# Patient Record
Sex: FEMALE | Race: BLACK OR AFRICAN AMERICAN | Employment: FULL TIME | URBAN - METROPOLITAN AREA
[De-identification: names, ages, dates, MRNs, and addresses within clinical notes are randomized per-mention and may not be internally consistent; named-entity substitution may affect disease eponyms.]

---

## 2017-04-21 ENCOUNTER — OFFICE VISIT (OUTPATIENT)
Dept: NEUROLOGY | Age: 32
End: 2017-04-21

## 2017-04-21 VITALS
HEIGHT: 62 IN | DIASTOLIC BLOOD PRESSURE: 98 MMHG | SYSTOLIC BLOOD PRESSURE: 140 MMHG | RESPIRATION RATE: 16 BRPM | HEART RATE: 87 BPM | BODY MASS INDEX: 34.78 KG/M2 | TEMPERATURE: 98.8 F | WEIGHT: 189 LBS | OXYGEN SATURATION: 99 %

## 2017-04-21 DIAGNOSIS — R07.89 OTHER CHEST PAIN: ICD-10-CM

## 2017-04-21 DIAGNOSIS — G54.6 PHANTOM LIMB PAIN (HCC): ICD-10-CM

## 2017-04-21 DIAGNOSIS — G43.919 INTRACTABLE MIGRAINE, UNSPECIFIED MIGRAINE TYPE: Primary | ICD-10-CM

## 2017-04-21 DIAGNOSIS — G89.4 CHRONIC PAIN SYNDROME: ICD-10-CM

## 2017-04-21 DIAGNOSIS — F11.90 CHRONIC, CONTINUOUS USE OF OPIOIDS: ICD-10-CM

## 2017-04-21 DIAGNOSIS — F41.1 GAD (GENERALIZED ANXIETY DISORDER): ICD-10-CM

## 2017-04-21 DIAGNOSIS — G62.9 POLYNEUROPATHY: ICD-10-CM

## 2017-04-21 RX ORDER — CYCLOBENZAPRINE HCL 5 MG
5 TABLET ORAL
Qty: 90 TAB | Refills: 1 | Status: SHIPPED | OUTPATIENT
Start: 2017-04-21 | End: 2019-11-07 | Stop reason: ALTCHOICE

## 2017-04-21 RX ORDER — OXYCODONE AND ACETAMINOPHEN 10; 325 MG/1; MG/1
1 TABLET ORAL
Qty: 60 TAB | Refills: 0 | Status: SHIPPED | OUTPATIENT
Start: 2017-04-21 | End: 2018-06-04

## 2017-04-21 RX ORDER — OXYCODONE HYDROCHLORIDE 5 MG/1
10 CAPSULE ORAL
Qty: 60 CAP | Refills: 0 | Status: SHIPPED | OUTPATIENT
Start: 2017-04-21 | End: 2018-06-04

## 2017-04-21 RX ORDER — OXYCODONE HYDROCHLORIDE 10 MG/1
10 TABLET ORAL
Qty: 60 TAB | Refills: 0 | Status: SHIPPED | OUTPATIENT
Start: 2017-04-21 | End: 2018-06-04

## 2017-04-21 NOTE — MR AVS SNAPSHOT
Visit Information Date & Time Provider Department Dept. Phone Encounter #  
 4/21/2017  1:00 PM [facility] [phone] [id number] Follow-up Instructions Return in about 2 months (around 6/21/2017). Your Appointments 4/27/2017  8:40 AM  
[facility] Appt Note: follow up, patient told to bring in ins card/photo id [id number]/10/17 [id number] Upcoming Health Maintenance Date Due Pneumococcal 19-64 Medium Risk (1 of 1 - PPSV23) 3/18/2005 DTaP/Tdap/Td series (1 - Tdap) 7/9/2010 INFLUENZA AGE 9 TO ADULT 8/1/2016 PAP AKA CERVICAL CYTOLOGY 9/10/2017 Allergies as of 4/21/2017  Review Complete On: 4/21/2017 By: Arnold Vásquez MD  
  
 Severity Noted Reaction Type Reactions Codeine  12/09/2015    Other (comments) Hallucinations, raise blood pressure Lortab [Hydrocodone-acetaminophen]  10/27/2011    Hives, Itching Tramadol  09/10/2014   Side Effect Nausea Only Headache Current Immunizations  Reviewed on 1/8/2015 Name Date  
 TB Skin Test (PPD) Intradermal 3/24/2014 Td 7/8/2010 Not reviewed this visit You Were Diagnosed With   
  
 Codes Comments Intractable migraine, unspecified migraine type    -  Primary ICD-10-CM: M21.693 ICD-9-CM: 346.91 Polyneuropathy     ICD-10-CM: G62.9 ICD-9-CM: 356.9 Chronic pain syndrome     ICD-10-CM: G89.4 ICD-9-CM: 338.4 STEVEN (generalized anxiety disorder)     ICD-10-CM: F41.1 ICD-9-CM: 300.02 Other chest pain     ICD-10-CM: R07.89 ICD-9-CM: 786.59 Phantom limb pain ([address]     ICD-10-CM: G54.6 ICD-9-CM: 353.6 Vitals BP Pulse Temp Resp Height(growth percentile) Weight(growth percentile) (!) 140/98 (BP 1 Location: Left arm, BP Patient Position: Sitting) 87 98.8 °F (37.1 °C) (Oral) 16 5' 2\" (1.575 m) 189 lb (85.7 kg) LMP SpO2 BMI OB Status Smoking Status 03/17/2017 (Exact Date) 99% 34.57 kg/m2 Having regular periods Current Every Day Smoker BMI and BSA Data Body Mass Index Body Surface Area 34.57 kg/m 2 1.94 m 2 Preferred Pharmacy Pharmacy Name Phone YOGI [email or web address] - [place], [address] [phone] Your Updated Medication List  
  
   
This list is accurate as of: 4/21/17  2:19 PM.  Always use your most recent med list.  
  
  
  
  
 albuterol 90 mcg/actuation inhaler Commonly known as:  PROVENTIL HFA, VENTOLIN HFA, PROAIR HFA Take 1 puff by inhalation every four (4) hours as needed for Wheezing. CLARITIN 10 mg tablet Generic drug:  loratadine Take 10 mg by mouth. * FLEXERIL PO Take  by mouth as needed. * cyclobenzaprine 5 mg tablet Commonly known as:  FLEXERIL Take 1 Tab by mouth three (3) times daily (with meals). hydroCHLOROthiazide 12.5 mg tablet Commonly known as:  HYDRODIURIL Take 12.5 mg by mouth daily. IMITREX 100 mg tablet Generic drug:  SUMAtriptan Take  by mouth once as needed for Migraine. lisinopril 20 mg tablet Commonly known as:  Rexanne  Take 1 Tab by mouth daily. METOPROLOL SUCCINATE PO Take  by mouth daily. montelukast 10 mg tablet Commonly known as:  SINGULAIR  
  
 oxyCODONE 5 mg capsule Commonly known as:  OXYIR Take 2 Caps by mouth every six (6) hours as needed for up to 6 doses. traZODone 100 mg tablet Commonly known as:  DESYREL  
100 mg nightly. TRIAMTERENE-HYDROCHLOROTHIAZID PO Take  by mouth daily. * Notice: This list has 2 medication(s) that are the same as other medications prescribed for you.  Read the directions carefully, and ask your doctor or other care provider to review them with you. Prescriptions Printed Refills  
 oxyCODONE (OXYIR) 5 mg capsule 0 Sig: Take 2 Caps by mouth every six (6) hours as needed for up to 6 doses. Class: Print Route: Oral  
  
Prescriptions Sent to Pharmacy Refills  
 cyclobenzaprine (FLEXERIL) 5 mg tablet 1 Sig: Take 1 Tab by mouth three (3) times daily (with meals). Class: Normal  
 Pharmacy: [email or web address], [address] #: [phone] Route: Oral  
  
Follow-up Instructions Return in about 2 months (around 6/21/2017). Introducing [facility]! [facility] introduces [employer] patient portal. Now you can access parts of your medical record, email your doctor's office, and request medication refills online. 1. In your internet browser, go to [email or web address] 2. Click on the First Time User? Click Here link in the Sign In box. You will see the New Member Sign Up page. 3. Enter your InDMusic Access Code exactly as it appears below. You will not need to use this code after youve completed the sign-up process. If you do not sign up before the expiration date, you must request a new code. · InDM[employer] Access Code: [email or web address] Expires: 7/20/2017  1:37 PM 
 
4. Enter the last four digits of your Social Security Number (xxxx) and Date of Birth (mm/dd/yyyy) as indicated and click Submit. You will be taken to the next sign-up page. 5. Create a AppMesht ID. This will be your In[employer]ic login ID and cannot be changed, so think of one that is secure and easy to remember. 6. Create a In[employer]ic password. You can change your password at any time. 7. Enter your Password Reset Question and Answer. This can be used at a later time if you forget your password. 8. Enter your e-mail address. You will receive e-mail notification when new information is available in [address] 9. Click Sign Up. You can now view and download portions of your medical record. 10. Click the Download Summary menu link to download a portable copy of your medical information. If you have questions, please visit the Frequently Asked Questions section of the [employer]IDEEN website. Remember, [employer]IDEEN is NOT to be used for urgent needs. For medical emergencies, dial 911. Now available from your iPhone and Android! Please provide this summary of care documentation to your next provider. Your primary care clinician is listed as NONE. If you have any questions after today's visit, please call [phone].

## 2017-04-21 NOTE — PROGRESS NOTES
Chief Complaint   Patient presents with    Migraine    Medication Refill     Pain contract signed and urine sample obtained

## 2017-04-21 NOTE — PROGRESS NOTES
Neurology Progress Note    NAME:  Shahzad Mercedes   :   1985   MRN:   [id number]     Date/Time:  2017  Subjective:    zaki Mercedes is a 33 y.o. female here today for follow up. Says she is having chest pain on the left side. Headache has improved some. Occasional dizziness. Pain of the hands sharp in nature,nausea, no vomiting. She noted that chest pain has been intermittent. Review of Systems:  Per HPI - Otherwise 12 point ROS was negative     []Unable to obtain  ROS due to  []mental status change  []sedated   []intubated    Medications reviewed:  Current Outpatient Prescriptions   Medication Sig Dispense Refill    METOPROLOL SUCCINATE PO Take  by mouth daily.  TRIAMTERENE-HYDROCHLOROTHIAZID PO Take  by mouth daily.  CYCLOBENZAPRINE HCL (FLEXERIL PO) Take  by mouth as needed.  oxyCODONE (OXYIR) 5 mg capsule Take 2 Caps by mouth every six (6) hours as needed for up to 6 doses. 60 Cap 0    cyclobenzaprine (FLEXERIL) 5 mg tablet Take 1 Tab by mouth three (3) times daily (with meals). 90 Tab 1    oxyCODONE IR (ROXICODONE) 10 mg tab immediate release tablet Take 1 Tab by mouth every eight (8) hours as needed. Max Daily Amount: 30 mg. 60 Tab 0    oxyCODONE-acetaminophen (PERCOCET 10)  mg per tablet Take 1 Tab by mouth every eight (8) hours as needed for Pain. Max Daily Amount: 3 Tabs. 60 Tab 0    SUMAtriptan (IMITREX) 100 mg tablet Take  by mouth once as needed for Migraine.  loratadine (CLARITIN) 10 mg tablet Take 10 mg by mouth.  traZODone (DESYREL) 100 mg tablet 100 mg nightly.  albuterol (PROVENTIL HFA, VENTOLIN HFA) 90 mcg/actuation inhaler Take 1 puff by inhalation every four (4) hours as needed for Wheezing. 1 Inhaler 3    Hydrochlorothiazide (HYDRODIURIL) 12.5 mg tablet Take 12.5 mg by mouth daily.  montelukast (SINGULAIR) 10 mg tablet       lisinopril (PRINIVIL, ZESTRIL) 20 mg tablet Take 1 Tab by mouth daily.  (Patient taking differently: Take 30 mg by mouth daily.) 90 Tab 3        Objective:   Vitals:  Vitals:    04/21/17 1347   BP: (!) 140/98   Pulse: 87   Resp: 16   Temp: 98.8 °F (37.1 °C)   TempSrc: Oral   SpO2: 99%   Weight: 189 lb (85.7 kg)   Height: 5' 2\" (1.575 m)   PainSc:   8   PainLoc: Neck   LMP: 03/17/2017       PHYSICAL EXAM:  General:    Alert, cooperative, moderate painful distress, appears stated age. Head:   Normocephalic, without obvious abnormality, atraumatic. Eyes:   Conjunctivae/corneas clear. PERRLA  Nose:  Nares normal. No drainage or sinus tenderness. Throat:    Lips, mucosa, and tongue normal.  No Thrush  Neck:  Supple, symmetrical,  no adenopathy, thyroid: non tender    no carotid bruit and no JVD. Back:    Symmetric,  No CVA tenderness. Lungs:   Clear to auscultation bilaterally. No Wheezing or Rhonchi. No rales. Chest wall:  No tenderness or deformity. No Accessory muscle use. Heart:   Regular rate and rhythm,  no murmur, rub or gallop. Abdomen:   Soft, non-tender. Not distended. Bowel sounds normal. No masses  Extremities: Extremities normal, atraumatic, No cyanosis. No edema. No clubbing  Skin:     Texture, turgor normal. No rashes or lesions. Not Jaundiced  Lymph nodes: Cervical, supraclavicular normal.  Psych:  Good insight. Not depressed. Not anxious or agitated. NEUROLOGICAL EXAM:  Appearance: The patient is well developed, well nourished, provides a coherent history and is moderate painful distress. Mental Status: Oriented to time, place and person. Mood and affect appropriate. Cranial Nerves:   Intact visual fields. Fundi are benign. RAYNE, EOM's full, no nystagmus, no ptosis. Facial sensation is normal. Corneal reflexes are intact. Facial movement is symmetric. Hearing is normal bilaterally. Palate is midline with normal sternocleidomastoid and trapezius muscles are normal. Tongue is midline. Motor:  5/5 strength in upper and lower proximal and distal muscles. Normal bulk and tone.  No fasciculations. Reflexes:   Deep tendon reflexes 2+/4 and symmetrical.   Sensory:   Normal to touch, pinprick and vibration. Gait:  Normal gait. Tremor:   Mild tremor noted. Cerebellar:  No cerebellar signs present. Neurovascular:  Normal heart sounds and regular rhythm, peripheral pulses intact, and no carotid bruits. Lab Data Reviewed:    No visits with results within 3 Month(s) from this visit. Latest known visit with results is:    Admission on 12/09/2015, Discharged on 12/09/2015   Component Date Value Ref Range Status    WBC 12/09/2015 6.3  3.6 - 11.0 K/uL Final    RBC 12/09/2015 3.93  3.80 - 5.20 M/uL Final    HGB 12/09/2015 11.5  11.5 - 16.0 g/dL Final    HCT 12/09/2015 34.9* 35.0 - 47.0 % Final    MCV 12/09/2015 88.8  80.0 - 99.0 FL Final    MCH 12/09/2015 29.3  26.0 - 34.0 PG Final    MCHC 12/09/2015 33.0  30.0 - 36.5 g/dL Final    RDW 12/09/2015 15.1* 11.5 - 14.5 % Final    PLATELET 40/60/9159 418  150 - 400 K/uL Final    NEUTROPHILS 12/09/2015 51  32 - 75 % Final    LYMPHOCYTES 12/09/2015 43  12 - 49 % Final    MONOCYTES 12/09/2015 5  5 - 13 % Final    EOSINOPHILS 12/09/2015 1  0 - 7 % Final    BASOPHILS 12/09/2015 0  0 - 1 % Final    ABS. NEUTROPHILS 12/09/2015 3.2  1.8 - 8.0 K/UL Final    ABS. LYMPHOCYTES 12/09/2015 2.7  0.8 - 3.5 K/UL Final    ABS. MONOCYTES 12/09/2015 0.3  0.0 - 1.0 K/UL Final    ABS. EOSINOPHILS 12/09/2015 0.1  0.0 - 0.4 K/UL Final    ABS.  BASOPHILS 12/09/2015 0.0  0.0 - 0.1 K/UL Final    Sodium 12/09/2015 139  136 - 145 mmol/L Final    Potassium 12/09/2015 3.9  3.5 - 5.1 mmol/L Final    Chloride 12/09/2015 105  97 - 108 mmol/L Final    CO2 12/09/2015 26  21 - 32 mmol/L Final    Anion gap 12/09/2015 8  5 - 15 mmol/L Final    Glucose 12/09/2015 82  65 - 100 mg/dL Final    BUN 12/09/2015 9  6 - 20 MG/DL Final    Creatinine 12/09/2015 0.95  0.55 - 1.02 MG/DL Final    BUN/Creatinine ratio 12/09/2015 9* 12 - 20   Final    GFR est AA 12/09/2015 >60  >60 ml/min/1.73m2 Final    GFR est non-AA 12/09/2015 >60  >60 ml/min/1.73m2 Final    Comment: Estimated GFR is calculated using the IDMS-traceable Modification of Diet in Renal Disease (MDRD) Study equation, reported for both  Americans (GFRAA) and non- Americans (GFRNA), and normalized to 1.73m2 body surface area. The physician must decide which value applies to the patient. The MDRD study equation should only be used in individuals age 25 or older. It has not been validated for the following: pregnant women, patients with serious comorbid conditions, or on certain medications, or persons with extremes of body size, muscle mass, or nutritional status.  Calcium 12/09/2015 8.7  8.5 - 10.1 MG/DL Final    Bilirubin, total 12/09/2015 0.2  0.2 - 1.0 MG/DL Final    ALT (SGPT) 12/09/2015 18  12 - 78 U/L Final    AST (SGOT) 12/09/2015 9* 15 - 37 U/L Final    Alk.  phosphatase 12/09/2015 65  45 - 117 U/L Final    Protein, total 12/09/2015 7.4  6.4 - 8.2 g/dL Final    Albumin 12/09/2015 3.9  3.5 - 5.0 g/dL Final    Globulin 12/09/2015 3.5  2.0 - 4.0 g/dL Final    A-G Ratio 12/09/2015 1.1  1.1 - 2.2   Final    Color 12/09/2015 YELLOW/STRAW    Final    Color Reference Range: Straw, Yellow or Dark Yellow    Appearance 12/09/2015 CLOUDY* CLEAR   Final    Specific gravity 12/09/2015 1.025  1.003 - 1.030   Final    pH (UA) 12/09/2015 6.5  5.0 - 8.0   Final    Protein 12/09/2015 NEGATIVE   NEG mg/dL Final    Glucose 12/09/2015 NEGATIVE   NEG mg/dL Final    Ketone 12/09/2015 NEGATIVE   NEG mg/dL Final    Bilirubin 12/09/2015 NEGATIVE   NEG   Final    Blood 12/09/2015 TRACE* NEG   Final    Urobilinogen 12/09/2015 1.0  0.2 - 1.0 EU/dL Final    Nitrites 12/09/2015 NEGATIVE   NEG   Final    Leukocyte Esterase 12/09/2015 NEGATIVE   NEG   Final    WBC 12/09/2015 0-4  0 - 4 /hpf Final    RBC 12/09/2015 0-5  0 - 5 /hpf Final    Epithelial cells 12/09/2015 FEW  FEW /lpf Final Epithelial cell category consists of squamous cells and /or transitional urothelial cells. Renal tubular cells, if present, are separately identified as such.  Bacteria 12/09/2015 NEGATIVE   NEG /hpf Final    UA:UC IF INDICATED 12/09/2015 CULTURE NOT INDICATED BY UA RESULT  CNI   Final    D-dimer 12/09/2015 0.25  0.00 - 0.65 mg/L FEU Final    Comment: (NOTE)  The combination of a low pre-test probability based on Wells criteria  and a D-Dimer result below the cutoff value of 0.5 mg/L increases the   negative predictive value for DVT to %.  Pregnancy test,urine (POC) 12/09/2015 NEGATIVE   NEG   Final       CT Results (recent): MRI Results (recent):    Results from [place] encounter on 10/26/16   MRI BRAIN W WO CONT   Narrative CLINICAL HISTORY: Possible demyelinating disease,    INDICATION: Demyelinating disease ([address] COMPARISON: None    TECHNIQUE: MR examination of the brain includes axial and sagittal T1  pre-and-post contrast, axial T2, axial FLAIR, axial gradient echo, axial DWI,  coronal T1 postcontrast.    Contrast: The patient was administered gadolinium-based contrast material axial  and coronal T1-weighted postcontrast enhanced imaging was obtained. FINDINGS:     There is no intracranial mass, hemorrhage or evidence of acute infarction. There is no Chiari or syrinx. The pituitary and infundibulum are grossly  unremarkable. There is no skull base mass. Cerebellopontine angles are grossly  unremarkable. The major intracranial vascular flow-voids are unremarkable. No  evidence of demyelinating process. There is no abnormal parenchymal enhancement. There is no abnormal meningeal  enhancement. The cavernous sinuses are symmetric. Optic chiasm and infundibulum  grossly unremarkable. Orbits are grossly symmetric. Dural venous sinuses are  grossly patent. The brain architecture is normal. There is no evidence of midline shift or  mass-effect.  The ventricles are normal in size, position and configuration. No  focal areas of abnormal signal intensity are seen. There are no extra-axial  fluid collections. The mastoid air cells and paranasal sinuses are well pneumatized and clear. Impression IMPRESSION:     Normal MRI of the brain. There is no intracranial mass, hemorrhage, acute infarction or evidence of  demyelinating process. IR Results (recent):  No results found for this or any previous visit. VAS/US Results (recent):  No results found for this or any previous visit. Assesment  Patient Active Problem List   Diagnosis Code    HTN (hypertension) I10    Anxiety and depression F41.9, F32.9    Dyslipidemia E78.5    Asthma J45.909      ___________________________________________________  PLAN:Patient asked to see PCP or go ER for chest pain      ICD-10-CM ICD-9-CM    1. Intractable migraine, unspecified migraine type G43.919 346.91 oxyCODONE (OXYIR) 5 mg capsule      cyclobenzaprine (FLEXERIL) 5 mg tablet   2. Polyneuropathy G62.9 356.9    3. Chronic pain syndrome G89.4 338.4    4. STEVEN (generalized anxiety disorder) F41.1 300.02    5. Other chest pain R07.89 786.59    6. Phantom limb pain (HCC) G54.6 353.6    7. Chronic, continuous use of opioids F11.90 305.51 COMPLIANCE DRUG SCREEN/PRESCRIPTION MONITORING     Follow-up Disposition:  Return in about 2 months (around 6/21/2017).            ___________________________________________________    Total time spent with patient:  []15   []25   []35   [] __ minutes    Care Plan discussed with:    []Patient   []Family    []Care Manager   []Consultant/Specialist :    ___________________________________________________    Attending Physician: Lilliana Lewis MD

## 2017-04-28 LAB — DRUGS UR: NORMAL

## 2017-06-21 ENCOUNTER — TELEPHONE (OUTPATIENT)
Dept: NEUROLOGY | Age: 32
End: 2017-06-21

## 2017-06-21 NOTE — TELEPHONE ENCOUNTER
Patient called because she got a reminder call about an appointment for tomorrow. I told that was a mistake and it should have been canceled. Patient states she never received a letter in the mail about being discharged and she still does not know \"how\" it happened as far as the reason why.

## 2018-06-04 ENCOUNTER — HOSPITAL ENCOUNTER (EMERGENCY)
Age: 33
Discharge: HOME | End: 2018-06-05
Attending: EMERGENCY MEDICINE | Admitting: EMERGENCY MEDICINE
Payer: MEDICAID

## 2018-06-04 VITALS
DIASTOLIC BLOOD PRESSURE: 97 MMHG | TEMPERATURE: 97.8 F | BODY MASS INDEX: 32.94 KG/M2 | WEIGHT: 179 LBS | SYSTOLIC BLOOD PRESSURE: 146 MMHG | HEART RATE: 84 BPM | OXYGEN SATURATION: 100 % | RESPIRATION RATE: 18 BRPM | HEIGHT: 62 IN

## 2018-06-04 DIAGNOSIS — N39.0 URINARY TRACT INFECTION WITHOUT HEMATURIA, SITE UNSPECIFIED: Primary | ICD-10-CM

## 2018-06-04 LAB
APPEARANCE UR: ABNORMAL
BACTERIA URNS QL MICRO: ABNORMAL /HPF
BILIRUB UR QL CFM: NEGATIVE
COLOR UR: ABNORMAL
EPITH CASTS URNS QL MICRO: ABNORMAL /LPF
GLUCOSE UR STRIP.AUTO-MCNC: NEGATIVE MG/DL
HGB UR QL STRIP: NEGATIVE
KETONES UR QL STRIP.AUTO: ABNORMAL MG/DL
LEUKOCYTE ESTERASE UR QL STRIP.AUTO: NEGATIVE
NITRITE UR QL STRIP.AUTO: NEGATIVE
PH UR STRIP: 6 [PH] (ref 5–8)
PROT UR STRIP-MCNC: NEGATIVE MG/DL
RBC #/AREA URNS HPF: ABNORMAL /HPF (ref 0–5)
SP GR UR REFRACTOMETRY: 1.02 (ref 1–1.03)
UA: UC IF INDICATED,UAUC: ABNORMAL
UROBILINOGEN UR QL STRIP.AUTO: 0.2 EU/DL (ref 0.2–1)
WBC URNS QL MICRO: ABNORMAL /HPF (ref 0–4)

## 2018-06-04 PROCEDURE — 87147 CULTURE TYPE IMMUNOLOGIC: CPT | Performed by: EMERGENCY MEDICINE

## 2018-06-04 PROCEDURE — 81001 URINALYSIS AUTO W/SCOPE: CPT | Performed by: EMERGENCY MEDICINE

## 2018-06-04 PROCEDURE — 99283 EMERGENCY DEPT VISIT LOW MDM: CPT

## 2018-06-04 PROCEDURE — 87086 URINE CULTURE/COLONY COUNT: CPT | Performed by: EMERGENCY MEDICINE

## 2018-06-05 LAB — HCG UR QL: NEGATIVE

## 2018-06-05 PROCEDURE — 81025 URINE PREGNANCY TEST: CPT

## 2018-06-05 RX ORDER — CEPHALEXIN 500 MG/1
500 CAPSULE ORAL 2 TIMES DAILY
Qty: 14 CAP | Refills: 0 | Status: SHIPPED | OUTPATIENT
Start: 2018-06-05 | End: 2018-06-12

## 2018-06-05 NOTE — ED PROVIDER NOTES
EMERGENCY DEPARTMENT HISTORY AND PHYSICAL EXAM      Date: 6/4/2018  Patient Name: Seema Dao    History of Presenting Illness     Chief Complaint   Patient presents with    Urinary Frequency     patient reports to ed with complaints of urinary frequency/pressure x 5 days       History Provided By: Patient    HPI: Seema Dao, 29 y.o. female with PMHx significant for HTN, arthritis, PTSD, bipolar, anxiety, depression, presents ambulatory to the ED with cc of intermittent urinary frequency and pressure x 5 days. She notes that she will begin to urinate and then it will \"stop for 20-30 seconds and start again. \" Pt also c/o mild abdominal cramping. She reports that her LMP was last week. Pt notes a hx of HTN for which she is prescribed metoprolol, but pt states that \"it doesn't work\". She reports taking \"1/2 of my cousin's clonidine\" today instead of her prescribed metoprolol. Pt denies any abnormal vaginal discharge. She denies any OTC medications or any other modifying factors for her sxs. Pt specifically denies any fevers, chills, SOB, HA, CP, N/V/D, dysuria, hematuria, or urgency. There are no other complaints, changes, or physical findings at this time. PCP: None     Social Hx: + EtOH; + Smoker (0.5 ppd); + Illicit Drugs (marijuana)      Current Outpatient Prescriptions   Medication Sig Dispense Refill    cephALEXin (KEFLEX) 500 mg capsule Take 1 Cap by mouth two (2) times a day for 7 days. 14 Cap 0    METOPROLOL SUCCINATE PO Take  by mouth daily.  TRIAMTERENE-HYDROCHLOROTHIAZID PO Take  by mouth daily.  CYCLOBENZAPRINE HCL (FLEXERIL PO) Take  by mouth as needed.  cyclobenzaprine (FLEXERIL) 5 mg tablet Take 1 Tab by mouth three (3) times daily (with meals). 90 Tab 1    SUMAtriptan (IMITREX) 100 mg tablet Take  by mouth once as needed for Migraine.  loratadine (CLARITIN) 10 mg tablet Take 10 mg by mouth.       Hydrochlorothiazide (HYDRODIURIL) 12.5 mg tablet Take 12.5 mg by mouth daily.      montelukast (SINGULAIR) 10 mg tablet       traZODone (DESYREL) 100 mg tablet 100 mg nightly.  albuterol (PROVENTIL HFA, VENTOLIN HFA) 90 mcg/actuation inhaler Take 1 puff by inhalation every four (4) hours as needed for Wheezing. 1 Inhaler 3    lisinopril (PRINIVIL, ZESTRIL) 20 mg tablet Take 1 Tab by mouth daily. (Patient taking differently: Take 30 mg by mouth daily.) 90 Tab 3       Past History     Past Medical History:  Past Medical History:   Diagnosis Date    Anxiety disorder     Arthritis     Asthma     Bipolar 1 disorder ([facility] [address]     Depression     Frequent headaches     Frequent headaches     Hypertension     Migraine     Muscle pain     Other ill-defined conditions(799.89)     dysplagia    Psychiatric disorder     bipolar, anxiety, depression, PTSD    PTSD (post-traumatic stress disorder)        Past Surgical History:  Past Surgical History:   Procedure Laterality Date    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    HX GYN  3-    birth    HX OTHER SURGICAL      Dysplasia       Family History:  Family History   Problem Relation Age of Onset   Mely Antoine Cancer Mother     Diabetes Father     Other Father      Aneursym   Mely Antoine Migraines Sister        Social History:  Social History   Substance Use Topics    Smoking status: Current Every Day Smoker     Packs/day: 0.50     Types: Cigarettes    Smokeless tobacco: Never Used    Alcohol use 0.0 oz/week      Comment: social       Allergies: Allergies   Allergen Reactions    Codeine Other (comments)     Hallucinations, raise blood pressure     Lortab [Hydrocodone-Acetaminophen] Hives and Itching    Tramadol Nausea Only     Headache         Review of Systems   Review of Systems   Constitutional: Negative for chills and fever. HENT: Negative for congestion, rhinorrhea, sneezing and sore throat. Eyes: Negative for redness and visual disturbance. Respiratory: Negative for shortness of breath.     Cardiovascular: Negative for leg swelling. Gastrointestinal: Negative for abdominal pain, nausea and vomiting. Genitourinary: Positive for difficulty urinating and frequency. Negative for dysuria, hematuria, urgency and vaginal discharge. Musculoskeletal: Negative for back pain, myalgias and neck stiffness. Skin: Negative for rash. Neurological: Negative for dizziness, syncope, weakness and headaches. Hematological: Negative for adenopathy. All other systems reviewed and are negative. Physical Exam   Physical Exam   Constitutional: She is oriented to person, place, and time. She appears well-developed and well-nourished. Smells of marijuana   HENT:   Head: Normocephalic and atraumatic. Mouth/Throat: Oropharynx is clear and moist.   Eyes: Conjunctivae and EOM are normal.   Neck: Normal range of motion and full passive range of motion without pain. Neck supple. Cardiovascular: Normal rate, regular rhythm, S1 normal, S2 normal, normal heart sounds, intact distal pulses and normal pulses. No murmur heard. Pulmonary/Chest: Effort normal and breath sounds normal. No respiratory distress. She has no wheezes. Abdominal: Soft. Normal appearance and bowel sounds are normal. She exhibits no distension. There is no tenderness. There is no rebound. Musculoskeletal: Normal range of motion. Neurological: She is alert and oriented to person, place, and time. She has normal strength. Skin: Skin is warm, dry and intact. No rash noted. Psychiatric: She has a normal mood and affect. Her speech is normal and behavior is normal. Judgment and thought content normal.   Nursing note and vitals reviewed.       Diagnostic Study Results     Labs -     Recent Results (from the past 12 hour(s))   URINALYSIS W/ REFLEX CULTURE    Collection Time: 06/04/18 11:31 PM   Result Value Ref Range    Color YELLOW/STRAW      Appearance TURBID (A) CLEAR      Specific gravity 1.025 1.003 - 1.030      pH (UA) 6.0 5.0 - 8.0      Protein NEGATIVE  NEG mg/dL    Glucose NEGATIVE  NEG mg/dL    Ketone TRACE (A) NEG mg/dL    Blood NEGATIVE  NEG      Urobilinogen 0.2 0.2 - 1.0 EU/dL    Nitrites NEGATIVE  NEG      Leukocyte Esterase NEGATIVE  NEG      WBC 0-4 0 - 4 /hpf    RBC 0-5 0 - 5 /hpf    Epithelial cells MODERATE (A) FEW /lpf    Bacteria 1+ (A) NEG /hpf    UA:UC IF INDICATED URINE CULTURE ORDERED (A) CNI     BILIRUBIN, CONFIRM    Collection Time: 06/04/18 11:31 PM   Result Value Ref Range    Bilirubin UA, confirm NEGATIVE  NEG     HCG URINE, QL. - POC    Collection Time: 06/05/18 12:21 AM   Result Value Ref Range    Pregnancy test,urine (POC) NEGATIVE  NEG       Medical Decision Making   I am the first provider for this patient. I reviewed the vital signs, available nursing notes, past medical history, past surgical history, family history and social history. Vital Signs-Reviewed the patient's vital signs. Patient Vitals for the past 12 hrs:   Temp Pulse Resp BP SpO2   06/04/18 2325 97.8 °F (36.6 °C) 84 18 (!) 146/97 100 %     Records Reviewed: Nursing Notes and Old Medical Records    Provider Notes (Medical Decision Making):   DDx: UTI, pregnancy, pyelonephritis     ED Course:   Initial assessment performed. The patients presenting problems have been discussed, and they are in agreement with the care plan formulated and outlined with them. I have encouraged them to ask questions as they arise throughout their visit. PROGRESS NOTE:   11:50 PM  Lauren Richardson PA-C educated pt on not taking medication that is not prescribed. Follow up with PCP for HTN. Critical Care Time: 0 minutes    Disposition:  Discharge Note:  12:02 AM  The pt is ready for discharge. The pt's signs, symptoms, diagnosis, and discharge instructions have been discussed and pt has conveyed their understanding. The pt is to follow up as recommended or return to ER should their symptoms worsen. Plan has been discussed and pt is in agreement. PLAN:  1.    Discharge Medication List as of 6/5/2018 12:31 AM      START taking these medications    Details   cephALEXin (KEFLEX) 500 mg capsule Take 1 Cap by mouth two (2) times a day for 7 days. , Print, Disp-14 Cap, R-0         CONTINUE these medications which have NOT CHANGED    Details   METOPROLOL SUCCINATE PO Take  by mouth daily. , Historical Med      TRIAMTERENE-HYDROCHLOROTHIAZID PO Take  by mouth daily. , Historical Med      !! CYCLOBENZAPRINE HCL (FLEXERIL PO) Take  by mouth as needed., Historical Med      !! cyclobenzaprine (FLEXERIL) 5 mg tablet Take 1 Tab by mouth three (3) times daily (with meals). , Normal, Disp-90 Tab, R-1      SUMAtriptan (IMITREX) 100 mg tablet Take  by mouth once as needed for Migraine., Historical Med      loratadine (CLARITIN) 10 mg tablet Take 10 mg by mouth., Historical Med      Hydrochlorothiazide (HYDRODIURIL) 12.5 mg tablet Take 12.5 mg by mouth daily. , Historical Med      montelukast (SINGULAIR) 10 mg tablet Historical Med      traZODone (DESYREL) 100 mg tablet 100 mg nightly., Historical Med      albuterol (PROVENTIL HFA, VENTOLIN HFA) 90 mcg/actuation inhaler Take 1 puff by inhalation every four (4) hours as needed for Wheezing., Normal, Disp-1 Inhaler, R-3      lisinopril (PRINIVIL, ZESTRIL) 20 mg tablet Take 1 Tab by mouth daily. , Normal, Disp-90 Tab, R-3       !! - Potential duplicate medications found. Please discuss with provider. 2.   Follow-up Information     Follow up With Details Comments [address] [facility],[address] [facility] Schedule an appointment as soon as possible for a visit in 1 week As needed, If symptoms worsen [address] [facility] [address] [address] [facility]  [phone]        Return to ED if worse     Diagnosis     Clinical Impression:   1. Urinary tract infection without hematuria, site unspecified        Attestations: This note is prepared by Thai Vila. Fuentes Martini, acting as Scribe for Lauren Richardson PA-C.     Lauren Richardson PA-C: The scribe's documentation has been prepared under my direction and personally reviewed by me in its entirety. I confirm that the note above accurately reflects all work, treatment, procedures, and medical decision making performed by me.

## 2018-06-05 NOTE — DISCHARGE INSTRUCTIONS

## 2018-06-06 LAB
BACTERIA SPEC CULT: ABNORMAL
CC UR VC: ABNORMAL
SERVICE CMNT-IMP: ABNORMAL

## 2018-07-17 ENCOUNTER — HOSPITAL ENCOUNTER (EMERGENCY)
Age: 33
Discharge: HOME | End: 2018-07-17
Attending: EMERGENCY MEDICINE
Payer: MEDICAID

## 2018-07-17 VITALS
WEIGHT: 175 LBS | RESPIRATION RATE: 16 BRPM | OXYGEN SATURATION: 97 % | SYSTOLIC BLOOD PRESSURE: 134 MMHG | DIASTOLIC BLOOD PRESSURE: 89 MMHG | BODY MASS INDEX: 32.2 KG/M2 | HEART RATE: 78 BPM | TEMPERATURE: 98.2 F | HEIGHT: 62 IN

## 2018-07-17 DIAGNOSIS — K52.9 GASTROENTERITIS, ACUTE: Primary | ICD-10-CM

## 2018-07-17 PROCEDURE — 74011250636 HC RX REV CODE- 250/636: Performed by: EMERGENCY MEDICINE

## 2018-07-17 PROCEDURE — 96372 THER/PROPH/DIAG INJ SC/IM: CPT

## 2018-07-17 PROCEDURE — 74011250637 HC RX REV CODE- 250/637: Performed by: EMERGENCY MEDICINE

## 2018-07-17 PROCEDURE — 99283 EMERGENCY DEPT VISIT LOW MDM: CPT

## 2018-07-17 RX ORDER — LOPERAMIDE HYDROCHLORIDE 2 MG/1
2 CAPSULE ORAL
Status: COMPLETED | OUTPATIENT
Start: 2018-07-17 | End: 2018-07-17

## 2018-07-17 RX ORDER — PROMETHAZINE HYDROCHLORIDE 25 MG/ML
25 INJECTION, SOLUTION INTRAMUSCULAR; INTRAVENOUS
Status: COMPLETED | OUTPATIENT
Start: 2018-07-17 | End: 2018-07-17

## 2018-07-17 RX ORDER — ONDANSETRON 4 MG/1
8 TABLET, ORALLY DISINTEGRATING ORAL
Status: COMPLETED | OUTPATIENT
Start: 2018-07-17 | End: 2018-07-17

## 2018-07-17 RX ORDER — LOPERAMIDE HCL 2 MG
2 TABLET ORAL
Qty: 16 TAB | Refills: 0 | Status: SHIPPED | OUTPATIENT
Start: 2018-07-17 | End: 2018-07-27

## 2018-07-17 RX ORDER — ONDANSETRON 4 MG/1
4 TABLET, ORALLY DISINTEGRATING ORAL
Qty: 10 TAB | Refills: 0 | Status: SHIPPED | OUTPATIENT
Start: 2018-07-17 | End: 2019-11-07 | Stop reason: ALTCHOICE

## 2018-07-17 RX ADMIN — PROMETHAZINE HYDROCHLORIDE 25 MG: 25 INJECTION INTRAMUSCULAR; INTRAVENOUS at 22:32

## 2018-07-17 RX ADMIN — ONDANSETRON 8 MG: 4 TABLET, ORALLY DISINTEGRATING ORAL at 22:27

## 2018-07-17 RX ADMIN — LOPERAMIDE HYDROCHLORIDE 2 MG: 2 CAPSULE ORAL at 22:27

## 2018-07-17 NOTE — LETTER
[facility] EMERGENCY DEPT 
[address] [address] [id number] [phone] Work/School Note Date: 7/17/2018 To Whom It May concern: 
 
Cleopatra Santoro was seen and treated today in the emergency room by the following provider(s): 
Attending Provider: Kelvin Plasencia MD.   
 
Cleopatra Santoro may return to work on 7/19/18. Sincerely, Kelvin Plasencia MD

## 2018-07-18 NOTE — ED NOTES
Pt arrived to ED via ambulatory with c/o vomiting since last night. Pt. States she has not vomited in 5 hours now. Pt. Now c/o headache. Pt is alert and orientated X 4; skin is intact; lungs are clear; pt breathes well on room air; Pt is in no acute distress. Will continue to monitor. See nursing assessment. Safety precautions in place; call light within reach. Emergency Department Nursing Plan of Care       The Nursing Plan of Care is developed from the Nursing assessment and Emergency Department Attending provider initial evaluation. The plan of care may be reviewed in the ED Provider note.     The Plan of Care was developed with the following considerations:   Patient / Family readiness to learn indicated by:verbalized understanding  Persons(s) to be included in education: patient  Barriers to Learning/Limitations:No    Signed     Tarun Alejo RN    7/17/2018   10:03 PM

## 2018-07-18 NOTE — DISCHARGE INSTRUCTIONS
[facility] SYSTEMS Departments     For adult and child immunizations, family planning, TB screening, STD testing and women's health services. [facility]: Tow [phone]      [facility] 25   [address]   [address]   170 [facility]: [place] [address]  [phone]      [address]          [address] [place] [address]     For primary care services, woman and child wellness, and some clinics providing specialty care. [facility] -- 1011 [facility]. [facility]5 [facility] [phone]   411 [facility] 200 [facility] 3614 [facility] [phone]   339 [facility] [address] [phone] [address]  [employer] [address] [place] [address] 5850  Community  [phone]   [address]00 [address] [place] [phone]   [facility] 81 [facility] [phone]   [facility] [address] [place] [address] [phone]   [facility]: [facility] 700 [address], [address] [facility] [phone]     [place] [address] [facility] [address] [phone]   [facility] 5850  Community  [phone]   [employer]  [address], [address] ([email or web address]) [phone]         Sexual Health/Woman Wellness Clinics    For STD/HIV testing and treatment, pregnancy testing and services, men's health, birth control services, LGBT services, and hepatitis/HPV vaccine services. [place] [address] Eug[place] for [place] [employer] 201 N. [facility] [address] [facility] [address] [facility] 1579 600 CHARISSE Gutierrez [phone]   [facility] [address], 5th floor [phone]   Pregnancy [address] [facility] [address] [phone]         Specialty Service [address]    [phone]   [place]   [phone]   Women, Infant and Children's Services: [address] [phone]       600 [facility]   [phone]   [address]   [facility]     [phone]   [address] Crisis   [address] [facility] [address] [phone]     Local Primary Care Physicians  [facility] [phone]  MD Manish Little MD Delta Boys, MD [facility] Doctors [phone]  Nuha Villagran, [facility]  MD Michelle Reich MD Carlette Lien, MD [address]  [phone]  Eloy [place]unch[place] [address] [facility] [phone]  MD Kateryna Perez MD Thedford Fetters, MD Estill Marks, MD   [facility] [phone]  [facility] XANDER DACOSTA, MD Benita [facility], NP [address] [phone]  MD Miller Correa MD Cara Meissner, MD Darlene Snowman, MD Alejandra Levering, MD Myles Salaam, MD Berkley Hippo, MD   33 10 [facility]  Nasreen Diane MD [address] [facility] [address] [phone]  MD Lea Penn, NP  MD Simon Sanchez MD Arneta Jacob, MD Graydon Living, MD Melvenia Shears, MD   8049 [facility] [phone]  MD Cynthia Trevizo, P  Amparo Burnette, NP  MD Jose Proctor MD Florencia Rafter, MD Joesph Brunswick, MD [facility] [phone]  MD Dulce Hamilton MD Leva Rand, MD Rodgers Medley, MD Piedad Dauer, MD   [address] [phone]  MD Natalie Ruff MD [facility] [phone]  MD Jessenia Clark MD Sharalyn Coast Shawanda Yi, [address] [facility] [phone]  MD Laine Bazan MD Raydell Gavel, MD Maree Para, MD Josefine Ray, MD Bill Kaiser, BHARATH Garcia MD 1619 [facility]   [phone]  MD Luis Chiang MD Rexford Serve, MD   2102 [facility] [phone]  [address] [place] [address] MD Charli Kang, VIJAY Emery, REJI Emery, VIJAY Khan, MD Brandon Escobar, BHARATH Lynn, DO Miscellaneous:  Blanca Mccray -[phone]            Diarrhea: Care Instructions  Your Care Instructions    Diarrhea is loose, watery stools (bowel movements). The exact cause is often hard to find. Sometimes diarrhea is your body's way of getting rid of what caused an upset stomach. Viruses, food poisoning, and many medicines can cause diarrhea. Some people get diarrhea in response to emotional stress, anxiety, or certain foods. Almost everyone has diarrhea now and then. It usually isn't serious, and your stools will return to normal soon. The important thing to do is replace the fluids you have lost, so you can prevent dehydration. The doctor has checked you carefully, but problems can develop later. If you notice any problems or new symptoms, get medical treatment right away. Follow-up care is a key part of your treatment and safety. Be sure to make and go to all appointments, and call your doctor if you are having problems. It's also a good idea to know your test results and keep a list of the medicines you take. How can you care for yourself at home? · Watch for signs of dehydration, which means your body has lost too much water. Dehydration is a serious condition and should be treated right away. Signs of dehydration are:  ¨ Increasing thirst and dry eyes and mouth. ¨ Feeling faint or lightheaded.   ¨ Darker urine, and a smaller amount of urine than normal.  · To prevent dehydration, drink plenty of fluids, enough so that your urine is light yellow or clear like water. Choose water and other caffeine-free clear liquids until you feel better. If you have kidney, heart, or liver disease and have to limit fluids, talk with your doctor before you increase the amount of fluids you drink. · Begin eating small amounts of mild foods the next day, if you feel like it. ¨ Try yogurt that has live cultures of Lactobacillus. (Check the label.)  ¨ Avoid spicy foods, fruits, alcohol, and caffeine until 48 hours after all symptoms are gone. ¨ Avoid chewing gum that contains sorbitol. ¨ Avoid dairy products (except for yogurt with Lactobacillus) while you have diarrhea and for 3 days after symptoms are gone. · The doctor may recommend that you take over-the-counter medicine, such as loperamide (Imodium), if you still have diarrhea after 6 hours. Read and follow all instructions on the label. Do not use this medicine if you have bloody diarrhea, a high fever, or other signs of serious illness. Call your doctor if you think you are having a problem with your medicine. When should you call for help? Call 911 anytime you think you may need emergency care. For example, call if:    · You passed out (lost consciousness).     · Your stools are maroon or very bloody.    Call your doctor now or seek immediate medical care if:    · You are dizzy or lightheaded, or you feel like you may faint.     · Your stools are black and look like tar, or they have streaks of blood.     · You have new or worse belly pain.     · You have symptoms of dehydration, such as:  ¨ Dry eyes and a dry mouth. ¨ Passing only a little dark urine.   ¨ Feeling thirstier than usual.     · You have a new or higher fever.    Watch closely for changes in your health, and be sure to contact your doctor if:    · Your diarrhea is getting worse.     · You see pus in the diarrhea.     · You are not getting better after 2 days (48 hours). Where can you learn more? Go to http://stephen-jose.info/. Enter L097 in the search box to learn more about \"Diarrhea: Care Instructions. \"  Current as of: November 20, 2017  Content Version: 11.7  © 9491-0679 [employer]. Care instructions adapted under license by [employer] (which disclaims liability or warranty for this information). If you have questions about a medical condition or this instruction, always ask your healthcare professional. [place] [address] any warranty or liability for your use of this information. Nausea and Vomiting: Care Instructions  Your Care Instructions    When you are nauseated, you may feel weak and sweaty and notice a lot of saliva in your mouth. Nausea often leads to vomiting. Most of the time you do not need to worry about nausea and vomiting, but they can be signs of other illnesses. Two common causes of nausea and vomiting are stomach flu and food poisoning. Nausea and vomiting from viral stomach flu will usually start to improve within 24 hours. Nausea and vomiting from food poisoning may last from 12 to 48 hours. The doctor has checked you carefully, but problems can develop later. If you notice any problems or new symptoms, get medical treatment right away. Follow-up care is a key part of your treatment and safety. Be sure to make and go to all appointments, and call your doctor if you are having problems. It's also a good idea to know your test results and keep a list of the medicines you take. How can you care for yourself at home? · To prevent dehydration, drink plenty of fluids, enough so that your urine is light yellow or clear like water. Choose water and other caffeine-free clear liquids until you feel better. If you have kidney, heart, or liver disease and have to limit fluids, talk with your doctor before you increase the amount of fluids you drink.   · Rest in bed until you feel better. · When you are able to eat, try clear soups, mild foods, and liquids until all symptoms are gone for 12 to 48 hours. Other good choices include dry toast, crackers, cooked cereal, and gelatin dessert, such as Jell-O. When should you call for help? Call 911 anytime you think you may need emergency care. For example, call if:    · You passed out (lost consciousness).    Call your doctor now or seek immediate medical care if:    · You have symptoms of dehydration, such as:  ¨ Dry eyes and a dry mouth. ¨ Passing only a little dark urine. ¨ Feeling thirstier than usual.     · You have new or worsening belly pain.     · You have a new or higher fever.     · You vomit blood or what looks like coffee grounds.    Watch closely for changes in your health, and be sure to contact your doctor if:    · You have ongoing nausea and vomiting.     · Your vomiting is getting worse.     · Your vomiting lasts longer than 2 days.     · You are not getting better as expected. Where can you learn more? Go to http://stephen-jose.info/. Enter 25 [id number] in the search box to learn more about \"Nausea and Vomiting: Care Instructions. \"  Current as of: November 20, 2017  Content Version: 11.7  © 6486-0380 [employer]. Care instructions adapted under license by [employer] (which disclaims liability or warranty for this information). If you have questions about a medical condition or this instruction, always ask your healthcare professional. [place] [address] any warranty or liability for your use of this information. Gastroenteritis: Care Instructions  Your Care Instructions    Gastroenteritis is an illness that may cause nausea, vomiting, and diarrhea. It is sometimes called \"stomach flu. \" It can be caused by bacteria or a virus. You will probably begin to feel better in 1 to 2 days.  In the meantime, get plenty of rest and make sure you do not become dehydrated. Dehydration occurs when your body loses too much fluid. Follow-up care is a key part of your treatment and safety. Be sure to make and go to all appointments, and call your doctor if you are having problems. It's also a good idea to know your test results and keep a list of the medicines you take. How can you care for yourself at home? · If your doctor prescribed antibiotics, take them as directed. Do not stop taking them just because you feel better. You need to take the full course of antibiotics. · Drink plenty of fluids to prevent dehydration, enough so that your urine is light yellow or clear like water. Choose water and other caffeine-free clear liquids until you feel better. If you have kidney, heart, or liver disease and have to limit fluids, talk with your doctor before you increase your fluid intake. · Drink fluids slowly, in frequent, small amounts, because drinking too much too fast can cause vomiting. · Begin eating mild foods, such as dry toast, yogurt, applesauce, bananas, and rice. Avoid spicy, hot, or high-fat foods, and do not drink alcohol or caffeine for a day or two. Do not drink milk or eat ice cream until you are feeling better. How to prevent gastroenteritis  · Keep hot foods hot and cold foods cold. · Do not eat meats, dressings, salads, or other foods that have been kept at room temperature for more than 2 hours. · Use a thermometer to check your refrigerator. It should be between 34°F and 40°F.  · Defrost meats in the refrigerator or microwave, not on the kitchen counter. · Keep your hands and your kitchen clean. Wash your hands, cutting boards, and countertops with hot soapy water frequently. · Cook meat until it is well done. · Do not eat raw eggs or uncooked sauces made with raw eggs. · Do not take chances. If food looks or tastes spoiled, throw it out. When should you call for help? Call 911 anytime you think you may need emergency care.  For example, call if:    · You vomit blood or what looks like coffee grounds.     · You passed out (lost consciousness).     · You pass maroon or very bloody stools.    Call your doctor now or seek immediate medical care if:    · You have severe belly pain.     · You have signs of needing more fluids. You have sunken eyes, a dry mouth, and pass only a little dark urine.     · You feel like you are going to faint.     · You have increased belly pain that does not go away in 1 to 2 days.     · You have new or increased nausea, or you are vomiting.     · You have a new or higher fever.     · Your stools are black and tarlike or have streaks of blood.    Watch closely for changes in your health, and be sure to contact your doctor if:    · You are dizzy or lightheaded.     · You urinate less than usual, or your urine is dark yellow or brown.     · You do not feel better with each day that goes by. Where can you learn more? Go to http://stephen-jose.info/. Enter N142 in the search box to learn more about \"Gastroenteritis: Care Instructions. \"  Current as of: November 18, 2017  Content Version: 11.7  © 6614-5194 [employer]. Care instructions adapted under license by [employer] (which disclaims liability or warranty for this information). If you have questions about a medical condition or this instruction, always ask your healthcare professional. [address] any warranty or liability for your use of this information.

## 2019-06-13 ENCOUNTER — HOSPITAL ENCOUNTER (EMERGENCY)
Age: 34
Discharge: HOME | End: 2019-06-14
Attending: EMERGENCY MEDICINE
Payer: COMMERCIAL

## 2019-06-13 VITALS
DIASTOLIC BLOOD PRESSURE: 83 MMHG | RESPIRATION RATE: 18 BRPM | BODY MASS INDEX: 33.49 KG/M2 | HEART RATE: 84 BPM | HEIGHT: 63 IN | SYSTOLIC BLOOD PRESSURE: 123 MMHG | OXYGEN SATURATION: 99 % | WEIGHT: 189 LBS | TEMPERATURE: 97.9 F

## 2019-06-13 DIAGNOSIS — M67.40 GANGLION CYST: Primary | ICD-10-CM

## 2019-06-13 PROCEDURE — 99283 EMERGENCY DEPT VISIT LOW MDM: CPT

## 2019-06-13 PROCEDURE — 74011250637 HC RX REV CODE- 250/637: Performed by: NURSE PRACTITIONER

## 2019-06-13 RX ORDER — IBUPROFEN 600 MG/1
600 TABLET ORAL
Status: COMPLETED | OUTPATIENT
Start: 2019-06-13 | End: 2019-06-13

## 2019-06-13 RX ORDER — IBUPROFEN 600 MG/1
600 TABLET ORAL
Qty: 20 TAB | Refills: 0 | Status: SHIPPED | OUTPATIENT
Start: 2019-06-13 | End: 2019-11-07 | Stop reason: ALTCHOICE

## 2019-06-13 RX ADMIN — IBUPROFEN 600 MG: 600 TABLET ORAL at 23:32

## 2019-06-13 NOTE — LETTER
[facility] EMERGENCY DEPT 
[address] [address] 
[phone] Work/School Note Date: 6/13/2019 To Whom It May concern: 
 
Edmar Miranda was seen and treated today in the emergency room by the following provider(s): 
Attending Provider: Kwabena Chapman MD 
Nurse Practitioner: Basil Acevedo NP. Edmar Miranda may return to work on 6/15/19. Sincerely, Donald Barragan NP

## 2019-06-14 NOTE — ED NOTES
Pt arrived to ED  with c/o cyst on right hand which began 1 day ago patient reports sharp pain 8/10 with movement. Pt took a ibuprofen 3 hours ago with no alleviation. Pt also applied ice last night with rodríguez alleviation. Pt has postive radial pulse with positive motor sensory in both habds. Pt is in no acute distress. Will continue to monitor. See nursing assessment. Safety precautions in place; call light within reach. Emergency Department Nursing Plan of Care       The Nursing Plan of Care is developed from the Nursing assessment and Emergency Department Attending provider initial evaluation. The plan of care may be reviewed in the ED Provider note.     The Plan of Care was developed with the following considerations:   Patient / Family readiness to learn indicated by:verbalized understanding  Persons(s) to be included in education: patient  Barriers to Learning/Limitations:No    Signed     Juany Mcclain RN    6/13/2019   10:56 PM

## 2019-06-14 NOTE — ED PROVIDER NOTES
EMERGENCY DEPARTMENT HISTORY AND PHYSICAL EXAM    Date: 6/13/2019  Patient Name: Evangelina Cotton    History of Presenting Illness     Chief Complaint   Patient presents with    Hand Pain         History Provided By: Patient    HPI: Evangelina Cotton is a 36 y.o. female with a PMH of Anxiety, arthritis, asthma, bipolar, depression, hypertension who presents with hand pain. Onset today. Symptoms occurred suddenly without injury. Located on right hand. Reports a bump present. No itching, redness, hand swelling. Reports moderate pain when touched. Denies history of pain/wrist surgeries or injury has not tried anything for symptoms. PCP: None    Current Outpatient Medications   Medication Sig Dispense Refill    ibuprofen (MOTRIN) 600 mg tablet Take 1 Tab by mouth every six (6) hours as needed for Pain. 20 Tab 0    METOPROLOL SUCCINATE PO Take  by mouth daily.  TRIAMTERENE-HYDROCHLOROTHIAZID PO Take  by mouth daily.  albuterol (PROVENTIL HFA, VENTOLIN HFA) 90 mcg/actuation inhaler Take 1 puff by inhalation every four (4) hours as needed for Wheezing. 1 Inhaler 3    ondansetron (ZOFRAN ODT) 4 mg disintegrating tablet Take 1 Tab by mouth every eight (8) hours as needed for Nausea. 10 Tab 0    CYCLOBENZAPRINE HCL (FLEXERIL PO) Take  by mouth as needed.  cyclobenzaprine (FLEXERIL) 5 mg tablet Take 1 Tab by mouth three (3) times daily (with meals). 90 Tab 1    SUMAtriptan (IMITREX) 100 mg tablet Take  by mouth once as needed for Migraine.  loratadine (CLARITIN) 10 mg tablet Take 10 mg by mouth.  Hydrochlorothiazide (HYDRODIURIL) 12.5 mg tablet Take 12.5 mg by mouth daily.  montelukast (SINGULAIR) 10 mg tablet       traZODone (DESYREL) 100 mg tablet 100 mg nightly.  lisinopril (PRINIVIL, ZESTRIL) 20 mg tablet Take 1 Tab by mouth daily.  (Patient taking differently: Take 30 mg by mouth daily.) 90 Tab 3       Past History     Past Medical History:  Past Medical History:   Diagnosis Date    Anxiety disorder     Arthritis     Asthma     Bipolar 1 disorder ([facility] [address]     Depression     Frequent headaches     Frequent headaches     Hypertension     Migraine     Muscle pain     Other ill-defined conditions(799.89)     dysplagia    Psychiatric disorder     bipolar, anxiety, depression, PTSD    PTSD (post-traumatic stress disorder)        Past Surgical History:  Past Surgical History:   Procedure Laterality Date    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    HX GYN  3-    birth    HX OTHER SURGICAL      Dysplasia       Family History:  Family History   Problem Relation Age of Onset    Cancer Mother     Diabetes Father     Other Father         Aneursym   Gee Shield Migraines Sister        Social History:  Social History     Tobacco Use    Smoking status: Current Every Day Smoker     Packs/day: 0.50     Types: Cigarettes    Smokeless tobacco: Never Used   Substance Use Topics    Alcohol use: Yes     Alcohol/week: 0.0 oz     Comment: social    Drug use: Yes     Types: Marijuana     Comment: smoked  puffs today       Allergies: Allergies   Allergen Reactions    Codeine Other (comments)     Hallucinations, raise blood pressure     Lortab [Hydrocodone-Acetaminophen] Hives and Itching    Tramadol Nausea Only     Headache         Review of Systems   Review of Systems   Musculoskeletal: Positive for arthralgias (Hand pain). Negative for joint swelling. Skin: Negative for color change and wound. Neurological: Negative for numbness. All other systems reviewed and are negative. Physical Exam     Vitals:    06/13/19 2217   BP: 123/83   Pulse: 84   Resp: 18   Temp: 97.9 °F (36.6 °C)   SpO2: 99%   Weight: 85.7 kg (189 lb)   Height: 5' 3\" (1.6 m)     Physical Exam   Constitutional: She is oriented to person, place, and time. She appears well-developed and well-nourished. No distress. HENT:   Head: Normocephalic and atraumatic.    Cardiovascular: Normal rate, regular rhythm and normal heart sounds. Pulmonary/Chest: Effort normal and breath sounds normal.   Musculoskeletal: Normal range of motion. She exhibits tenderness. She exhibits no edema. Full range of motion of right wrist and hand. Sensation intact. Radial 2+ pulses bilaterally. .   Neurological: She is alert and oriented to person, place, and time. She has normal reflexes. Skin: Skin is warm and dry. Pea-sized mobile soft mass to dorsal aspect of right hand, TTP. No surrounding erythema swelling   Psychiatric: She has a normal mood and affect. Nursing note and vitals reviewed. Diagnostic Study Results     Labs -   No results found for this or any previous visit (from the past 12 hour(s)). Radiologic Studies -   No orders to display     CT Results  (Last 48 hours)    None        CXR Results  (Last 48 hours)    None            Medical Decision Making   I am the first provider for this patient. I reviewed the vital signs, available nursing notes, past medical history, past surgical history, family history and social history. Vital Signs-Reviewed the patient's vital signs. Records Reviewed: Nursing Notes and Old Medical Records            Disposition:  Discharge    DISCHARGE NOTE:   11:43 PM      Care plan outlined and precautions discussed. Patient has no new complaints, changes, or physical findings. All medications were reviewed with the patient; will d/c home with ibuprofen. All of pt's questions and concerns were addressed. Patient was instructed and agrees to follow up with PCP, as well as to return to the ED upon further deterioration. Patient is ready to go home.     Follow-up Information     Follow up With Specialties Details Why [address]    [address] [place]  [phone]          Current Discharge Medication List      START taking these medications    Details   ibuprofen (MOTRIN) 600 mg tablet Take 1 Tab by mouth every six (6) hours as needed for Pain. Qty: 20 Tab, Refills: 0         CONTINUE these medications which have NOT CHANGED    Details   METOPROLOL SUCCINATE PO Take  by mouth daily. TRIAMTERENE-HYDROCHLOROTHIAZID PO Take  by mouth daily. albuterol (PROVENTIL HFA, VENTOLIN HFA) 90 mcg/actuation inhaler Take 1 puff by inhalation every four (4) hours as needed for Wheezing. Qty: 1 Inhaler, Refills: 3    Associated Diagnoses: Asthma      ondansetron (ZOFRAN ODT) 4 mg disintegrating tablet Take 1 Tab by mouth every eight (8) hours as needed for Nausea. Qty: 10 Tab, Refills: 0      !! CYCLOBENZAPRINE HCL (FLEXERIL PO) Take  by mouth as needed. !! cyclobenzaprine (FLEXERIL) 5 mg tablet Take 1 Tab by mouth three (3) times daily (with meals). Qty: 90 Tab, Refills: 1    Associated Diagnoses: Intractable migraine, unspecified migraine type      SUMAtriptan (IMITREX) 100 mg tablet Take  by mouth once as needed for Migraine. loratadine (CLARITIN) 10 mg tablet Take 10 mg by mouth. Hydrochlorothiazide (HYDRODIURIL) 12.5 mg tablet Take 12.5 mg by mouth daily. montelukast (SINGULAIR) 10 mg tablet       traZODone (DESYREL) 100 mg tablet 100 mg nightly. lisinopril (PRINIVIL, ZESTRIL) 20 mg tablet Take 1 Tab by mouth daily. Qty: 90 Tab, Refills: 3    Associated Diagnoses: HTN (hypertension)       ! ! - Potential duplicate medications found. Please discuss with provider. Provider Notes (Medical Decision Making):   Ganglion cyst, lipoma, epidermoid cyst, hand pain    Procedures:  Procedures        Diagnosis     Clinical Impression:   1.  Ganglion cyst

## 2019-06-14 NOTE — DISCHARGE INSTRUCTIONS
Patient Education        Ganglions: Care Instructions  Your Care Instructions    A ganglion is a small sac, or cyst, filled with a clear fluid that is like jelly. A ganglion may look like a bump on the hand or wrist. It also can appear on your feet, ankles, knees, or shoulders. It is not cancer. A ganglion can grow out of the protective area, or capsule, around a joint. It also can grow on a tendon sheath, which covers the ropelike tendons that connect muscle to bone. A ganglion may hurt or cause numbness if it presses on a nerve. Many ganglions do not need treatment, and they often go away on their own. But if a ganglion hurts, becomes larger, causes numbness, or limits your activity, your doctor may want to drain it with a needle and syringe or remove it with minor surgery. Follow-up care is a key part of your treatment and safety. Be sure to make and go to all appointments, and call your doctor if you are having problems. It's also a good idea to know your test results and keep a list of the medicines you take. How can you care for yourself at home? · Wear a wrist or finger splint as directed by your doctor. It will keep your wrist or hand from moving and help reduce the fluid in the cyst. This may be all you need for the ganglion to shrink and go away. · Do not smash a ganglion with a book or other heavy object. You may break a bone or otherwise injure your wrist by trying this folk remedy, and the ganglion may return anyway. · Do not try to drain the fluid by poking the ganglion with a pin or any other sharp object. You could cause an infection. When should you call for help? Call your doctor now or seek immediate medical care if:    · You have signs of infection, such as:  ? Increased pain, swelling, warmth, or redness. ? Red streaks leading from the cyst.  ? Pus draining from the cyst.  ?  A fever.    Watch closely for changes in your health, and be sure to contact your doctor if:    · You have increasing pain.     · Your ganglion is getting larger.     · You still have pain or numbness from a ganglion. Where can you learn more? Go to http://stephen-jose.info/. Enter U678 in the search box to learn more about \"Ganglions: Care Instructions. \"  Current as of: September 20, 2018  Content Version: 11.9  © 2879-6407 [employer]. Care instructions adapted under license by [employer] (which disclaims liability or warranty for this information). If you have questions about a medical condition or this instruction, always ask your healthcare professional. [place] [address] any warranty or liability for your use of this information.

## 2019-06-14 NOTE — ED NOTES
Waqas, FNP at bedside reviewing patient's discharge instructions and reviewing medications. Patient ambulatory home. Patient in no apparent distress.

## 2019-06-14 NOTE — ED TRIAGE NOTES
C/o left hand pain x yesterday. Pt denies injury. Pt noted to have raised, red area to posterior hand.

## 2019-11-07 ENCOUNTER — OFFICE VISIT (OUTPATIENT)
Dept: FAMILY MEDICINE CLINIC | Age: 34
End: 2019-11-07

## 2019-11-07 VITALS
BODY MASS INDEX: 33.22 KG/M2 | SYSTOLIC BLOOD PRESSURE: 121 MMHG | TEMPERATURE: 97.3 F | HEIGHT: 63 IN | WEIGHT: 187.5 LBS | OXYGEN SATURATION: 100 % | HEART RATE: 77 BPM | RESPIRATION RATE: 20 BRPM | DIASTOLIC BLOOD PRESSURE: 76 MMHG

## 2019-11-07 DIAGNOSIS — J45.909 ASTHMA: ICD-10-CM

## 2019-11-07 DIAGNOSIS — F32.A ANXIETY AND DEPRESSION: ICD-10-CM

## 2019-11-07 DIAGNOSIS — F41.9 ANXIETY AND DEPRESSION: ICD-10-CM

## 2019-11-07 DIAGNOSIS — I10 ESSENTIAL HYPERTENSION: ICD-10-CM

## 2019-11-07 DIAGNOSIS — E78.5 DYSLIPIDEMIA: ICD-10-CM

## 2019-11-07 DIAGNOSIS — F31.81 BIPOLAR 2 DISORDER, MAJOR DEPRESSIVE EPISODE (HCC): Primary | ICD-10-CM

## 2019-11-07 RX ORDER — ALBUTEROL SULFATE 90 UG/1
1 AEROSOL, METERED RESPIRATORY (INHALATION)
Qty: 1 INHALER | Refills: 3 | Status: SHIPPED | OUTPATIENT
Start: 2019-11-07 | End: 2020-06-25 | Stop reason: SDUPTHER

## 2019-11-07 RX ORDER — QUETIAPINE FUMARATE 25 MG/1
TABLET, FILM COATED ORAL
COMMUNITY
End: 2019-11-07 | Stop reason: SDUPTHER

## 2019-11-07 RX ORDER — DULOXETIN HYDROCHLORIDE 30 MG/1
30 CAPSULE, DELAYED RELEASE ORAL DAILY
COMMUNITY
End: 2019-11-07 | Stop reason: SDUPTHER

## 2019-11-07 RX ORDER — LAMOTRIGINE 100 MG/1
100 TABLET ORAL 2 TIMES DAILY
Qty: 60 TAB | Refills: 1 | Status: SHIPPED | OUTPATIENT
Start: 2019-11-07 | End: 2019-12-05 | Stop reason: SDUPTHER

## 2019-11-07 RX ORDER — ACETAMINOPHEN AND CODEINE PHOSPHATE 120; 12 MG/5ML; MG/5ML
1 SOLUTION ORAL DAILY
COMMUNITY
End: 2020-01-08 | Stop reason: SDUPTHER

## 2019-11-07 RX ORDER — QUETIAPINE FUMARATE 25 MG/1
50 TABLET, FILM COATED ORAL
Qty: 60 TAB | Refills: 1 | Status: SHIPPED | OUTPATIENT
Start: 2019-11-07 | End: 2019-12-05 | Stop reason: SDUPTHER

## 2019-11-07 RX ORDER — AMLODIPINE BESYLATE 5 MG/1
5 TABLET ORAL DAILY
COMMUNITY
End: 2019-11-07 | Stop reason: ALTCHOICE

## 2019-11-07 RX ORDER — DULOXETIN HYDROCHLORIDE 30 MG/1
30 CAPSULE, DELAYED RELEASE ORAL DAILY
Qty: 30 CAP | Refills: 0 | Status: SHIPPED | OUTPATIENT
Start: 2019-11-07 | End: 2019-12-05 | Stop reason: SDUPTHER

## 2019-11-07 RX ORDER — LAMOTRIGINE 100 MG/1
TABLET ORAL 2 TIMES DAILY
COMMUNITY
End: 2019-11-07 | Stop reason: SDUPTHER

## 2019-11-07 RX ORDER — CLONAZEPAM 1 MG/1
TABLET ORAL
COMMUNITY
End: 2019-11-07 | Stop reason: SDUPTHER

## 2019-11-07 RX ORDER — CLONAZEPAM 1 MG/1
1 TABLET ORAL
Qty: 60 TAB | Refills: 0 | Status: SHIPPED | OUTPATIENT
Start: 2019-11-07 | End: 2019-12-05 | Stop reason: SDUPTHER

## 2019-11-07 RX ORDER — LORATADINE 10 MG/1
10 TABLET ORAL
Qty: 30 TAB | Refills: 1 | Status: SHIPPED | OUTPATIENT
Start: 2019-11-07 | End: 2020-04-01 | Stop reason: SDUPTHER

## 2019-11-07 RX ORDER — FLUCONAZOLE 150 MG/1
150 TABLET ORAL DAILY
Qty: 1 TAB | Refills: 0 | Status: SHIPPED | OUTPATIENT
Start: 2019-11-07 | End: 2019-11-08

## 2019-11-07 NOTE — PROGRESS NOTES
Name and  verified      Chief Complaint   Patient presents with   [address] Patient       Patient stated changing PCP due to dissatisfied with prior PCP.

## 2019-11-07 NOTE — PROGRESS NOTES
HISTORY OF PRESENT ILLNESS  Telma Jovel is a 36 y.o. female. HPI   HTN, gestational   Today pt present for Bp check and on no bp meds, having had the low salt diet ,  has been active, patien does not obtain the bp at home ,,on no ccb no hctz, no bb, no lisinoprol today the pt denies Chest Pain, has no legs swelling no lightheadedness,  Morning HA   Started few yrs Ago, one sided pulsating lasting few hrs, has tried otc not helping, pt states that the HA Worsens by lights and loud noises,   the pain is associated with feeling of  Nausea, and pt hadno voimiting the pain is 7/10 at this time,  if the HA occurs the pt is unable to do any work while having the HA,   Ran out in  feb   Pt present with many period of unknown abnormally and many irritable moods that are consistent and a persistent increased in activity or energy, for which sometimes lasts days and present most of the day, sometimes every day for which has had some peroids of hospitalization at mental health clinic, stating that during the period of mood disturbance, pt has increased energy , or likes to be active at all time, with sense of inflated self-esteem or grandiosity, and Decreased need for sleep and then states taht the pt feels rested after onlycouple hours of sleep, some times just wants to talk a lot, has wondering ideas that the pt's thoughts are racing, patient denies any suicidal homicidal ideation no hallucination or delusion pt states that all started at an early age and  Patient states that sometimes with the treatment of antidepressant the patient became to be more panicky, and did not like the treatment, has been tried many medication but unfortunately not responding to much,       Current Outpatient Medications   Medication Sig Dispense Refill    DULoxetine (CYMBALTA) 30 mg capsule Take 30 mg by mouth daily.  clonazePAM (KLONOPIN) 1 mg tablet Take  by mouth three (3) times daily as needed.       lamoTRIgine (LAMICTAL) 100 mg tablet Take  by mouth two (2) times a day.  QUEtiapine (SEROQUEL) 25 mg tablet Take 1-2 tablets by mouth at bedtime as needed for anxiety/agitation/sleep      norethindrone (CINDY-BE) 0.35 mg tab Take 1 Tab by mouth daily.  albuterol (PROVENTIL HFA, VENTOLIN HFA) 90 mcg/actuation inhaler Take 1 puff by inhalation every four (4) hours as needed for Wheezing. 1 Inhaler 3    norethindrone (CINDY-BE PO) Take  by mouth.  ibuprofen (MOTRIN) 600 mg tablet Take 1 Tab by mouth every six (6) hours as needed for Pain. 20 Tab 0    ondansetron (ZOFRAN ODT) 4 mg disintegrating tablet Take 1 Tab by mouth every eight (8) hours as needed for Nausea. 10 Tab 0    METOPROLOL SUCCINATE PO Take  by mouth daily.  TRIAMTERENE-HYDROCHLOROTHIAZID PO Take  by mouth daily.  CYCLOBENZAPRINE HCL (FLEXERIL PO) Take  by mouth as needed.  cyclobenzaprine (FLEXERIL) 5 mg tablet Take 1 Tab by mouth three (3) times daily (with meals). 90 Tab 1    SUMAtriptan (IMITREX) 100 mg tablet Take  by mouth once as needed for Migraine.  loratadine (CLARITIN) 10 mg tablet Take 10 mg by mouth.  Hydrochlorothiazide (HYDRODIURIL) 12.5 mg tablet Take 12.5 mg by mouth daily.  montelukast (SINGULAIR) 10 mg tablet       traZODone (DESYREL) 100 mg tablet 100 mg nightly.  lisinopril (PRINIVIL, ZESTRIL) 20 mg tablet Take 1 Tab by mouth daily.  (Patient taking differently: Take 30 mg by mouth daily.) 90 Tab 3     Allergies   Allergen Reactions    Codeine Other (comments)     Hallucinations, raise blood pressure     Lortab [Hydrocodone-Acetaminophen] Hives and Itching    Tramadol Nausea Only     Headache     Past Medical History:   Diagnosis Date    Anxiety disorder     Arthritis     Asthma     Bipolar 1 disorder (HCC)     Depression     Frequent headaches     Frequent headaches     Hypertension     Migraine     Muscle pain     Other ill-defined conditions(855.88)     dysplagia    Psychiatric disorder bipolar, anxiety, depression, PTSD    PTSD (post-traumatic stress disorder)      Past Surgical History:   Procedure Laterality Date    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    HX GYN  3-    birth   Adela Arriaga HX OTHER SURGICAL      Dysplasia     Family History   Problem Relation Age of Onset   Adela Arriaga Cancer Mother     Diabetes Father     Other Father         Aneursym    Migraines Sister      Social History     Tobacco Use    Smoking status: Current Every Day Smoker     Packs/day: 0.50     Types: Cigarettes    Smokeless tobacco: Never Used   Substance Use Topics    Alcohol use: Yes     Alcohol/week: 0.0 standard drinks     Comment: social      Lab Results   Component Value Date/Time    WBC 6.3 12/09/2015 12:00 PM    HGB 11.5 12/09/2015 12:00 PM    HCT 34.9 (L) 12/09/2015 12:00 PM    PLATELET 786 13/37/4527 12:00 PM    MCV 88.8 12/09/2015 12:00 PM     No results found for: TSH, TSH2, TSH3, TSHP, TSHELE, TSHEXT, TT3, T3U, T3UP, FRT3, FT3, FT4, FT4P, T4, T4P, FT4T, TT7, TSHEXT      Review of Systems   Constitutional: Negative for chills and fever. HENT: Negative for nosebleeds. Eyes: Negative for pain. Respiratory: Negative for cough and wheezing. Cardiovascular: Negative for chest pain and leg swelling. Gastrointestinal: Negative for constipation, diarrhea and nausea. Genitourinary: Negative for frequency. Musculoskeletal: Negative for joint pain and myalgias. Skin: Negative for rash. Neurological: Positive for headaches. Negative for loss of consciousness. Endo/Heme/Allergies: Does not bruise/bleed easily. Psychiatric/Behavioral: Negative for depression. The patient is not nervous/anxious and does not have insomnia. All other systems reviewed and are negative. Physical Exam   Constitutional: She is oriented to person, place, and time. She appears well-developed and well-nourished. HENT:   Head: Normocephalic and atraumatic.    Eyes: Conjunctivae and EOM are normal. Neck: Normal range of motion. Neck supple. Cardiovascular: Normal rate, regular rhythm and normal heart sounds. No murmur heard. Pulmonary/Chest: Effort normal and breath sounds normal.   Abdominal: Soft. Bowel sounds are normal. She exhibits no distension. Musculoskeletal: Normal range of motion. She exhibits no edema. Neurological: She is alert and oriented to person, place, and time. Skin: No erythema. Psychiatric: Her behavior is normal.   Nursing note and vitals reviewed. ASSESSMENT and PLAN  Diagnoses and all orders for this visit:    1. Bipolar 2 disorder, major depressive episode (HCC)  -     clonazePAM (KLONOPIN) 1 mg tablet; Take 1 Tab by mouth two (2) times daily as needed for Other (panic). Max Daily Amount: 2 mg.  -     LAMOTRIGINE (LAMICTAL)  -     CBC W/O DIFF  -     LIPID PANEL  -     HEMOGLOBIN A1C WITH EAG  -     TSH 3RD GENERATION  -     METABOLIC PANEL, COMPREHENSIVE  -     AMB POC URINALYSIS DIP STICK AUTO W/O MICRO    2. Asthma  -     albuterol (PROVENTIL HFA, VENTOLIN HFA, PROAIR HFA) 90 mcg/actuation inhaler; Take 1 Puff by inhalation every four (4) hours as needed for Wheezing. 3. Essential hypertension  -     LAMOTRIGINE (LAMICTAL)  -     CBC W/O DIFF  -     LIPID PANEL  -     HEMOGLOBIN A1C WITH EAG  -     TSH 3RD GENERATION  -     METABOLIC PANEL, COMPREHENSIVE  -     AMB POC URINALYSIS DIP STICK AUTO W/O MICRO    4. Anxiety and depression  -     clonazePAM (KLONOPIN) 1 mg tablet; Take 1 Tab by mouth two (2) times daily as needed for Other (panic). Max Daily Amount: 2 mg.    5. Dyslipidemia  -     LAMOTRIGINE (LAMICTAL)  -     CBC W/O DIFF  -     LIPID PANEL  -     HEMOGLOBIN A1C WITH EAG  -     TSH 3RD GENERATION  -     METABOLIC PANEL, COMPREHENSIVE  -     AMB POC URINALYSIS DIP STICK AUTO W/O MICRO    Other orders  -     DULoxetine (CYMBALTA) 30 mg capsule; Take 1 Cap by mouth daily. -     lamoTRIgine (LAMICTAL) 100 mg tablet;  Take 1 Tab by mouth two (2) times a day.  -     loratadine (CLARITIN) 10 mg tablet; Take 1 Tab by mouth daily as needed for Allergies. -     QUEtiapine (SEROQUEL) 25 mg tablet; Take 2 Tabs by mouth nightly. Take 1-2 tablets by mouth at bedtime as needed for anxiety/agitation/sleep  -     fluconazole (DIFLUCAN) 150 mg tablet; Take 1 Tab by mouth daily for 1 day. FDA advises cautious prescribing of oral fluconazole in pregnancy.

## 2019-11-07 NOTE — PATIENT INSTRUCTIONS
Insomnia: Care Instructions  Your Care Instructions    Insomnia is the inability to sleep well. It is a common problem for most people at some time. Insomnia may make it hard for you to get to sleep, stay asleep, or sleep as long as you need to. This can make you tired and grouchy during the day. It can also make you forgetful, less effective at work, and unhappy. Insomnia can be caused by conditions such as depression or anxiety. Pain can also affect your ability to sleep. When these problems are solved, the insomnia usually clears up. But sometimes bad sleep habits can cause insomnia. If insomnia is affecting your work or your enjoyment of life, you can take steps to improve your sleep. Follow-up care is a key part of your treatment and safety. Be sure to make and go to all appointments, and call your doctor if you are having problems. It's also a good idea to know your test results and keep a list of the medicines you take. How can you care for yourself at home? What to avoid  · Do not have drinks with caffeine, such as coffee or black tea, for 8 hours before bed. · Do not smoke or use other types of tobacco near bedtime. Nicotine is a stimulant and can keep you awake. · Avoid drinking alcohol late in the evening, because it can cause you to wake in the middle of the night. · Do not eat a big meal close to bedtime. If you are hungry, eat a light snack. · Do not drink a lot of water close to bedtime, because the need to urinate may wake you up during the night. · Do not read or watch TV in bed. Use the bed only for sleeping and sexual activity. What to try  · Go to bed at the same time every night, and wake up at the same time every morning. Do not take naps during the day. · Keep your bedroom quiet, dark, and cool. · Sleep on a comfortable pillow and mattress. · If watching the clock makes you anxious, turn it facing away from you so you cannot see the time.   · If you worry when you lie down, start a worry book. Well before bedtime, write down your worries, and then set the book and your concerns aside. · Try meditation or other relaxation techniques before you go to bed. · If you cannot fall asleep, get up and go to another room until you feel sleepy. Do something relaxing. Repeat your bedtime routine before you go to bed again. · Make your house quiet and calm about an hour before bedtime. Turn down the lights, turn off the TV, log off the computer, and turn down the volume on music. This can help you relax after a busy day. When should you call for help? Watch closely for changes in your health, and be sure to contact your doctor if:    · Your efforts to improve your sleep do not work.     · Your insomnia gets worse.     · You have been feeling down, depressed, or hopeless or have lost interest in things that you usually enjoy. Where can you learn more? Go to http://stephen-jose.info/. Enter P513 in the search box to learn more about \"Insomnia: Care Instructions. \"  Current as of: April 7, 2019  Content Version: 12.2  © 5375-0165 [employer]. Care instructions adapted under license by [employer] (which disclaims liability or warranty for this information). If you have questions about a medical condition or this instruction, always ask your healthcare professional. [place] [address] any warranty or liability for your use of this information. Bipolar Disorder: Care Instructions  Your Care Instructions    Bipolar disorder is an illness that causes extreme mood changes, from times of very high energy (manic episodes) to times of depression. But many people with bipolar disorder show only the symptoms of depression. These moods may cause problems with your work, school, family life, friendships, and how well you function. This disease is also called manic-depression.   There is no cure for bipolar disorder, but it can be helped with medicines. Counseling may also help. It is important to take your medicines exactly as prescribed, even when you feel well. You may need lifelong treatment. Follow-up care is a key part of your treatment and safety. Be sure to make and go to all appointments, and call your doctor if you are having problems. It's also a good idea to know your test results and keep a list of the medicines you take. How can you care for yourself at home? · Be safe with medicines. Take your medicines exactly as prescribed. Do not stop or change a medicine without talking to your doctor first. [place] and your doctor may need to try different combinations of medicines to find what works for you. · Take your medicines on schedule to keep your moods even. When you feel good, you may think that you do not need your medicines. But it is important to keep taking them. · Go to your counseling sessions. Call and talk with your counselor if you can't go to a session or if you don't think the sessions are helping. Do not just stop going. · Get at least 30 minutes of activity on most days of the week. Walking is a good choice. You also may want to do other things, such as running, swimming, or cycling. · Get enough sleep. Keep your room dark and quiet. Try to go to bed at the same time every night. · Eat a healthy diet. This includes whole grains, dairy, fruits, vegetables, and protein. Eat foods from each of these groups. · Try to lower your stress. Manage your time, build a strong support system, and lead a healthy lifestyle. To lower your stress, try physical activity, slow deep breathing, or getting a massage. · Do not use alcohol or illegal drugs. · Learn the early signs of your mood changes. You can then take steps to help yourself feel better. · Ask for help from friends and family when you need it. You may need help with daily chores when you are depressed.  When you are manic, you may need support to control your high energy levels. What should you do if someone in your family has bipolar disorder? · Learn about the disease and the signs that it is getting worse. · Remind your family member that you love him or her. · Make a plan with all family members about how to take care of your loved one when his or her symptoms are bad. · Talk about your fears and concerns and those of other family members. Seek counseling if needed. · Do not focus attention only on the person who is in treatment. · Remind yourself that it will take time for changes to occur. · Do not blame yourself for the disease. · Know your legal rights and the legal rights of your family member. Support groups or counselors can help you with this information. · Take care of yourself. Keep up with your own interests, such as your career, hobbies, and friends. Use exercise, positive self-talk, deep breathing, and other relaxing exercises to help lower your stress. · Give yourself time to grieve. You may need to deal with emotions such as anger, fear, and frustration. After you work through your feelings, you will be better able to care for yourself and your family. · If you are having a hard time with your feelings or with your relationship with your family member, talk with a counselor. When should you call for help? Call 911 anytime you think you may need emergency care. For example, call if:    · You feel like hurting yourself or someone else.     · Someone who has bipolar disorder displays dangerous behavior, and you think the person might hurt himself or herself or someone else.   [facility] your doctor now or seek immediate medical care if:    · You hear voices.     · Someone you know has bipolar disorder and talks about suicide. Keep the numbers for these national suicide hotlines: 4-442-232-TALK (6-132.169.9035) and 4-760-WMCDIDC (6-973.101.7986).  If a suicide threat seems real, with a specific plan and a way to carry it out, stay with the person, or ask someone you trust to stay with the person, until you can get help.     · Someone you know has bipolar disorder and:  ? Starts to give away possessions. ? Is using illegal drugs or drinking alcohol heavily. ? Talks or writes about death, including writing suicide notes or talking about guns, knives, or pills. ? Talks or writes about hurting someone else. ? Starts to spend a lot of time alone. ? Acts very aggressively or suddenly appears calm. ? Talks about beliefs that are not based in reality (delusions).    Watch closely for changes in your health, and be sure to contact your doctor if:    · You cannot go to your counseling sessions. Where can you learn more? Go to http://stephen-jose.info/. Enter K052 in the search box to learn more about \"Bipolar Disorder: Care Instructions. \"  Current as of: May 28, 2019  Content Version: 12.2  © 2030-4864 [employer]. Care instructions adapted under license by [employer] (which disclaims liability or warranty for this information). If you have questions about a medical condition or this instruction, always ask your healthcare professional. [place] [address] any warranty or liability for your use of this information. Well Visit, Ages 25 to 48: Care Instructions  Your Care Instructions    Physical exams can help you stay healthy. Your doctor has checked your overall health and may have suggested ways to take good care of yourself. He or she also may have recommended tests. At home, you can help prevent illness with healthy eating, regular exercise, and other steps. Follow-up care is a key part of your treatment and safety. Be sure to make and go to all appointments, and call your doctor if you are having problems. It's also a good idea to know your test results and keep a list of the medicines you take. How can you care for yourself at home? · Reach and stay at a healthy weight.  This will lower your risk for many problems, such as obesity, diabetes, heart disease, and high blood pressure. · Get at least 30 minutes of physical activity on most days of the week. Walking is a good choice. You also may want to do other activities, such as running, swimming, cycling, or playing tennis or team sports. Discuss any changes in your exercise program with your doctor. · Do not smoke or allow others to smoke around you. If you need help quitting, talk to your doctor about stop-smoking programs and medicines. These can increase your chances of quitting for good. · Talk to your doctor about whether you have any risk factors for sexually transmitted infections (STIs). Having one sex partner (who does not have STIs and does not have sex with anyone else) is a good way to avoid these infections. · Use birth control if you do not want to have children at this time. Talk with your doctor about the choices available and what might be best for you. · Protect your skin from too much sun. When you're outdoors from 10 a.m. to 4 p.m., stay in the shade or cover up with clothing and a hat with a wide brim. Wear sunglasses that block UV rays. Even when it's cloudy, put broad-spectrum sunscreen (SPF 30 or higher) on any exposed skin. · See a dentist one or two times a year for checkups and to have your teeth cleaned. · Wear a seat belt in the car. Follow your doctor's advice about when to have certain tests. These tests can spot problems early. For everyone  · Cholesterol. Have the fat (cholesterol) in your blood tested after age 21. Your doctor will tell you how often to have this done based on your age, family history, or other things that can increase your risk for heart disease. · Blood pressure. Have your blood pressure checked during a routine doctor visit. Your doctor will tell you how often to check your blood pressure based on your age, your blood pressure results, and other factors. · Vision.  Talk with your doctor about how often to have a glaucoma test.  · Diabetes. Ask your doctor whether you should have tests for diabetes. · Colon cancer. Your risk for colorectal cancer gets higher as you get older. Some experts say that adults should start regular screening at age 48 and stop at age 76. Others say to start before age 48 or continue after age 76. Talk with your doctor about your risk and when to start and stop screening. For women  · Breast exam and mammogram. Talk to your doctor about when you should have a clinical breast exam and a mammogram. Medical experts differ on whether and how often women under 50 should have these tests. Your doctor can help you decide what is right for you. · Cervical cancer screening test and pelvic exam. Begin with a Pap test at age 24. The test often is part of a pelvic exam. Starting at age 27, you may choose to have a Pap test, an HPV test, or both tests at the same time (called co-testing). Talk with your doctor about how often to have testing. · Tests for sexually transmitted infections (STIs). Ask whether you should have tests for STIs. You may be at risk if you have sex with more than one person, especially if your partners do not wear condoms. For men  · Tests for sexually transmitted infections (STIs). Ask whether you should have tests for STIs. You may be at risk if you have sex with more than one person, especially if you do not wear a condom. · Testicular cancer exam. Ask your doctor whether you should check your testicles regularly. · Prostate exam. Talk to your doctor about whether you should have a blood test (called a PSA test) for prostate cancer. Experts differ on whether and when men should have this test. Some experts suggest it if you are older than 39 and are -American or have a father or brother who got prostate cancer when he was younger than 72. When should you call for help?   Watch closely for changes in your health, and be sure to contact your doctor if you have any problems or symptoms that concern you. Where can you learn more? Go to http://stephen-jose.info/. Enter P072 in the search box to learn more about \"Well Visit, Ages 25 to 48: Care Instructions. \"  Current as of: December 13, 2018  Content Version: 12.2  © 0769-5178 [employer]. Care instructions adapted under license by [employer] (which disclaims liability or warranty for this information). If you have questions about a medical condition or this instruction, always ask your healthcare professional. [address] any warranty or liability for your use of this information.

## 2019-12-05 ENCOUNTER — OFFICE VISIT (OUTPATIENT)
Dept: FAMILY MEDICINE CLINIC | Age: 34
End: 2019-12-05

## 2019-12-05 VITALS
TEMPERATURE: 97.7 F | SYSTOLIC BLOOD PRESSURE: 148 MMHG | OXYGEN SATURATION: 99 % | DIASTOLIC BLOOD PRESSURE: 86 MMHG | WEIGHT: 184.3 LBS | HEART RATE: 82 BPM | RESPIRATION RATE: 20 BRPM | HEIGHT: 63 IN | BODY MASS INDEX: 32.66 KG/M2

## 2019-12-05 DIAGNOSIS — F31.81 BIPOLAR 2 DISORDER, MAJOR DEPRESSIVE EPISODE (HCC): ICD-10-CM

## 2019-12-05 DIAGNOSIS — F41.9 ANXIETY AND DEPRESSION: Primary | ICD-10-CM

## 2019-12-05 DIAGNOSIS — F32.A ANXIETY AND DEPRESSION: Primary | ICD-10-CM

## 2019-12-05 RX ORDER — LAMOTRIGINE 100 MG/1
100 TABLET ORAL 2 TIMES DAILY
Qty: 60 TAB | Refills: 3 | Status: SHIPPED | OUTPATIENT
Start: 2019-12-05 | End: 2020-03-25 | Stop reason: SDUPTHER

## 2019-12-05 RX ORDER — DULOXETIN HYDROCHLORIDE 30 MG/1
30 CAPSULE, DELAYED RELEASE ORAL DAILY
Qty: 30 CAP | Refills: 3 | Status: SHIPPED | OUTPATIENT
Start: 2019-12-05 | End: 2020-03-25 | Stop reason: SDUPTHER

## 2019-12-05 RX ORDER — QUETIAPINE FUMARATE 25 MG/1
50 TABLET, FILM COATED ORAL
Qty: 60 TAB | Refills: 3 | Status: SHIPPED | OUTPATIENT
Start: 2019-12-05 | End: 2020-03-25 | Stop reason: SDUPTHER

## 2019-12-05 RX ORDER — CLONAZEPAM 1 MG/1
1 TABLET ORAL
Qty: 60 TAB | Refills: 3 | Status: SHIPPED | OUTPATIENT
Start: 2019-12-05 | End: 2020-03-25 | Stop reason: SDUPTHER

## 2019-12-05 NOTE — PATIENT INSTRUCTIONS

## 2019-12-05 NOTE — PROGRESS NOTES
Name and  verified        Chief Complaint   Patient presents with    Medication Refill       Patient stated upcoming appointment with Psychiatry 2020.        1. Have you been to the ER, urgent care clinic since your last visit? Hospitalized since your last visit? no    2. Have you seen or consulted any other health care providers outside of the [address] [place] [address] since your last visit? Include any pap smears or colon screening.   no

## 2019-12-05 NOTE — PROGRESS NOTES
HISTORY OF PRESENT ILLNESS  Mercedez Dey is a 36 y.o. female. HPI     Patient also with depression with the anxiety and panic states of mind, stating that clonazepam has helped the current serious medical condition and the tremor,needs refill, tried to come off but became unstable take it as neededfor panic attack no tab at this time, aware of its side effects and dependency  Has been taking them for yrs,   Patient's current medical condition is not controlled without medications, not able to tolerate any SSRI or other recommended antidepressive or antianxiety meds except for the current RANJIT enhancers,   Patient state that it is getting better: pt states and reports of feeling less anxius, less guilty feeling,  less Hoplessness,ns/nh,ni,nh, less trouble with weight gain or loss,no tendency of etoh or illicit drug use, more ability of sleep, more ablitiy  to concentrate at work(patient is able to work 8-12 hrs per week at this time) and at home with the current medications,and all together a safe feeling at home and at work        Current Outpatient Medications   Medication Sig Dispense Refill    DULoxetine (CYMBALTA) 30 mg capsule Take 1 Cap by mouth daily. 30 Cap 3    lamoTRIgine (LAMICTAL) 100 mg tablet Take 1 Tab by mouth two (2) times a day. 60 Tab 3    QUEtiapine (SEROQUEL) 25 mg tablet Take 2 Tabs by mouth nightly. Take 1-2 tablets by mouth at bedtime as needed for anxiety/agitation/sleep 60 Tab 3    clonazePAM (KLONOPIN) 1 mg tablet Take 1 Tab by mouth two (2) times daily as needed for Other (panic). Max Daily Amount: 2 mg. 60 Tab 3    norethindrone (CINDY-BE) 0.35 mg tab Take 1 Tab by mouth daily.  albuterol (PROVENTIL HFA, VENTOLIN HFA, PROAIR HFA) 90 mcg/actuation inhaler Take 1 Puff by inhalation every four (4) hours as needed for Wheezing. 1 Inhaler 3    loratadine (CLARITIN) 10 mg tablet Take 1 Tab by mouth daily as needed for Allergies.  30 Tab 1    norethindrone (CINDY-BE PO) Take  by mouth daily. Allergies   Allergen Reactions    Codeine Other (comments)     Hallucinations, raise blood pressure     Lortab [Hydrocodone-Acetaminophen] Hives and Itching    Tramadol Nausea Only     Headache     Past Medical History:   Diagnosis Date    Anxiety disorder     Arthritis     Asthma     Bipolar 1 disorder (HCC)     Depression     Frequent headaches     Frequent headaches     Hypertension     Migraine     Muscle pain     Other ill-defined conditions(799.89)     dysplagia    Psychiatric disorder     bipolar, anxiety, depression, PTSD    PTSD (post-traumatic stress disorder)      Past Surgical History:   Procedure Laterality Date    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    HX GYN  3-    birth   [facility] HX OTHER SURGICAL      Dysplasia     Family History   Problem Relation Age of Onset   [facility] Cancer Mother     Diabetes Father     Other Father         Aneursym    Migraines Sister      Social History     Tobacco Use    Smoking status: Current Every Day Smoker     Packs/day: 0.50     Types: Cigarettes    Smokeless tobacco: Never Used   Substance Use Topics    Alcohol use: Yes     Alcohol/week: 0.0 standard drinks     Comment: social      Lab Results   Component Value Date/Time    WBC 6.3 12/09/2015 12:00 PM    HGB 11.5 12/09/2015 12:00 PM    HCT 34.9 (L) 12/09/2015 12:00 PM    PLATELET 566 67/76/6637 12:00 PM    MCV 88.8 12/09/2015 12:00 PM     No results found for: TSH, TSH2, TSH3, TSHP, TSHELE, TSHEXT, TT3, T3U, T3UP, FRT3, FT3, FT4, FT4P, T4, T4P, FT4T, TT7, TSHEXT      Review of Systems   Constitutional: Negative for chills and fever. HENT: Negative for ear pain and nosebleeds. Eyes: Negative for blurred vision, pain and discharge. Respiratory: Negative for shortness of breath. Cardiovascular: Negative for chest pain and leg swelling. Gastrointestinal: Negative for constipation, diarrhea, nausea and vomiting. Genitourinary: Negative for frequency. Musculoskeletal: Negative for joint pain. Skin: Negative for itching and rash. Neurological: Negative for headaches. Psychiatric/Behavioral: Positive for depression. Negative for hallucinations, substance abuse and suicidal ideas. The patient is nervous/anxious and has insomnia. Physical Exam  Vitals signs and nursing note reviewed. Constitutional:       Appearance: She is well-developed. HENT:      Head: Normocephalic and atraumatic. Eyes:      Conjunctiva/sclera: Conjunctivae normal.   Neck:      Musculoskeletal: Normal range of motion and neck supple. Cardiovascular:      Rate and Rhythm: Normal rate and regular rhythm. Heart sounds: Normal heart sounds. No murmur. Pulmonary:      Effort: Pulmonary effort is normal.      Breath sounds: Normal breath sounds. Abdominal:      General: Bowel sounds are normal. There is no distension. Palpations: Abdomen is soft. Musculoskeletal: Normal range of motion. Skin:     Findings: No erythema. Neurological:      Mental Status: She is alert and oriented to person, place, and time. Psychiatric:         Behavior: Behavior normal.         ASSESSMENT and PLAN  Diagnoses and all orders for this visit:    1. Anxiety and depression  -     DULoxetine (CYMBALTA) 30 mg capsule; Take 1 Cap by mouth daily. -     lamoTRIgine (LAMICTAL) 100 mg tablet; Take 1 Tab by mouth two (2) times a day. -     QUEtiapine (SEROQUEL) 25 mg tablet; Take 2 Tabs by mouth nightly. Take 1-2 tablets by mouth at bedtime as needed for anxiety/agitation/sleep  -     clonazePAM (KLONOPIN) 1 mg tablet; Take 1 Tab by mouth two (2) times daily as needed for Other (panic). Max Daily Amount: 2 mg.    2. Bipolar 2 disorder, major depressive episode (HCC)  -     DULoxetine (CYMBALTA) 30 mg capsule; Take 1 Cap by mouth daily. -     lamoTRIgine (LAMICTAL) 100 mg tablet; Take 1 Tab by mouth two (2) times a day. -     QUEtiapine (SEROQUEL) 25 mg tablet;  Take 2 Tabs by mouth nightly. Take 1-2 tablets by mouth at bedtime as needed for anxiety/agitation/sleep  -     clonazePAM (KLONOPIN) 1 mg tablet; Take 1 Tab by mouth two (2) times daily as needed for Other (panic). Max Daily Amount: 2 mg.       Depression with anxiety in a stable condition, not suicidal, start antianxiety and calming medication for now will reevaluate in 3 m for progression   in addition Counseling , social support, spiritual belonging, inc physical activity, all offered,  compliance advised, patient was told that should not mix any of current medication with any other illicit drugs, should not drink any alcoholic beverages while on such medication patient was also told to not operate any machinery while under the influence patient acknowledged understanding and agreed with today's recommendation in addition patient was told to call for any concern

## 2020-01-08 RX ORDER — ACETAMINOPHEN AND CODEINE PHOSPHATE 120; 12 MG/5ML; MG/5ML
1 SOLUTION ORAL DAILY
Qty: 1 DOSE PACK | Refills: 11 | Status: SHIPPED | OUTPATIENT
Start: 2020-01-08 | End: 2020-08-31 | Stop reason: ALTCHOICE

## 2020-03-25 ENCOUNTER — OFFICE VISIT (OUTPATIENT)
Dept: FAMILY MEDICINE CLINIC | Age: 35
End: 2020-03-25

## 2020-03-25 VITALS
TEMPERATURE: 98.4 F | RESPIRATION RATE: 17 BRPM | SYSTOLIC BLOOD PRESSURE: 110 MMHG | BODY MASS INDEX: 34.84 KG/M2 | OXYGEN SATURATION: 99 % | HEIGHT: 63 IN | HEART RATE: 87 BPM | DIASTOLIC BLOOD PRESSURE: 70 MMHG | WEIGHT: 196.6 LBS

## 2020-03-25 DIAGNOSIS — F41.9 ANXIETY AND DEPRESSION: ICD-10-CM

## 2020-03-25 DIAGNOSIS — F31.81 BIPOLAR 2 DISORDER, MAJOR DEPRESSIVE EPISODE (HCC): ICD-10-CM

## 2020-03-25 DIAGNOSIS — F32.A ANXIETY AND DEPRESSION: ICD-10-CM

## 2020-03-25 RX ORDER — LAMOTRIGINE 100 MG/1
100 TABLET ORAL 2 TIMES DAILY
Qty: 60 TAB | Refills: 3 | Status: SHIPPED | OUTPATIENT
Start: 2020-03-25 | End: 2020-06-25 | Stop reason: SDUPTHER

## 2020-03-25 RX ORDER — DULOXETIN HYDROCHLORIDE 30 MG/1
30 CAPSULE, DELAYED RELEASE ORAL DAILY
Qty: 30 CAP | Refills: 3 | Status: SHIPPED | OUTPATIENT
Start: 2020-03-25 | End: 2020-06-25 | Stop reason: SDUPTHER

## 2020-03-25 RX ORDER — QUETIAPINE FUMARATE 25 MG/1
TABLET, FILM COATED ORAL
Qty: 60 TAB | Refills: 3 | Status: SHIPPED | OUTPATIENT
Start: 2020-03-25 | End: 2020-06-25 | Stop reason: SDUPTHER

## 2020-03-25 RX ORDER — CLONAZEPAM 1 MG/1
1 TABLET ORAL
Qty: 60 TAB | Refills: 3 | Status: SHIPPED | OUTPATIENT
Start: 2020-03-25 | End: 2020-06-25 | Stop reason: SDUPTHER

## 2020-03-25 NOTE — PROGRESS NOTES
Name and  verified      Chief Complaint   Patient presents with    Medication Refill         Health Maintenance reviewed-discussed with patient. 1. Have you been to the ER, urgent care clinic since your last visit? Hospitalized since your last visit? no    2. Have you seen or consulted any other health care providers outside of the [address] [place] [address] since your last visit? Include any pap smears or colon screening.  no

## 2020-03-25 NOTE — PATIENT INSTRUCTIONS
Benzodiazepines: Potential side effects of benzodiazepine medications include, but are not limited to, the possibility of \"paradoxical agitation\" with irritability, aggressiveness or stimulated behavior; clumsiness, slurring of speech, dulled facies, psychomotor impairment, anterograde amnesia, impaired awareness of degree of drug effect, visual and hearing sensitivity impairment, other psychiatric/behavioral disturbances, impacts operating certain machinery or engaging in certain activities or employment, anxiety, insomnia, anorexia, tremor, nausea, vomiting, diarrhea and potential to develop tolerance, dependence, addiction and death from overdose. Learning About Mood Disorders What are mood disorders? Mood disorders are medical problems that affect how you feel. They can impact your moods, thoughts, and actions. Mood disorders include: · Depression. This causes you to feel sad or hopeless for much of the time. · Bipolar disorder. This causes extreme mood changes from manic episodes of very high energy to extreme lows of depression. · Seasonal affective disorder (SAD). This is a type of depression that affects you during the same season each year. Most often people experience SAD during the fall and winter months when days are shorter and there is less light. What are the symptoms? Depression You may: · Feel sad or hopeless nearly every day. · Lose interest in or not get pleasure from most daily activities. You feel this way nearly every day. · Have low energy, changes in your appetite, or changes in how well you sleep. · Have trouble concentrating. · Think about death and suicide. Keep the numbers for these national suicide hotlines: 1-388-511-TALK (5-921.443.2224) and 5-476-ADRNLIG (8-830.773.5351). If you or someone you know talks about suicide or feeling hopeless, get help right away. Bipolar disorder Symptoms depend on your mood swings. You may: · Feel very happy, energetic, or on edge. · Feel like you need very little sleep. · Feel overly self-confident. · Do impulsive things, such as spending a lot of money. · Feel sad or hopeless. · Have racing thoughts or trouble thinking and making decisions. · Lose interest in things you have enjoyed in the past. 
· Think about death and suicide. Keep the numbers for these national suicide hotlines: 7-209-219-TALK (4-604.733.7355) and 9-547-KFBWGVY (9-324.466.5552). If you or someone you know talks about suicide or feeling hopeless, get help right away. Seasonal affective disorder (SAD) Symptoms come and go at about the same time each year. For most people with SAD, symptoms come during the winter when there is less daylight. You may: · Feel sad, grumpy, mari, or anxious. · Lose interest in your usual activities. · Eat more and crave carbohydrates, such as bread and pasta. · Gain weight. · Sleep more and feel drowsy during the daytime. How are mood disorders treated? Mood disorders can be treated with medicines or counseling, or a combination of both. Medicines for depression and SAD may include antidepressants. Medicines for bipolar disorder may include: · Mood stabilizers. · Antipsychotics. · Benzodiazepines. Counseling may involve cognitive-behavioral therapy. It teaches you how to change the ways you think and behave. This can help you stop thinking bad thoughts about yourself and your life. Light therapy is the main treatment for SAD. This therapy uses a special kind of lamp. You let the lamp shine on you at certain times, usually in the morning. This may help your symptoms during the months when there is less sunlight. Healthy lifestyle Healthy lifestyle changes may help you feel better. · Get at least 30 minutes of exercise on most days of the week. Walking is a good choice. · Eat a healthy diet. Include fruits, vegetables, lean proteins, and whole grains in your diet each day. · Keep a regular sleep schedule. Try for 8 hours of sleep a night. · Find ways to manage stress, such as relaxation exercises. · Avoid alcohol and illegal drugs. Follow-up care is a key part of your treatment and safety. Be sure to make and go to all appointments, and call your doctor if you are having problems. It's also a good idea to know your test results and keep a list of the medicines you take. Where can you learn more? Go to http://stephen-jose.info/ Enter P645 in the search box to learn more about \"Learning About Mood Disorders. \" Current as of: May 28, 2019Content Version: 12.4 © 8859-7145 [employer]. Care instructions adapted under license by [employer] (which disclaims liability or warranty for this information). If you have questions about a medical condition or this instruction, always ask your healthcare professional. [address] any warranty or liability for your use of this information.

## 2020-03-25 NOTE — PROGRESS NOTES
HISTORY OF PRESENT ILLNESS  Beena Myers is a 30 y.o. female. HPI  Depression with the anxiety and panic states of mind, her psych is off and closed off,      nicley controlled with the current meds,  Patient state that it is getting better: pt states and reports of feeling less anxius, less guilty feeling,  less Hoplessness,  Pt has ns/nh,ni,nh, less trouble with weight gain or loss, in addition stating that she has no tendency of etoh or illicit drug use, having more ability of sleep, more ablitiy  to concentrate at home with the current medications,and all together a safe feeling at home       Current Outpatient Medications   Medication Sig Dispense Refill    norethindrone (CINDY-BE) 0.35 mg tab Take 1 Tab by mouth daily. 1 Dose Pack 11    DULoxetine (CYMBALTA) 30 mg capsule Take 1 Cap by mouth daily. 30 Cap 3    lamoTRIgine (LAMICTAL) 100 mg tablet Take 1 Tab by mouth two (2) times a day. 60 Tab 3    QUEtiapine (SEROQUEL) 25 mg tablet Take 2 Tabs by mouth nightly. Take 1-2 tablets by mouth at bedtime as needed for anxiety/agitation/sleep (Patient taking differently: Take 1-2 tablets by mouth at bedtime as needed for anxiety/agitation/sleep ) 60 Tab 3    clonazePAM (KLONOPIN) 1 mg tablet Take 1 Tab by mouth two (2) times daily as needed for Other (panic). Max Daily Amount: 2 mg. 60 Tab 3    albuterol (PROVENTIL HFA, VENTOLIN HFA, PROAIR HFA) 90 mcg/actuation inhaler Take 1 Puff by inhalation every four (4) hours as needed for Wheezing. 1 Inhaler 3    loratadine (CLARITIN) 10 mg tablet Take 1 Tab by mouth daily as needed for Allergies. 30 Tab 1    norethindrone (CINDY-BE PO) Take  by mouth daily.        Allergies   Allergen Reactions    Codeine Other (comments)     Hallucinations, raise blood pressure     Lortab [Hydrocodone-Acetaminophen] Hives and Itching    Tramadol Nausea Only     Headache     Past Medical History:   Diagnosis Date    Anxiety disorder     Arthritis     Asthma     Bipolar 1 disorder ([facility] [address]     Depression     Frequent headaches     Frequent headaches     Hypertension     Migraine     Muscle pain     Other ill-defined conditions(799.89)     dysplagia    Psychiatric disorder     bipolar, anxiety, depression, PTSD    PTSD (post-traumatic stress disorder)      Past Surgical History:   Procedure Laterality Date    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    HX GYN  3-    birth   Aundria Dadds HX OTHER SURGICAL      Dysplasia     Family History   Problem Relation Age of Onset    Cancer Mother     Diabetes Father     Other Father         Aneursym   Aundria Dadds Migraines Sister      Social History     Tobacco Use    Smoking status: Current Every Day Smoker     Packs/day: 0.50     Years: 14.00     Pack years: 7.00     Types: Cigarettes     Start date: 2006    Smokeless tobacco: Never Used   Substance Use Topics    Alcohol use: Yes     Alcohol/week: 0.0 standard drinks     Comment: social      Lab Results   Component Value Date/Time    WBC 6.3 12/09/2015 12:00 PM    HGB 11.5 12/09/2015 12:00 PM    HCT 34.9 (L) 12/09/2015 12:00 PM    PLATELET 581 46/42/1600 12:00 PM    MCV 88.8 12/09/2015 12:00 PM     Lab Results   Component Value Date/Time    Cholesterol, total 186 09/10/2014 12:22 PM    HDL Cholesterol 40 09/10/2014 12:22 PM    LDL, calculated 115 (H) 09/10/2014 12:22 PM    Triglyceride 154 (H) 09/10/2014 12:22 PM    CHOL/HDL Ratio 3.5 04/21/2009 12:00 PM        Review of Systems   Constitutional: Positive for malaise/fatigue. Negative for chills and fever. HENT: Negative for ear pain and nosebleeds. Eyes: Negative for blurred vision, pain and discharge. Respiratory: Negative for cough, shortness of breath and wheezing. Cardiovascular: Negative for chest pain and leg swelling. Gastrointestinal: Negative for constipation, diarrhea, nausea and vomiting. Genitourinary: Negative for frequency. Musculoskeletal: Negative for joint pain and myalgias.    Skin: Negative for itching and rash. Neurological: Negative for loss of consciousness and headaches. Endo/Heme/Allergies: Does not bruise/bleed easily. Psychiatric/Behavioral: Negative for depression, hallucinations, substance abuse and suicidal ideas. The patient is not nervous/anxious and does not have insomnia. All other systems reviewed and are negative. Physical Exam  Vitals signs and nursing note reviewed. Constitutional:       Appearance: She is well-developed. HENT:      Head: Normocephalic and atraumatic. Eyes:      Conjunctiva/sclera: Conjunctivae normal.      Pupils: Pupils are equal, round, and reactive to light. Neck:      Thyroid: No thyromegaly. Vascular: No JVD. Cardiovascular:      Rate and Rhythm: Normal rate and regular rhythm. Heart sounds: Normal heart sounds. No murmur. No friction rub. No gallop. Pulmonary:      Effort: Pulmonary effort is normal. No respiratory distress. Breath sounds: Normal breath sounds. No stridor. No wheezing or rales. Abdominal:      General: Bowel sounds are normal. There is no distension. Palpations: Abdomen is soft. There is no mass. Tenderness: There is no abdominal tenderness. Musculoskeletal: Normal range of motion. General: No tenderness. Lymphadenopathy:      Cervical: No cervical adenopathy. Skin:     Findings: No erythema or rash. Neurological:      Mental Status: She is alert and oriented to person, place, and time. Cranial Nerves: No cranial nerve deficit. Deep Tendon Reflexes: Reflexes are normal and symmetric. Psychiatric:         Attention and Perception: Attention and perception normal. She is attentive. She does not perceive auditory or visual hallucinations. Mood and Affect: Mood is anxious. Affect is labile and angry. Speech: Speech is rapid and pressured. Behavior: Behavior is hyperactive. Thought Content:  Thought content does not include homicidal or suicidal ideation. Thought content does not include homicidal or suicidal plan. Cognition and Memory: Cognition and memory normal.         Judgment: Judgment normal. Judgment is not impulsive or inappropriate. ASSESSMENT and PLAN  Diagnoses and all orders for this visit:    1. Anxiety and depression  -     DULoxetine (CYMBALTA) 30 mg capsule; Take 1 Cap by mouth daily. -     lamoTRIgine (LaMICtal) 100 mg tablet; Take 1 Tab by mouth two (2) times a day. -     QUEtiapine (SEROquel) 25 mg tablet; Take 1-2 tablets by mouth at bedtime as needed for anxiety/agitation/sleep  -     clonazePAM (KlonoPIN) 1 mg tablet; Take 1 Tab by mouth two (2) times daily as needed. Max Daily Amount: 2 mg.    2. Bipolar 2 disorder, major depressive episode (HCC)  -     DULoxetine (CYMBALTA) 30 mg capsule; Take 1 Cap by mouth daily. -     lamoTRIgine (LaMICtal) 100 mg tablet; Take 1 Tab by mouth two (2) times a day. -     QUEtiapine (SEROquel) 25 mg tablet; Take 1-2 tablets by mouth at bedtime as needed for anxiety/agitation/sleep  -     clonazePAM (KlonoPIN) 1 mg tablet; Take 1 Tab by mouth two (2) times daily as needed. Max Daily Amount: 2 mg.

## 2020-03-25 NOTE — LETTER
Patient: [employer] Patient :  1985 Patient/Provider Agreement for Controlled Drug Prescriptions* The following items numbered 1 to 4 are the same for [facility], [address] [facility], [facility], . [place] , [facility] and [facility]. 1. We wish to have clear, shared goals for your treatment. a. I am being treated with controlled drug for a diagnosis of ____________________________________________________________. 
 
b. The goal of my treatment is to improve my function. 2. We want you to be safe in taking these drugs. a. I should take controlled drugs only as prescribed. I will not change the dose unless directed by my provider. b. I will not give or sell these drugs to anyone. c. I will store these drugs safely so that they cannot be stolen or taken by other people. I know that lost or stolen drugs will not be replaced. d. If I have unused drugs I will dispose of them by dissolving them in water mixed with ray litter, through drug drop off sites (local police) and through drug take back days 
 
e. I will notify my provider if I receive controlled drugs from other providers. f. I will not use controlled drugs which are not prescribed for me and I will not use illegal drugs. g. I will use only one pharmacy for controlled drugs. I will notify you if that pharmacy changes. h. I agree that my provider may share information about my use of controlled drugs with my pharmacy, with emergency rooms and with other providers involved in my care. i. I agree to keep my appointments and to notify my provider and reschedule when I must miss one. 
j. I agree to notify my provider if my health changes in an important way or if I become pregnant. 
 
k.  I will sign releases so that my provider can share information with all other providers involved in my care. 3. We must monitor these medicines carefully. a. I agree to be called randomly for pill counts, and to present for a pill count on the same day on which I am contacted. b.    I agree to be called randomly for urine drug tests, and to present for a urine drug test on the same day on which Im contacted. c. I understand that if I cannot be reached for pill counts or urine drug tests my drugs may be stopped. d. I understand that if I lose my drugs or they are stolen my drugs may be stopped. e. I know that my provider and/or a team member will regularly look at the Prescription Monitoring Program to review all of my prescriptions for controlled drugs Regina Mayo 4. We want to manage these prescriptions in an orderly manner. a. I know prescriptions will be for 28 days at most, with no refills. b. I know I can only request refills on weekdays, during regular office hours, consistent with office policy. c. I know that I may not request early refills. I know that I must meet all of the requirements of this agreement or my controlled drugs may be stopped. Patient Signature      Date Provider Signature      Date *These standards were developed through a collaborative effort of clinical leaders from [facility], [facility], [address] and Counseling Services Trace Giron [address] [place] [address], and [address] [place] [address] under the auspices of the [facility]  [facility]. Adopted on 2015 Patient: [employer] Patient : 1985 Informed Consent for Benzodiazepines for Anxiety Disorders Uses I understand that benzodiazepines (drugs likelorazepam or Ativan, diazepam or Valium, clonazepam or Klonopin, alprazolam or Xanax) are sometimes used to lowerhigh levels of anxiety. Some people report improved levels of anxiety when they take these drugs, but I understand that these drugs may not help people with anxiety when taken for more than 12 weeks. There are no studies that have answered this question. Benefits Expected · Improved function · Lower levels of anxiety Alternatives I understand that benzodiazepines are not the first choice of treatment for anxiety disorders. There are safer drugs that work better called selective serotonin reuptake inhibitors (SSRIs) and serotonin norepinephrine reuptake inhibitors (SNRIs), as well as cognitive behavioral therapy (counseling that teaches skills to manage anxiety). Risks It has been explained to me and I understand that the use of benzodiazepines can cause: · Slowed thinking and reaction times, poor focus, confusion, and memory loss · Less control of my emotions and actions · Depression · Weakness · Falls and fractures (broken bones), especially in the elderly · Car accidents · Breathing problems (particularly with lung disease such as Asthma, COPD, or Sleep Apnea) · Excessive sedation (sleepiness) · Intoxication · Tolerance, withdrawal sickness · Dependence (of the body and mind), substance abuse, and addiction · Increased risk of all side effects when used with alcohol · Accidental overdose, especially when taken with certain pain drugs known as opioids · Higher doses of these drugs cause even greater risks · These drugs may increase my risk of being the victim of a crime · These drugs create a greater risk if I have a history of addiction (especially to opioids and alcohol) I also understand: · That using alcohol, along with benzodiazepines, is dangerous and that I must not use alcohol while I am taking benzodiazepine drugs.   
· That using marijuana, along with benzodiazepines, is dangerous as both may delay my reactions and increase my risk of accident. · That using opioids,along with benzodiazepines, is dangerous. I should avoid using opioids while I am taking benzodiazepine drugs. · That all of these risks are especially high in those age 72 and older. My provider and I have tried other more effective and safer treatments such as Cognitive Behavioral Therapy (counseling), SSRIs, SNRIs, and exercise. I understand the risks described here and I know that by taking benzodiazepine drugs I accept all of these risks. I will continue to work with my providers to explore other safer options to manage my excessive anxiety. Patient Signature: ____________________________________  Date: ___/___/______ Provider Signature: ___________________________________  Date:___/___/______

## 2020-03-25 NOTE — LETTER
Name:Jennifer Chaparro [employer] RBN:[birth year] MR #:[id number] Provider Dale Rainey MD  
*AJPR-605* BSMG-491 (5/16) Page 1 of 5 Initial [employer] CONTROLLED SUBSTANCE AGREEMENT I may be prescribed medications that are controlled substances as part  of my treatment plan for management of my medical condition(s). The goal of my treatment plan is to maintain and/or improve my health and wellbeing. Because controlled substances have an increased risk of abuse or harm, continual re-evaluation is needed determine if the goals of my treatment plan are being met for my safety and the safety of others. Woo Das  am entering into this Controlled Substance Agreement with my provider, Nickolas Clemente MD at [address] [place] [address] . I understand that successful treatment requires mutual trust and honesty between me and my provider. I understand that there are state and federal laws and regulations which apply to the medications that my provider may prescribe that must be followed. I understand there are risks and benefits ts of taking the medicines that my provider may prescribe. I understand and agree that following this Agreement is necessary in continuing my provider-patient relationship and success of my treatment plan. As a part of my treatment plan, I agree to the following: COMMUNICATION: 
 
1. I will communicate fully with my provider about my medical condition(s), including the effect on my daily life and how well my medications are helping. I will tell my provider all of the medications that I take for any reason, including medications I receive from another health care provider, and will notify my provider about all issues, problems or concerns, including any side effects, which may be related to my medications. I understand that this information allows my provider to adjust my treatment plan to help manage my medical condition. I understand that this information will become part of my permanent medical record. 2. I will notify my provider if I have a history of alcohol/drug misuse/addiction or if I have had treatment for alcohol/drug addiction in the past, or if I have a new problem with or concern about alcohol/drug use/addiction, because this increases the likelihood of high risk behaviors and may lead to serious medical conditions. 3. Females Only: I will notify my provider if I am or become pregnant, or if I intend to become pregnant, or if I intend to breastfeed. I understand that communication of these issues with my provider is important, due to possible effects my medication could have on an unborn fetus or breastfeeding child. Name:Jennifer Rivera CVP:[id number] MR #:[id number] Provider Shannan Norwood MD  
*XCPG-991* BSMG-491 (5/16) Page 2 of 5 Initial [employer] MISUSE OF MEDICATIONS / DRUGS: 
 
1. I agree to take all controlled substances as prescribed, and will not misuse or abuse any controlled substances prescribed by my provider. For my safety, I will not increase the amount of medicine I take without first talking with and getting permission from my provider. 2. If I have a medical emergency, another health care provider may prescribe me medication. If I seek emergency treatment, I will notify my provider within seventy-two (72) hours. 3. I understand that my provider may discuss my use and/or possible misuse/abuse of controlled substances and alcohol, as appropriate, with any health care provider involved in my care, pharmacist or legal authority. ILLEGAL DRUGS: 
 
1. I will not use illegal drugs of any kind, including but not limited to marijuana, heroin, cocaine, or any prescription drug which is not prescribed to me.  
 
DRUG DIVERSION / PRESCRIPTION FRAUD: 
 
 1. I will not share, sell, trade, give away, or otherwise misuse my prescriptions or medications. 2. I will not alter any prescriptions provided to me by my provider. SINGLE PROVIDER: 
 
1. I agree that all controlled substances that I take will be prescribed only by my provider (or his/her covering provider) under this Agreement. This agreement does not prevent me from seeking emergency medical treatment or receiving pain management related to a surgery. PROTECTING MEDICATIONS: 
 
1. I am responsible for keeping my prescriptions and medications in a safe and secure place including safeguarding them from loss or theft. I understand that lost, stolen or damaged/destroyed prescriptions or medications will not be replaced. Name:.Beverly Chaparro [employer] LIY:[birth year] MR #:[id number] Provider Leticia Sanchez MD  
*TRTC-916* BSMG-491 (5/16) Page 3 of 5 Initial [employer] PRESCRIPTION RENEWALS/REFILLS: 
 
 
1. I authorize my provider and my pharmacy to cooperate fully with any local, state, or federal law enforcement agency in the investigation of any possible misuse, sale, or other diversion of my controlled substance prescriptions or medications. RISKS: 
 
 
1. I understand that if I do not adhere to this Agreement in any way, my provider may change my prescriptions, stop prescribing controlled substances or end our provider-patient relationship. 2. If my provider decides to stop prescribing medication, or decides to end our provider-patient relationship,my provider may require that I taper my medications slowly. If necessary, my provider may also provide a prescription for other medications to treat my withdrawal symptoms. UNDERSTANDING THIS AGREEMENT: 
 
I understand that my provider may adjust or stop my prescriptions for controlled substances based on my medical condition and my treatment plan. I understand that this Agreement does not guarantee that I will be prescribed medications or controlled substances. I understand that controlled substances may be just one part 
of my treatment plan. My initial on each page and my signature below shows that I have read each page of this Agreement, I have had an opportunity to ask questions, and all of my questions have been answered to my satisfaction by my provider. By signing below, I agree to comply with this Agreement, and I understand that if I do not follow the Agreements listed above, my provider may stop 
 
 
 
_________________________________________  Date/Time 3/25/2020 11:38 AM   
             (Patient Signature)

## 2020-04-01 NOTE — TELEPHONE ENCOUNTER
----- Message from Adrienne Paz sent at 4/1/2020  3:12 PM EDT -----  Regarding: DR Gordon Abarca / REFILL  General Message/Vendor Calls      Pt is requesting a refill on Claritin to be called into the [facility] listed in chart.    Callback required     Best contact number(s):[phone]

## 2020-04-02 RX ORDER — LORATADINE 10 MG/1
10 TABLET ORAL
Qty: 30 TAB | Refills: 1 | Status: SHIPPED | OUTPATIENT
Start: 2020-04-02 | End: 2020-05-29

## 2020-05-29 RX ORDER — LORATADINE 10 MG/1
TABLET ORAL
Qty: 30 TAB | Refills: 1 | Status: SHIPPED | OUTPATIENT
Start: 2020-05-29 | End: 2020-06-25 | Stop reason: SDUPTHER

## 2020-06-25 ENCOUNTER — VIRTUAL VISIT (OUTPATIENT)
Dept: FAMILY MEDICINE CLINIC | Age: 35
End: 2020-06-25

## 2020-06-25 DIAGNOSIS — F32.A ANXIETY AND DEPRESSION: ICD-10-CM

## 2020-06-25 DIAGNOSIS — F41.9 ANXIETY AND DEPRESSION: ICD-10-CM

## 2020-06-25 DIAGNOSIS — J45.909 ASTHMA: ICD-10-CM

## 2020-06-25 DIAGNOSIS — F31.81 BIPOLAR 2 DISORDER, MAJOR DEPRESSIVE EPISODE (HCC): ICD-10-CM

## 2020-06-25 RX ORDER — LORATADINE 10 MG/1
TABLET ORAL
Qty: 30 TAB | Refills: 1 | Status: SHIPPED | OUTPATIENT
Start: 2020-06-25 | End: 2020-11-27 | Stop reason: SDUPTHER

## 2020-06-25 RX ORDER — DULOXETIN HYDROCHLORIDE 30 MG/1
30 CAPSULE, DELAYED RELEASE ORAL DAILY
Qty: 30 CAP | Refills: 3 | Status: SHIPPED | OUTPATIENT
Start: 2020-06-25 | End: 2020-08-31 | Stop reason: SDUPTHER

## 2020-06-25 RX ORDER — CLONAZEPAM 1 MG/1
1 TABLET ORAL
Qty: 60 TAB | Refills: 2 | Status: SHIPPED | OUTPATIENT
Start: 2020-06-25 | End: 2020-08-31 | Stop reason: SDUPTHER

## 2020-06-25 RX ORDER — QUETIAPINE FUMARATE 25 MG/1
TABLET, FILM COATED ORAL
Qty: 60 TAB | Refills: 3 | Status: SHIPPED | OUTPATIENT
Start: 2020-06-25 | End: 2020-08-31 | Stop reason: SDUPTHER

## 2020-06-25 RX ORDER — ALBUTEROL SULFATE 90 UG/1
1 AEROSOL, METERED RESPIRATORY (INHALATION)
Qty: 1 INHALER | Refills: 3 | Status: SHIPPED | OUTPATIENT
Start: 2020-06-25 | End: 2021-03-02 | Stop reason: SDUPTHER

## 2020-06-25 RX ORDER — LAMOTRIGINE 100 MG/1
100 TABLET ORAL 2 TIMES DAILY
Qty: 60 TAB | Refills: 3 | Status: SHIPPED | OUTPATIENT
Start: 2020-06-25 | End: 2020-08-31 | Stop reason: SDUPTHER

## 2020-06-25 NOTE — PATIENT INSTRUCTIONS

## 2020-06-25 NOTE — PROGRESS NOTES
Chief Complaint   Patient presents with    Anxiety     f/u     1. Have you been to the ER, urgent care clinic since your last visit? Hospitalized since your last visit? No    2. Have you seen or consulted any other health care providers outside of the [address] [place] [address] since your last visit? Include any pap smears or colon screening. No      Call placed to pt. Verified patient with two type of identifiers.

## 2020-06-25 NOTE — PROGRESS NOTES
HISTORY OF PRESENT ILLNESS  Efe Snyder is a 30 y.o. female. HPI    Today's Pt main concerns were provided on virtual visit and Video telemed format,  Present on VV for the concern of the current medical conditions,  pt is w/ comorbid history and unaware if the pt has been exposed to covid-19 individual,   Pt Have been staying at home for couple of wks,  pt has no fever no cough no dyspnea, no ha, not dizzy, nl smell nl taste, no N/V/D, no body ache,       Pt present stating that before all the current meds she would go through some period of unknown abnormally and many irritable moods that were consistent, and sometimes the pt was having a persistent increased in activity or energy, for which sometimes would last for days, and then sometimes feels like the pt would not be able to do anything and could not even come out of the house and the pt felt very depressed and sad , in addition pt would have  been very close to Er visits and few hospitalization at [facility] the clinic, stating that during the period of mood disturbance, pt has increased energy , or likes to be active at all time, had a sense of inflated self-esteem or grandiosity, and the pt had a Decreased need for sleep and then states that the pt felt rested after only couple hours of sleep, some times just wants to talk a lot, had wondering ideas , the thoughts were racing all over, and would have easily lost attention to some unimportant stimuli, As per the pt, all the mood disturbances were sufficiently severe to cause marked impairment in social or occupational functioning, Pt states that all were started at an early age and are not attributable to the physiological effects of a drug abuse, a medication, other medical conditions.   Today stating that things are much better with the current meds, and is willing to cont on despite their side effects, no cigs no illicit drugs and no Etoh abuse currently juts taking the meds for the serious medical conditions and currently Having more good days than bad days, having 2 kids and a suppotive ,less anxious less depressed , better sleep, and a better life style,       Current Outpatient Medications   Medication Sig Dispense Refill    loratadine (CLARITIN) 10 mg tablet TAKE 1 TABLET BY MOUTH DAILY AS NEEDED FOR ALLERGIES. 30 Tab 1    DULoxetine (CYMBALTA) 30 mg capsule Take 1 Cap by mouth daily. 30 Cap 3    lamoTRIgine (LaMICtal) 100 mg tablet Take 1 Tab by mouth two (2) times a day. 60 Tab 3    QUEtiapine (SEROquel) 25 mg tablet Take 1-2 tablets by mouth at bedtime as needed for anxiety/agitation/sleep 60 Tab 3    clonazePAM (KlonoPIN) 1 mg tablet Take 1 Tab by mouth two (2) times daily as needed for Anxiety or Sleep. Max Daily Amount: 2 mg. 60 Tab 3    norethindrone (CINDY-BE) 0.35 mg tab Take 1 Tab by mouth daily. 1 Dose Pack 11    albuterol (PROVENTIL HFA, VENTOLIN HFA, PROAIR HFA) 90 mcg/actuation inhaler Take 1 Puff by inhalation every four (4) hours as needed for Wheezing. 1 Inhaler 3     Allergies   Allergen Reactions    Codeine Other (comments)     Hallucinations, raise blood pressure     Lortab [Hydrocodone-Acetaminophen] Hives and Itching    Tramadol Nausea Only     Headache     Past Medical History:   Diagnosis Date    Anxiety disorder     Arthritis     Asthma     Bipolar 1 disorder (HCC)     Depression     Frequent headaches     Frequent headaches     Hypertension     Migraine     Muscle pain     Other ill-defined conditions(859.89)     dysplagia    Psychiatric disorder     bipolar, anxiety, depression, PTSD    PTSD (post-traumatic stress disorder)      Past Surgical History:   Procedure Laterality Date    HX AMPUTATION Left     left hand middle finger.  8/2014    HX GYN  3-    birth   [place] HX OTHER SURGICAL      Dysplasia     Family History   Problem Relation Age of Onset   [place] Cancer Mother     Diabetes Father     Other Father         Aneursym   [place] Migraines Sister Social History     Tobacco Use    Smoking status: Current Every Day Smoker     Packs/day: 0.50     Years: 14.00     Pack years: 7.00     Types: Cigarettes     Start date: 2006    Smokeless tobacco: Never Used   Substance Use Topics    Alcohol use: Yes     Alcohol/week: 0.0 standard drinks     Comment: social      Lab Results   Component Value Date/Time    WBC 6.3 12/09/2015 12:00 PM    HGB 11.5 12/09/2015 12:00 PM    HCT 34.9 (L) 12/09/2015 12:00 PM    PLATELET 850 76/52/0852 12:00 PM    MCV 88.8 12/09/2015 12:00 PM     No results found for: TSH, TSH2, TSH3, TSHP, TSHELE, TSHEXT, TT3, T3U, T3UP, FRT3, FT3, FT4, FT4P, T4, T4P, FT4T, TT7, TSHEXT      Review of Systems   Constitutional: Positive for malaise/fatigue. Negative for chills and fever. HENT: Negative for ear pain and nosebleeds. Eyes: Negative for blurred vision, pain and discharge. Respiratory: Negative for shortness of breath. Cardiovascular: Negative for chest pain and leg swelling. Gastrointestinal: Negative for constipation, diarrhea, nausea and vomiting. Genitourinary: Negative for frequency. Musculoskeletal: Positive for myalgias. Negative for joint pain. Skin: Negative for itching and rash. Neurological: Negative for headaches. Psychiatric/Behavioral: Positive for depression. Negative for hallucinations, substance abuse and suicidal ideas. The patient is nervous/anxious and has insomnia. Physical Exam  Constitutional:       Appearance: She is obese. She is not ill-appearing or toxic-appearing. HENT:      Head: Normocephalic and atraumatic. Mouth/Throat:      Mouth: Mucous membranes are moist.   Neurological:      Mental Status: She is alert and oriented to person, place, and time. Psychiatric:         Attention and Perception: She is inattentive. She does not perceive auditory or visual hallucinations. Mood and Affect: Mood normal. Mood is not anxious, depressed or elated. Affect is flat.  Affect is not labile, blunt, angry, tearful or inappropriate. Speech: She is communicative. Speech is rapid and pressured. Speech is not delayed, slurred or tangential.         Behavior: Behavior is slowed. Behavior is not agitated, aggressive, withdrawn, hyperactive or combative. Thought Content: Thought content normal. Thought content is not paranoid. Thought content does not include homicidal or suicidal ideation. Cognition and Memory: Memory is not impaired. She does not exhibit impaired recent memory or impaired remote memory. Judgment: Judgment normal. Judgment is not inappropriate. ASSESSMENT and PLAN  Diagnoses and all orders for this visit:    1. Asthma  -     albuterol (PROVENTIL HFA, VENTOLIN HFA, PROAIR HFA) 90 mcg/actuation inhaler; Take 1 Puff by inhalation every four (4) hours as needed for Wheezing. 2. Anxiety and depression  -     clonazePAM (KlonoPIN) 1 mg tablet; Take 1 Tab by mouth two (2) times daily as needed for Anxiety or Sleep. Max Daily Amount: 2 mg.  -     DULoxetine (CYMBALTA) 30 mg capsule; Take 1 Cap by mouth daily. -     lamoTRIgine (LaMICtal) 100 mg tablet; Take 1 Tab by mouth two (2) times a day. -     QUEtiapine (SEROquel) 25 mg tablet; Take 1-2 tablets by mouth at bedtime as needed for anxiety/agitation/sleep    3. Bipolar 2 disorder, major depressive episode (HCC)  -     clonazePAM (KlonoPIN) 1 mg tablet; Take 1 Tab by mouth two (2) times daily as needed for Anxiety or Sleep. Max Daily Amount: 2 mg.  -     DULoxetine (CYMBALTA) 30 mg capsule; Take 1 Cap by mouth daily. -     lamoTRIgine (LaMICtal) 100 mg tablet; Take 1 Tab by mouth two (2) times a day. -     QUEtiapine (SEROquel) 25 mg tablet; Take 1-2 tablets by mouth at bedtime as needed for anxiety/agitation/sleep  -     REFERRAL TO PSYCHIATRY    Other orders  -     loratadine (CLARITIN) 10 mg tablet; TAKE 1 TABLET BY MOUTH DAILY AS NEEDED FOR ALLERGIES.       Depression with anxiety in a stable condition, not suicidal, start antianxiety and calming medication for now will reevaluate in 3 m for progression   in addition Counseling , social support, spiritual belonging, inc physical activity, all offered,  compliance advised, patient was told that should not mix any of current medication with any other illicit drugs, should not drink any alcoholic beverages while on such medication patient was also told to not operate any machinery while under the influence patient acknowledged understanding and agreed with today's recommendation in addition patient was told to call for any concern       Concern abdout COVID-19 addressed and detailed, pt was told that the best way to prevent illness is by protection, to Wear a facemask , having social distance, and to get tested if possible, Pursuant to the emergency declaration under the [address] and [facility], 113 waiver authority and the [employer] and [employer] Act, this Virtual Visit was conducted, with patient's consent, to reduce the patient's risk of exposure to COVID-19 and provide continuity of care for an established patient. Pt was also told if develop dyspnea needs to call 911 or go to er, call for prem advise, pt agreed with todays recommendations, Services were provided through a Video synchronous discussion virtually to substitute for in-person appointment.

## 2020-08-31 ENCOUNTER — VIRTUAL VISIT (OUTPATIENT)
Dept: FAMILY MEDICINE CLINIC | Age: 35
End: 2020-08-31

## 2020-08-31 DIAGNOSIS — Z3A.28 28 WEEKS GESTATION OF PREGNANCY: ICD-10-CM

## 2020-08-31 DIAGNOSIS — O13.3 GESTATIONAL HTN, THIRD TRIMESTER: ICD-10-CM

## 2020-08-31 DIAGNOSIS — Z71.89 ADVICE GIVEN ABOUT COVID-19 VIRUS INFECTION: ICD-10-CM

## 2020-08-31 DIAGNOSIS — F41.9 ANXIETY AND DEPRESSION: ICD-10-CM

## 2020-08-31 DIAGNOSIS — F31.81 BIPOLAR 2 DISORDER, MAJOR DEPRESSIVE EPISODE (HCC): Primary | ICD-10-CM

## 2020-08-31 DIAGNOSIS — F32.A ANXIETY AND DEPRESSION: ICD-10-CM

## 2020-08-31 PROCEDURE — 99214 OFFICE O/P EST MOD 30 MIN: CPT | Performed by: FAMILY MEDICINE

## 2020-08-31 RX ORDER — QUETIAPINE FUMARATE 25 MG/1
TABLET, FILM COATED ORAL
Qty: 60 TAB | Refills: 3 | Status: SHIPPED | OUTPATIENT
Start: 2020-09-25 | End: 2020-12-10 | Stop reason: SDUPTHER

## 2020-08-31 RX ORDER — LAMOTRIGINE 100 MG/1
100 TABLET ORAL 2 TIMES DAILY
Qty: 60 TAB | Refills: 3 | Status: SHIPPED | OUTPATIENT
Start: 2020-10-25 | End: 2020-12-10 | Stop reason: SDUPTHER

## 2020-08-31 RX ORDER — DULOXETIN HYDROCHLORIDE 30 MG/1
30 CAPSULE, DELAYED RELEASE ORAL DAILY
Qty: 30 CAP | Refills: 3 | Status: SHIPPED | OUTPATIENT
Start: 2020-09-25 | End: 2020-12-10 | Stop reason: SDUPTHER

## 2020-08-31 RX ORDER — CLONAZEPAM 1 MG/1
1 TABLET ORAL
Qty: 60 TAB | Refills: 2 | Status: SHIPPED | OUTPATIENT
Start: 2020-09-25 | End: 2020-12-10 | Stop reason: SDUPTHER

## 2020-08-31 NOTE — PROGRESS NOTES
HISTORY OF PRESENT ILLNESS  Stacey Contreras is a 30 y.o. female. HPI     Today's Pt main concerns were provided on virtual visit and Video telemed format,  Pt Have been staying at home for couple of wks,  pt has no fever no cough no dyspnea, no ha, not dizzy, nl smell nl taste, no N/V/D, no body ache. , Pregnant 28-5/7, at due at ,  No bleeding no trouble with urination, no abd pain, does drink a lot of water, feels the kicking, stress is nicely controlled with current meds, having good family support just feeling tired, +leg swelling sees the Ob/gyn once monthly, with nl bp, on no bp meds, compliant with prenatal meds  was told to take all her meds as per the OB/gyn despite their side effects was told to benefit more by taking her meds,   Depression with the anxiety and panic states of mind    nicley controlled with the current meds,  Patient state that things are better: pt states and reports of feeling less anxius, less guilty feeling,  less Hoplessness,ns/nh,ni,nh, no tendency of etoh or illicit drug use, and all together a safe feeling at home       Current Outpatient Medications   Medication Sig Dispense Refill    albuterol (PROVENTIL HFA, VENTOLIN HFA, PROAIR HFA) 90 mcg/actuation inhaler Take 1 Puff by inhalation every four (4) hours as needed for Wheezing. 1 Inhaler 3    clonazePAM (KlonoPIN) 1 mg tablet Take 1 Tab by mouth two (2) times daily as needed for Anxiety or Sleep. Max Daily Amount: 2 mg. 60 Tab 2    DULoxetine (CYMBALTA) 30 mg capsule Take 1 Cap by mouth daily. 30 Cap 3    lamoTRIgine (LaMICtal) 100 mg tablet Take 1 Tab by mouth two (2) times a day. 60 Tab 3    loratadine (CLARITIN) 10 mg tablet TAKE 1 TABLET BY MOUTH DAILY AS NEEDED FOR ALLERGIES. 30 Tab 1    QUEtiapine (SEROquel) 25 mg tablet Take 1-2 tablets by mouth at bedtime as needed for anxiety/agitation/sleep 60 Tab 3    norethindrone (CINDY-BE) 0.35 mg tab Take 1 Tab by mouth daily.  1 Dose Pack 11     Allergies Allergen Reactions    Codeine Other (comments)     Hallucinations, raise blood pressure     Lortab [Hydrocodone-Acetaminophen] Hives and Itching    Tramadol Nausea Only     Headache     Past Medical History:   Diagnosis Date    Anxiety disorder     Arthritis     Asthma     Bipolar 1 disorder (HCC)     Depression     Frequent headaches     Frequent headaches     Hypertension     Migraine     Muscle pain     Other ill-defined conditions(799.89)     dysplagia    Psychiatric disorder     bipolar, anxiety, depression, PTSD    PTSD (post-traumatic stress disorder)      Past Surgical History:   Procedure Laterality Date    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    HX GYN  3-    birth   [facility] HX OTHER SURGICAL      Dysplasia     Family History   Problem Relation Age of Onset    Cancer Mother     Diabetes Father     Other Father         Aneursym   [facility] Migraines Sister      Social History     Tobacco Use    Smoking status: Current Every Day Smoker     Packs/day: 0.50     Years: 14.00     Pack years: 7.00     Types: Cigarettes     Start date: 2006    Smokeless tobacco: Never Used   Substance Use Topics    Alcohol use: Yes     Alcohol/week: 0.0 standard drinks     Comment: social      Lab Results   Component Value Date/Time    WBC 6.3 12/09/2015 12:00 PM    HGB 11.5 12/09/2015 12:00 PM    HCT 34.9 (L) 12/09/2015 12:00 PM    PLATELET 877 27/91/9795 12:00 PM    MCV 88.8 12/09/2015 12:00 PM     Lab Results   Component Value Date/Time    GFR est non-AA >60 12/09/2015 12:00 PM    GFR est AA >60 12/09/2015 12:00 PM    Creatinine 0.95 12/09/2015 12:00 PM    BUN 9 12/09/2015 12:00 PM    Sodium 139 12/09/2015 12:00 PM    Potassium 3.9 12/09/2015 12:00 PM    Chloride 105 12/09/2015 12:00 PM    CO2 26 12/09/2015 12:00 PM        Review of Systems   Constitutional: Negative for chills and fever. HENT: Negative for congestion and nosebleeds. Eyes: Negative for blurred vision and pain. Respiratory: Negative for cough, shortness of breath and wheezing. Cardiovascular: Negative for chest pain and leg swelling. Gastrointestinal: Negative for constipation, diarrhea, nausea and vomiting. Genitourinary: Negative for dysuria and frequency. Musculoskeletal: Negative for joint pain and myalgias. Skin: Negative for itching and rash. Neurological: Negative for dizziness, loss of consciousness and headaches. Psychiatric/Behavioral: Negative for depression. The patient is not nervous/anxious and does not have insomnia. Physical Exam  Constitutional:       Appearance: She is obese. She is not ill-appearing or toxic-appearing. HENT:      Head: Normocephalic and atraumatic. Mouth/Throat:      Mouth: Mucous membranes are moist.   Neurological:      Mental Status: She is alert and oriented to person, place, and time. Psychiatric:         Mood and Affect: Mood normal.         Behavior: Behavior normal.         Thought Content: Thought content normal.         Judgment: Judgment normal.         ASSESSMENT and PLAN  Diagnoses and all orders for this visit:    1. Bipolar 2 disorder, major depressive episode (HCC)  -     clonazePAM (KlonoPIN) 1 mg tablet; Take 1 Tab by mouth two (2) times daily as needed for Anxiety or Sleep. Max Daily Amount: 2 mg.  -     DULoxetine (CYMBALTA) 30 mg capsule; Take 1 Cap by mouth daily. -     lamoTRIgine (LaMICtal) 100 mg tablet; Take 1 Tab by mouth two (2) times a day. -     QUEtiapine (SEROquel) 25 mg tablet; Take 1-2 tablets by mouth at bedtime as needed for anxiety/agitation/sleep    2. Anxiety and depression  -     clonazePAM (KlonoPIN) 1 mg tablet; Take 1 Tab by mouth two (2) times daily as needed for Anxiety or Sleep. Max Daily Amount: 2 mg.  -     DULoxetine (CYMBALTA) 30 mg capsule; Take 1 Cap by mouth daily. -     lamoTRIgine (LaMICtal) 100 mg tablet; Take 1 Tab by mouth two (2) times a day. -     QUEtiapine (SEROquel) 25 mg tablet;  Take 1-2 tablets by mouth at bedtime as needed for anxiety/agitation/sleep    3. Gestational HTN, third trimester    4. 28 weeks gestation of pregnancy    5. Advice given about COVID-19 virus infection    Patient was told to follow-up with OB/GYN biweekly in the third trimester increase oral hydration 8 to 10 glasses of water increase physical activity leg elevation head of the bed to 30 degrees, increase fiber to prevent constipation continue antidepressive bipolar medication secondary to more benefit than harm if she stops the medications patient was told to call for any trouble with urination bleeding abdominal pain worsening lower extremity leg swelling patient acknowledged understanding and agreed with today's recommendation    Patient advised to have the mask on most of the time, social distance and handwashing avoid crowded area pursuant to the emergency declaration under the [address] and [address] authority and the [employer] and [employer] Act, this Virtual Visit was conducted, with patient's consent, to reduce the patient's risk of exposure to COVID-19 and provide continuity of care for an established patient  Services were provided through a Video synchronous discussion virtually to substitute for in-person appointment.

## 2020-08-31 NOTE — PROGRESS NOTES
Chief Complaint   Patient presents with    Medication Refill     1. Have you been to the ER, urgent care clinic since your last visit? Hospitalized since your last visit? No    2. Have you seen or consulted any other health care providers outside of the [address] [place] [address] since your last visit? Include any pap smears or colon screening. Yes When: 08/2020 Where: ob Reason for visit: routine     Call placed to pt. Verified patient with two type of identifiers.

## 2020-11-27 ENCOUNTER — TELEPHONE (OUTPATIENT)
Dept: FAMILY MEDICINE CLINIC | Age: 35
End: 2020-11-27

## 2020-11-27 RX ORDER — LORATADINE 10 MG/1
TABLET ORAL
Qty: 30 TAB | Refills: 1 | Status: SHIPPED | OUTPATIENT
Start: 2020-11-27 | End: 2021-03-02 | Stop reason: SDUPTHER

## 2020-11-27 NOTE — TELEPHONE ENCOUNTER
Patient would like for Churdan Eugene to know that her OBGYN doctor put her on Labetalol hcl 100 mg tab she can be reached @ [phone]

## 2020-12-10 ENCOUNTER — OFFICE VISIT (OUTPATIENT)
Dept: FAMILY MEDICINE CLINIC | Age: 35
End: 2020-12-10
Payer: MEDICAID

## 2020-12-10 VITALS
RESPIRATION RATE: 16 BRPM | WEIGHT: 181.3 LBS | BODY MASS INDEX: 32.12 KG/M2 | DIASTOLIC BLOOD PRESSURE: 126 MMHG | HEART RATE: 75 BPM | SYSTOLIC BLOOD PRESSURE: 158 MMHG | OXYGEN SATURATION: 99 % | TEMPERATURE: 97.7 F | HEIGHT: 63 IN

## 2020-12-10 DIAGNOSIS — F32.A ANXIETY AND DEPRESSION: ICD-10-CM

## 2020-12-10 DIAGNOSIS — F41.9 ANXIETY AND DEPRESSION: ICD-10-CM

## 2020-12-10 DIAGNOSIS — F31.81 BIPOLAR 2 DISORDER, MAJOR DEPRESSIVE EPISODE (HCC): ICD-10-CM

## 2020-12-10 DIAGNOSIS — O13.3 GESTATIONAL HYPERTENSION, THIRD TRIMESTER: Primary | ICD-10-CM

## 2020-12-10 DIAGNOSIS — Z71.89 ADVICE GIVEN ABOUT COVID-19 VIRUS INFECTION: ICD-10-CM

## 2020-12-10 PROCEDURE — 99214 OFFICE O/P EST MOD 30 MIN: CPT | Performed by: FAMILY MEDICINE

## 2020-12-10 RX ORDER — LABETALOL 300 MG/1
TABLET, FILM COATED ORAL
Qty: 90 TAB | Refills: 2 | Status: SHIPPED | OUTPATIENT
Start: 2020-12-10 | End: 2021-03-02 | Stop reason: SDUPTHER

## 2020-12-10 RX ORDER — LABETALOL 200 MG/1
TABLET, FILM COATED ORAL
COMMUNITY
Start: 2020-12-02 | End: 2020-12-10 | Stop reason: SDUPTHER

## 2020-12-10 RX ORDER — CLONAZEPAM 1 MG/1
1 TABLET ORAL
Qty: 60 TAB | Refills: 2 | Status: SHIPPED | OUTPATIENT
Start: 2020-12-10 | End: 2021-03-02 | Stop reason: SDUPTHER

## 2020-12-10 RX ORDER — LAMOTRIGINE 100 MG/1
100 TABLET ORAL 2 TIMES DAILY
Qty: 60 TAB | Refills: 3 | Status: SHIPPED | OUTPATIENT
Start: 2020-12-10 | End: 2021-03-02 | Stop reason: SDUPTHER

## 2020-12-10 RX ORDER — QUETIAPINE FUMARATE 25 MG/1
TABLET, FILM COATED ORAL
Qty: 60 TAB | Refills: 3 | Status: SHIPPED | OUTPATIENT
Start: 2020-12-10 | End: 2021-03-02 | Stop reason: SDUPTHER

## 2020-12-10 RX ORDER — DULOXETIN HYDROCHLORIDE 30 MG/1
30 CAPSULE, DELAYED RELEASE ORAL DAILY
Qty: 30 CAP | Refills: 3 | Status: SHIPPED | OUTPATIENT
Start: 2020-12-10 | End: 2021-03-02 | Stop reason: SDUPTHER

## 2020-12-10 NOTE — PROGRESS NOTES
HISTORY OF PRESENT ILLNESS  Ashley Sharma is a 30 y.o. female. HTN  Today pt present for Bp check and++ Compliancy w/ the bp meds, having had the low salt diet ,  has been active, patien does not obtain the bp at home ,today the pt denies Chest Pain, has no legs swelling no lightheadedness,    Patient present with depression, the anxiety, tired lack of sleep and panicky states of mind, stating that clonazepam has helped the current serious medical condition and the tremor, she needs refill, tried to come off but became unstable take it as needed, has only 2 tabs left at this time, aware of its side effects and dependency,     Patient's current medical condition is not controlled without medications,   Patient state that it isnot  getting better: pt states and reports of feeling less anxius, less guilty feeling,  less Hoplessness, ns/nh,ni,nh, less trouble with weight gain or loss, no tendency of etoh or illicit drug use, more ability of sleep, more ablitiy  to concentrate at home with the current medications,and all together a safe feeling at home           Current Outpatient Medications   Medication Sig Dispense Refill    labetaloL (NORMODYNE) 200 mg tablet TAKE 1 TABLET BY MOUTH THREE TIMES A DAY      loratadine (CLARITIN) 10 mg tablet TAKE 1 TABLET BY MOUTH DAILY AS NEEDED FOR ALLERGIES. 30 Tab 1    clonazePAM (KlonoPIN) 1 mg tablet Take 1 Tab by mouth two (2) times daily as needed for Anxiety or Sleep. Max Daily Amount: 2 mg. 60 Tab 2    DULoxetine (CYMBALTA) 30 mg capsule Take 1 Cap by mouth daily. 30 Cap 3    lamoTRIgine (LaMICtal) 100 mg tablet Take 1 Tab by mouth two (2) times a day. 60 Tab 3    QUEtiapine (SEROquel) 25 mg tablet Take 1-2 tablets by mouth at bedtime as needed for anxiety/agitation/sleep 60 Tab 3    albuterol (PROVENTIL HFA, VENTOLIN HFA, PROAIR HFA) 90 mcg/actuation inhaler Take 1 Puff by inhalation every four (4) hours as needed for Wheezing.  1 Inhaler 3     Allergies   Allergen Reactions    Codeine Other (comments)     Hallucinations, raise blood pressure     Hydrocodone Hives    Lortab [Hydrocodone-Acetaminophen] Hives and Itching    Tramadol Nausea Only     Headache     Past Medical History:   Diagnosis Date    Anxiety disorder     Arthritis     Asthma     Bipolar 1 disorder (HCC)     Depression     Frequent headaches     Frequent headaches     Hypertension     Migraine     Muscle pain     Other ill-defined conditions(799.89)     dysplagia    Psychiatric disorder     bipolar, anxiety, depression, PTSD    PTSD (post-traumatic stress disorder)      Past Surgical History:   Procedure Laterality Date    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    HX GYN  3-    birth   [facility] HX OTHER SURGICAL      Dysplasia     Family History   Problem Relation Age of Onset    Cancer Mother     Diabetes Father     Other Father         Aneursym   [facility] Migraines Sister      Social History     Tobacco Use    Smoking status: Current Every Day Smoker     Packs/day: 0.50     Years: 14.00     Pack years: 7.00     Types: Cigarettes     Start date: 2006    Smokeless tobacco: Never Used   Substance Use Topics    Alcohol use: Yes     Alcohol/week: 0.0 standard drinks     Comment: social      Lab Results   Component Value Date/Time    WBC 6.3 12/09/2015 12:00 PM    HGB 11.5 12/09/2015 12:00 PM    HCT 34.9 (L) 12/09/2015 12:00 PM    PLATELET 077 44/48/6376 12:00 PM    MCV 88.8 12/09/2015 12:00 PM     No results found for: TSH, TSH2, TSH3, TSHP, TSHELE, TSHEXT, TT3, T3U, T3UP, FRT3, FT3, FT4, FT4P, T4, T4P, FT4T, TT7, TSHEXT      Review of Systems   Constitutional: Positive for malaise/fatigue. Negative for chills and fever. HENT: Negative for ear pain and nosebleeds. Eyes: Negative for blurred vision, pain and discharge. Respiratory: Negative for shortness of breath. Cardiovascular: Negative for chest pain and leg swelling.    Gastrointestinal: Negative for constipation, diarrhea, nausea and vomiting. Genitourinary: Negative for frequency. Musculoskeletal: Positive for myalgias. Negative for joint pain. Skin: Negative for itching and rash. Neurological: Negative for headaches. Psychiatric/Behavioral: Positive for depression. Negative for hallucinations, substance abuse and suicidal ideas. The patient is nervous/anxious and has insomnia. Physical Exam  Vitals signs and nursing note reviewed. Constitutional:       Appearance: She is well-developed. She is obese. She is not ill-appearing or toxic-appearing. HENT:      Head: Normocephalic and atraumatic. Mouth/Throat:      Mouth: Mucous membranes are moist.   Eyes:      Conjunctiva/sclera: Conjunctivae normal.      Pupils: Pupils are equal, round, and reactive to light. Neck:      Musculoskeletal: Normal range of motion and neck supple. Thyroid: No thyromegaly. Vascular: No JVD. Cardiovascular:      Rate and Rhythm: Normal rate and regular rhythm. Heart sounds: Normal heart sounds. No murmur. No friction rub. No gallop. Pulmonary:      Effort: Pulmonary effort is normal. No respiratory distress. Breath sounds: Normal breath sounds. No stridor. No wheezing or rales. Abdominal:      General: Bowel sounds are normal. There is no distension. Palpations: Abdomen is soft. There is no mass. Tenderness: There is no abdominal tenderness. Musculoskeletal: Normal range of motion. General: No tenderness. Lymphadenopathy:      Cervical: No cervical adenopathy. Skin:     Findings: No erythema or rash. Neurological:      Mental Status: She is alert and oriented to person, place, and time. Cranial Nerves: No cranial nerve deficit. Deep Tendon Reflexes: Reflexes are normal and symmetric. Psychiatric:         Attention and Perception: Attention and perception normal. She does not perceive auditory or visual hallucinations.          Mood and Affect: Mood is anxious and depressed. Mood is not elated. Affect is labile and flat. Affect is not blunt, angry, tearful or inappropriate. Speech: She is communicative. Speech is rapid and pressured. Speech is not delayed, slurred or tangential.         Behavior: Behavior normal. Behavior is not agitated, aggressive, withdrawn, hyperactive or combative. Behavior is cooperative. Thought Content: Thought content normal. Thought content is not paranoid or delusional. Thought content does not include homicidal or suicidal ideation. Thought content does not include homicidal or suicidal plan. Cognition and Memory: Cognition and memory normal. Memory is not impaired. She does not exhibit impaired recent memory or impaired remote memory. Judgment: Judgment normal. Judgment is not inappropriate. ASSESSMENT and PLAN  Diagnoses and all orders for this visit:    1. Gestational hypertension, third trimester    2. Anxiety and depression  -     DULoxetine (CYMBALTA) 30 mg capsule; Take 1 Cap by mouth daily. -     lamoTRIgine (LaMICtal) 100 mg tablet; Take 1 Tab by mouth two (2) times a day. -     QUEtiapine (SEROquel) 25 mg tablet; Take 1-2 tablets by mouth at bedtime as needed for anxiety/agitation/sleep  -     clonazePAM (KlonoPIN) 1 mg tablet; Take 1 Tab by mouth two (2) times daily as needed for Anxiety or Sleep. Max Daily Amount: 2 mg.  -     REFERRAL TO PSYCHIATRY    3. Bipolar 2 disorder, major depressive episode (HCC)  -     DULoxetine (CYMBALTA) 30 mg capsule; Take 1 Cap by mouth daily. -     lamoTRIgine (LaMICtal) 100 mg tablet; Take 1 Tab by mouth two (2) times a day. -     QUEtiapine (SEROquel) 25 mg tablet; Take 1-2 tablets by mouth at bedtime as needed for anxiety/agitation/sleep  -     clonazePAM (KlonoPIN) 1 mg tablet; Take 1 Tab by mouth two (2) times daily as needed for Anxiety or Sleep. Max Daily Amount: 2 mg.  -     REFERRAL TO PSYCHIATRY    4.  Advice given about COVID-19 virus infection    Other orders  -     labetaloL (NORMODYNE) 300 mg tablet; TAKE 1 TABLET BY MOUTH THREE TIMES A DAY        Depression with anxiety in a stable condition, not suicidal, start antianxiety and calming medication for now will reevaluate in 3 w for progression   in addition Counseling , social support, spiritual belonging, inc physical activity, all offered,  compliance advised, patient was told that should not mix any of current medication with any other illicit drugs, should not drink any alcoholic beverages while on such medication patient was also told to not operate any machinery while under the influence patient acknowledged understanding and agreed with today's recommendation in addition patient was told to call for any concern

## 2020-12-10 NOTE — PATIENT INSTRUCTIONS
Clonazepam (By mouth) Clonazepam (vtri-DFA-b-honorio) Treats seizures and panic disorder. Brand Name(s): KlonoPIN There may be other brand names for this medicine. When This Medicine Should Not Be Used: This medicine is not right for everyone. Do not use it if you had an allergic reaction to clonazepam or similar medicines, or if you are pregnant or breastfeeding. How to Use This Medicine:  
Tablet, Dissolving Tablet · Take your medicine as directed. Your dose may need to be changed several times to find what works best for you. · Disintegrating tablet: Dry your hands before you handle the tablet. Place the tablet on your tongue and let it dissolve. · Tablet: Swallow whole with water. · This medicine should come with a Medication Guide. Ask your pharmacist for a copy if you do not have one. · Missed dose: Take a dose as soon as you remember. If it is almost time for your next dose, wait until then and take a regular dose. Do not take extra medicine to make up for a missed dose. · Store the medicine in a closed container at room temperature, away from heat, moisture, and direct light. Drugs and Foods to Avoid: Ask your doctor or pharmacist before using any other medicine, including over-the-counter medicines, vitamins, and herbal products. · Some medicines can affect how clonazepam works. Tell your doctor if you are using any of the following: ¨ Carbamazepine, phenobarbital, phenytoin ¨ Medicine to treat depression or mental health problems, including MAO inhibitors ¨ Medicine to treat fungal infections ¨ Phenothiazine medicine · Do not drink alcohol while you are using this medicine. · Tell your doctor if you use anything else that makes you sleepy. Some examples are allergy medicine, narcotic pain medicine, and alcohol. Warnings While Using This Medicine: · It is not safe to take this medicine during pregnancy.  It could harm an unborn baby. Tell your doctor right away if you become pregnant. · Tell your doctor if you have kidney disease, liver disease, glaucoma, lung disease or breathing problems, porphyria, or a history of drug or alcohol abuse, depression, or mental health problems. · This medicine can increase thoughts of suicide. Tell your doctor right away if you start to feel depressed and have thoughts about hurting yourself. · This medicine can be habit-forming. Do not use more than your prescribed dose. Call your doctor if you think your medicine is not working. · Do not stop using this medicine suddenly. Your doctor will need to slowly decrease your dose before you stop it completely. · This medicine may make you dizzy or drowsy. Do not drive or do anything else that could be dangerous until you know how this medicine affects you. · Your doctor will do lab tests at regular visits to check on the effects of this medicine. Keep all appointments. · Keep all medicine out of the reach of children. Never share your medicine with anyone. Possible Side Effects While Using This Medicine:  
Call your doctor right away if you notice any of these side effects: · Allergic reaction: Itching or hives, swelling in your face or hands, swelling or tingling in your mouth or throat, chest tightness, trouble breathing · Confusion, memory problems · Depression, unusual moods or behavior, thoughts of hurting yourself · Extreme drowsiness, tiredness, or weakness, slow heartbeat, problems with coordination or walking · Worsening seizures If you notice these less serious side effects, talk with your doctor: · Cough, runny or stuffy nose, sore throat · Increased saliva If you notice other side effects that you think are caused by this medicine, tell your doctor. Call your doctor for medical advice about side effects. You may report side effects to FDA at 9-253-VGD-1724 © 2017 [facility] Information is for End User's use only and may not be sold, redistributed or otherwise used for commercial purposes. The above information is an  only. It is not intended as medical advice for individual conditions or treatments. Talk to your doctor, nurse or pharmacist before following any medical regimen to see if it is safe and effective for you. Learning About Mood Disorders What are mood disorders? Mood disorders are medical problems that affect how you feel. They can impact your moods, thoughts, and actions. Mood disorders include: · Depression. This causes you to feel sad or hopeless for much of the time. · Bipolar disorder. This causes extreme mood changes from manic episodes of very high energy to extreme lows of depression. · Seasonal affective disorder (SAD). This is a type of depression that affects you during the same season each year. Most often people experience SAD during the fall and winter months when days are shorter and there is less light. What are the symptoms? Depression You may: · Feel sad or hopeless nearly every day. · Lose interest in or not get pleasure from most daily activities. You feel this way nearly every day. · Have low energy, changes in your appetite, or changes in how well you sleep. · Have trouble concentrating. · Think about death and suicide. Keep the numbers for these national suicide hotlines: 7-614-478-TALK (6-112.846.1736) and 0-746-KGVBEZL (1-420.543.7577). If you or someone you know talks about suicide or feeling hopeless, get help right away. Bipolar disorder Symptoms depend on your mood swings. You may: · Feel very happy, energetic, or on edge. · Feel like you need very little sleep. · Feel overly self-confident. · Do impulsive things, such as spending a lot of money. · Feel sad or hopeless. · Have racing thoughts or trouble thinking and making decisions. · Lose interest in things you have enjoyed in the past. 
· Think about death and suicide. Keep the numbers for these national suicide hotlines: 9-746-721-TALK (8-908.555.5301) and 7-893-GFOBNRY (7-312.252.9271). If you or someone you know talks about suicide or feeling hopeless, get help right away. Seasonal affective disorder (SAD) Symptoms come and go at about the same time each year. For most people with SAD, symptoms come during the winter when there is less daylight. You may: · Feel sad, grumpy, mari, or anxious. · Lose interest in your usual activities. · Eat more and crave carbohydrates, such as bread and pasta. · Gain weight. · Sleep more and feel drowsy during the daytime. How are mood disorders treated? Mood disorders can be treated with medicines or counseling, or a combination of both. Medicines for depression and SAD may include antidepressants. Medicines for bipolar disorder may include: · Mood stabilizers. · Antipsychotics. · Benzodiazepines. Counseling may involve cognitive-behavioral therapy. It teaches you how to change the ways you think and behave. This can help you stop thinking bad thoughts about yourself and your life. Light therapy is the main treatment for SAD. This therapy uses a special kind of lamp. You let the lamp shine on you at certain times, usually in the morning. This may help your symptoms during the months when there is less sunlight. Healthy lifestyle Healthy lifestyle changes may help you feel better. · Be active often. You might try walking or strength training. · Eat a healthy diet. Include fruits, vegetables, lean proteins, and whole grains in your diet each day. · Keep a regular sleep schedule. Try for 8 hours of sleep a night. · Find ways to manage stress, such as relaxation exercises. · Avoid alcohol and illegal drugs. Follow-up care is a key part of your treatment and safety.  Be sure to make and go to all appointments, and call your doctor if you are having problems. It's also a good idea to know your test results and keep a list of the medicines you take. Where can you learn more? Go to http://www.gray.com/ Enter J741 in the search box to learn more about \"Learning About Mood Disorders. \" Current as of: January 31, 2020               Content Version: 12.6 © 2006-2020 [employer]. Care instructions adapted under license by [employer] (which disclaims liability or warranty for this information). If you have questions about a medical condition or this instruction, always ask your healthcare professional. [address] any warranty or liability for your use of this information.

## 2020-12-10 NOTE — PROGRESS NOTES
Chief Complaint   Patient presents with   [facility] Follow-up     1. Have you been to the ER, urgent care clinic since your last visit? Hospitalized since your last visit? Yes When: 12/3/20 Where: [facility]  Reason for visit: elevated blood pressure    2. Have you seen or consulted any other health care providers outside of the [address] [place] [address] since your last visit? Include any pap smears or colon screening. No      . Verified patient with two type of identifiers. seen at [address] [place] [address] ER on 12/3/20 for elevated blood pressure,  Given labetalol and advised to follow up with PCP, c/o   having headaches.   History of pre-eclampsia with last birth on 11/13/2020,   Dr Jose Guadalupe Le aware of elevated blood pressure

## 2020-12-18 ENCOUNTER — HOSPITAL ENCOUNTER (EMERGENCY)
Age: 35
Discharge: HOME | End: 2020-12-18
Attending: EMERGENCY MEDICINE
Payer: MEDICAID

## 2020-12-18 ENCOUNTER — APPOINTMENT (OUTPATIENT)
Dept: GENERAL RADIOLOGY | Age: 35
End: 2020-12-18
Attending: EMERGENCY MEDICINE
Payer: MEDICAID

## 2020-12-18 VITALS
SYSTOLIC BLOOD PRESSURE: 159 MMHG | BODY MASS INDEX: 33.31 KG/M2 | TEMPERATURE: 98.4 F | HEART RATE: 94 BPM | WEIGHT: 181 LBS | DIASTOLIC BLOOD PRESSURE: 106 MMHG | HEIGHT: 62 IN | OXYGEN SATURATION: 100 % | RESPIRATION RATE: 20 BRPM

## 2020-12-18 DIAGNOSIS — M19.90 INFLAMMATORY ARTHROPATHY: Primary | ICD-10-CM

## 2020-12-18 PROCEDURE — 74011250637 HC RX REV CODE- 250/637: Performed by: EMERGENCY MEDICINE

## 2020-12-18 PROCEDURE — 99283 EMERGENCY DEPT VISIT LOW MDM: CPT

## 2020-12-18 PROCEDURE — 73630 X-RAY EXAM OF FOOT: CPT

## 2020-12-18 RX ORDER — COLCHICINE 0.6 MG/1
0.6 TABLET ORAL DAILY
Qty: 2 TAB | Refills: 0 | Status: SHIPPED | OUTPATIENT
Start: 2020-12-18 | End: 2020-12-20

## 2020-12-18 RX ORDER — OXYCODONE AND ACETAMINOPHEN 5; 325 MG/1; MG/1
2 TABLET ORAL
Status: COMPLETED | OUTPATIENT
Start: 2020-12-18 | End: 2020-12-18

## 2020-12-18 RX ORDER — COLCHICINE 0.6 MG/1
1.2 TABLET ORAL
Status: COMPLETED | OUTPATIENT
Start: 2020-12-18 | End: 2020-12-18

## 2020-12-18 RX ORDER — INDOMETHACIN 25 MG/1
75 CAPSULE ORAL
Status: COMPLETED | OUTPATIENT
Start: 2020-12-18 | End: 2020-12-18

## 2020-12-18 RX ORDER — INDOMETHACIN 25 MG/1
25 CAPSULE ORAL 3 TIMES DAILY
Qty: 21 CAP | Refills: 0 | Status: SHIPPED | OUTPATIENT
Start: 2020-12-18 | End: 2020-12-25

## 2020-12-18 RX ORDER — OXYCODONE AND ACETAMINOPHEN 5; 325 MG/1; MG/1
1 TABLET ORAL
Qty: 5 TAB | Refills: 0 | Status: SHIPPED | OUTPATIENT
Start: 2020-12-18 | End: 2020-12-21

## 2020-12-18 RX ADMIN — COLCHICINE 1.2 MG: 0.6 TABLET, FILM COATED ORAL at 06:44

## 2020-12-18 RX ADMIN — INDOMETHACIN 75 MG: 25 CAPSULE ORAL at 06:44

## 2020-12-18 RX ADMIN — OXYCODONE AND ACETAMINOPHEN 2 TABLET: 5; 325 TABLET ORAL at 06:44

## 2020-12-18 NOTE — DISCHARGE INSTRUCTIONS
Patient Education        Arthritis: Care Instructions  Overview  Arthritis, also called osteoarthritis, is a breakdown of the cartilage that cushions your joints. When the cartilage wears down, your bones rub against each other. This causes pain and stiffness. Many people have some arthritis as they age. Arthritis most often affects the joints of the spine, hands, hips, knees, or feet. Arthritis never goes away completely. But medicine and home treatment can help reduce pain and help you stay active. Follow-up care is a key part of your treatment and safety. Be sure to make and go to all appointments, and call your doctor if you are having problems. It's also a good idea to know your test results and keep a list of the medicines you take. How can you care for yourself at home? · Stay at a healthy weight. Being overweight puts extra strain on your joints. · Talk to your doctor or physical therapist about exercises that will help ease joint pain. ? Stretch. You may enjoy gentle forms of yoga to help keep your joints and muscles flexible. ? Walk instead of jog. Other types of exercise that are less stressful on the joints include riding a bike, swimming, israel chi, or water exercise. ? Lift weights. Strong muscles help reduce stress on your joints. Stronger thigh muscles, for example, take some of the stress off of the knees and hips. Learn the right way to lift weights so you don't make joint pain worse. · Take your medicines exactly as prescribed. Call your doctor if you think you are having a problem with your medicine. · Take pain medicines exactly as directed. ? If the doctor gave you a prescription medicine for pain, take it as prescribed. ? If you are not taking a prescription pain medicine, ask your doctor if you can take an over-the-counter medicine. · Use a cane, crutch, walker, or another device if you need help to get around. These can help rest your joints.  You also can use other things to make life easier, such as a higher toilet seat and padded handles on kitchen utensils. · Do not sit in low chairs. They can make it hard to get up. · Put heat or cold on your sore joints as needed. Use whichever helps you most. You can also switch between hot and cold packs. ? Apply heat 2 or 3 times a day for 20 to 30 minutes--using a heating pad, hot shower, or hot pack--to relieve pain and stiffness. But don't use heat on a swollen joint. ? Put ice or a cold pack on your sore joint for 10 to 20 minutes at a time. Put a thin cloth between the ice and your skin. When should you call for help? Call your doctor now or seek immediate medical care if:    · You have sudden swelling, warmth, or pain in any joint.     · You have joint pain and a fever or rash.     · You have such bad pain that you cannot use a joint. Watch closely for changes in your health, and be sure to contact your doctor if:    · You have mild joint symptoms that continue even with more than 6 weeks of care at home.     · You have stomach pain or other problems with your medicine. Where can you learn more? Go to http://www.gray.com/  Enter D466 in the search box to learn more about \"Arthritis: Care Instructions. \"  Current as of: December 9, 2019               Content Version: 12.6  © 3782-7461 [employer]. Care instructions adapted under license by [employer] (which disclaims liability or warranty for this information). If you have questions about a medical condition or this instruction, always ask your healthcare professional. [place] [address] any warranty or liability for your use of this information. Patient Education        Gout: Care Instructions  Your Care Instructions     Gout is a form of arthritis caused by a buildup of uric acid crystals in a joint.  It causes sudden attacks of pain, swelling, redness, and stiffness, usually in one joint, especially the big toe.  Gout usually comes on without a cause. But it can be brought on by drinking alcohol (especially beer) or eating seafood and red meat. Taking certain medicines, such as diuretics or aspirin, also can bring on an attack of gout. Taking your medicines as prescribed and following up with your doctor regularly can help you avoid gout attacks in the future. Follow-up care is a key part of your treatment and safety. Be sure to make and go to all appointments, and call your doctor if you are having problems. It's also a good idea to know your test results and keep a list of the medicines you take. How can you care for yourself at home? · If the joint is swollen, put ice or a cold pack on the area for 10 to 20 minutes at a time. Put a thin cloth between the ice and your skin. · Prop up the sore limb on a pillow when you ice it or anytime you sit or lie down during the next 3 days. Try to keep it above the level of your heart. This will help reduce swelling. · Rest sore joints. Avoid activities that put weight or strain on the joints for a few days. Take short rest breaks from your regular activities during the day. · Take your medicines exactly as prescribed. Call your doctor if you think you are having a problem with your medicine. · Take pain medicines exactly as directed. ? If the doctor gave you a prescription medicine for pain, take it as prescribed. ? If you are not taking a prescription pain medicine, ask your doctor if you can take an over-the-counter medicine. · Eat less seafood and red meat. · Check with your doctor before drinking alcohol. · Losing weight, if you are overweight, may help reduce attacks of gout. But do not go on a [employer]. \" Losing a lot of weight in a short amount of time can cause a gout attack. When should you call for help?    Call your doctor now or seek immediate medical care if:    · You have a fever.     · The joint is so painful you cannot use it.     · You have sudden, unexplained swelling, redness, warmth, or severe pain in one or more joints. Watch closely for changes in your health, and be sure to contact your doctor if:    · You have joint pain.     · Your symptoms get worse or are not improving after 2 or 3 days. Where can you learn more? Go to http://www.gray.com/  Enter E531 in the search box to learn more about \"Gout: Care Instructions. \"  Current as of: December 9, 2019               Content Version: 12.6  © 4538-5537 [employer]. Care instructions adapted under license by [employer] (which disclaims liability or warranty for this information). If you have questions about a medical condition or this instruction, always ask your healthcare professional. [address] any warranty or liability for your use of this information.

## 2020-12-18 NOTE — LETTER
[facility] EMERGENCY DEPT 
[address] [facility] [address] 
[phone] Work/School Note Date: 12/18/2020 To Whom It May concern: 
 
Mateo Park was seen and treated today in the emergency room by the following provider(s): 
Attending Provider: Mary Ellen Mata MD.   
 
Mateo Park may return to work on 12/20/2020. Sincerely, Audra FAGAN

## 2020-12-18 NOTE — ED NOTES
Patient c/o left toe pain X1 week. Denies injury to toes. Last three toes to left foot are red, warm to touch and slight edema noted. Patient states that are is painful to touch. Good PMS in extremity.

## 2020-12-18 NOTE — ED NOTES
Bedside and Verbal shift change report given to Audra RN (oncoming nurse) by Earl Alvarez RN (offgoing nurse). Report included the following information SBAR, Kardex, ED Summary, [facility] and Recent Results.

## 2020-12-18 NOTE — ED NOTES
Patient given copy of dc instructions and 3 script(s). Patient  verbalized understanding of instructions and script (s). Patient given a current medication reconciliation form and verbalized understanding of their medications. Patient  verbalized understanding of the importance of discussing medications with  his or her physician or clinic they will be following up with. Patient alert and oriented and in no acute distress. Patient discharged home ambulatory with  driving.

## 2020-12-18 NOTE — ED TRIAGE NOTES
Emergency Department Nursing Plan of Care       The Nursing Plan of Care is developed from the Nursing assessment and Emergency Department Attending provider initial evaluation. The plan of care may be reviewed in the ED Provider note.     The Plan of Care was developed with the following considerations:   Patient / Family readiness to learn indicated by:verbalized understanding  Persons(s) to be included in education: patient  Barriers to Learning/Limitations:No    Signed     Mahamed Ryan, [facility]    12/18/2020   6:38 AM

## 2020-12-21 ENCOUNTER — PATIENT OUTREACH (OUTPATIENT)
Dept: CASE MANAGEMENT | Age: 35
End: 2020-12-21

## 2020-12-21 ENCOUNTER — VIRTUAL VISIT (OUTPATIENT)
Dept: FAMILY MEDICINE CLINIC | Age: 35
End: 2020-12-21
Payer: MEDICAID

## 2020-12-21 DIAGNOSIS — Z71.89 ADVICE GIVEN ABOUT COVID-19 VIRUS INFECTION: ICD-10-CM

## 2020-12-21 DIAGNOSIS — M79.672 LEFT FOOT PAIN: ICD-10-CM

## 2020-12-21 DIAGNOSIS — M10.00 ACUTE IDIOPATHIC GOUT, UNSPECIFIED SITE: Primary | ICD-10-CM

## 2020-12-21 PROCEDURE — 99213 OFFICE O/P EST LOW 20 MIN: CPT | Performed by: FAMILY MEDICINE

## 2020-12-21 RX ORDER — METHYLPREDNISOLONE 4 MG/1
TABLET ORAL
Qty: 1 DOSE PACK | Refills: 0 | Status: SHIPPED | OUTPATIENT
Start: 2020-12-21 | End: 2021-01-29 | Stop reason: ALTCHOICE

## 2020-12-21 RX ORDER — DICLOFENAC SODIUM 10 MG/G
4 GEL TOPICAL 4 TIMES DAILY
Qty: 100 G | Refills: 3 | Status: SHIPPED | OUTPATIENT
Start: 2020-12-21 | End: 2021-03-02 | Stop reason: SDUPTHER

## 2020-12-21 NOTE — ACP (ADVANCE CARE PLANNING)
Advance Care Planning:   Does patient have an Advance Directive: currently not on file; education provided     Patient verified healthcare decision makers as correctly listed in chart: Glenn Nyla (other relative) [phone] and Robert Smiley (friend) [phone]  Mary Evans, RN  Ambulatory Care Manager

## 2020-12-21 NOTE — PROGRESS NOTES
HISTORY OF PRESENT ILLNESS  Kecia Kirby is a 30 y.o. female. foot pain went to ER few days ago w3as told to have gout, left foot the three toes up in her leg  > 9/10,     Does not know how high her UA is states that she went to [facility], was given percocet's, colchicine and indocin to be taken prn,   States that she was given only 5 tabs of percocets and needs more of it,       COMPARISON: None. FINDINGS: Three views of the left foot demonstrate no fracture or other acute   osseous or articular abnormality. The soft tissues are within normal limits. The history is provided by the patient. This is a chronic problem. No history of flat-footed patient denies any long periods of standing and walking on hard concrete floor denies any current calluses or plantar warts, stating that this episode onset was almost 6 yrs ago, BMI of nl not obese not ow, the patient is able to  walk for >1-2 blocks, the pain worsens with working mopping, and any going up and down the steps and it is becoming to be constantly regardless the pain intensity has changed and worsening since onset. Patient states that it is a dull nonradiating no numbness ++++ tingling sensation disabling, morning stiffness which gets better with activity and with the severity of 8/10. in addition stating that current pain is also aggravated by movement and palpation. So for patient denies any history of extremity trauma, and has no leg swelling no skin lesion noted. Current Outpatient Medications   Medication Sig Dispense Refill    methylPREDNISolone (MEDROL DOSEPACK) 4 mg tablet As directed for 6 days one package 1 Dose Pack 0    indomethacin (INDOCIN) 25 mg capsule Take 1 Cap by mouth three (3) times daily for 7 days. With food 21 Cap 0    oxyCODONE-acetaminophen (Percocet) 5-325 mg per tablet Take 1 Tab by mouth every eight (8) hours as needed for Pain for up to 3 days. Max Daily Amount: 3 Tabs.  Indications: pain 5 Tab 0    labetaloL (NORMODYNE) 300 mg tablet TAKE 1 TABLET BY MOUTH THREE TIMES A DAY 90 Tab 2    DULoxetine (CYMBALTA) 30 mg capsule Take 1 Cap by mouth daily. 30 Cap 3    lamoTRIgine (LaMICtal) 100 mg tablet Take 1 Tab by mouth two (2) times a day. 60 Tab 3    QUEtiapine (SEROquel) 25 mg tablet Take 1-2 tablets by mouth at bedtime as needed for anxiety/agitation/sleep 60 Tab 3    clonazePAM (KlonoPIN) 1 mg tablet Take 1 Tab by mouth two (2) times daily as needed for Anxiety or Sleep. Max Daily Amount: 2 mg. 60 Tab 2    loratadine (CLARITIN) 10 mg tablet TAKE 1 TABLET BY MOUTH DAILY AS NEEDED FOR ALLERGIES. 30 Tab 1    albuterol (PROVENTIL HFA, VENTOLIN HFA, PROAIR HFA) 90 mcg/actuation inhaler Take 1 Puff by inhalation every four (4) hours as needed for Wheezing. 1 Inhaler 3     Allergies   Allergen Reactions    Codeine Other (comments)     Hallucinations, raise blood pressure     Hydrocodone Hives    Lortab [Hydrocodone-Acetaminophen] Hives and Itching    Tramadol Nausea Only     Headache     Past Medical History:   Diagnosis Date    Anxiety disorder     Arthritis     Asthma     Bipolar 1 disorder (HCC)     Depression     Frequent headaches     Frequent headaches     Hypertension     Migraine     Muscle pain     Other ill-defined conditions(799.89)     dysplagia    Psychiatric disorder     bipolar, anxiety, depression, PTSD    PTSD (post-traumatic stress disorder)      Past Surgical History:   Procedure Laterality Date    HX AMPUTATION Left     left hand middle finger.  8/2014    HX GYN  3-    birth   [facility] HX OTHER SURGICAL      Dysplasia     Family History   Problem Relation Age of Onset    Cancer Mother     Diabetes Father     Other Father         [place]ne[place]   [facility] Migraines Sister      Social History     Tobacco Use    Smoking status: Current Every Day Smoker     Packs/day: 0.50     Years: 14.00     Pack years: 7.00     Types: Cigarettes     Start date: 2006    Smokeless tobacco: Never Used Substance Use Topics    Alcohol use: Yes     Alcohol/week: 0.0 standard drinks     Comment: social      Lab Results   Component Value Date/Time    Hemoglobin A1c 5.3 09/10/2014 12:22 PM    Glucose 82 12/09/2015 12:00 PM    LDL, calculated 115 (H) 09/10/2014 12:22 PM    Creatinine 0.95 12/09/2015 12:00 PM      Lab Results   Component Value Date/Time    Glucose 82 12/09/2015 12:00 PM         Review of Systems   Constitutional: Negative for chills and fever. HENT: Negative for ear pain and nosebleeds. Eyes: Negative for blurred vision, pain and discharge. Respiratory: Negative for shortness of breath. Cardiovascular: Negative for chest pain and leg swelling. Gastrointestinal: Negative for constipation, diarrhea, nausea and vomiting. Genitourinary: Negative for frequency. Musculoskeletal: Positive for joint pain and myalgias. Skin: Negative for itching and rash. Neurological: Negative for weakness and headaches. Psychiatric/Behavioral: Negative for depression. The patient is not nervous/anxious and does not have insomnia. Physical Exam  Constitutional:       Appearance: She is obese. She is not ill-appearing or toxic-appearing. HENT:      Head: Normocephalic and atraumatic. Mouth/Throat:      Mouth: Mucous membranes are moist.   Musculoskeletal:         General: Tenderness present. No swelling, deformity or signs of injury. Right lower leg: No edema. Left lower leg: No edema. Neurological:      Mental Status: She is alert and oriented to person, place, and time. Psychiatric:         Mood and Affect: Mood normal.         Behavior: Behavior normal.         Thought Content: Thought content normal.         Judgment: Judgment normal.         ASSESSMENT and PLAN  Diagnoses and all orders for this visit:    1. Acute idiopathic gout, unspecified site  -     methylPREDNISolone (MEDROL DOSEPACK) 4 mg tablet; As directed for 6 days one package    2.  Left foot pain  - methylPREDNISolone (MEDROL DOSEPACK) 4 mg tablet; As directed for 6 days one package    3. Advice given about COVID-19 virus infection    Other orders  -     diclofenac (VOLTAREN) 1 % gel; Apply 4 g to affected area four (4) times daily. Patient advised to have the mask on most of the time, social distance and handwashing avoid crowded area pursuant to the emergency declaration under the [address]  and [address] authority and the [employer] Act, this Virtual Visit was conducted, with patient's consent, to reduce the patient's risk of exposure to COVID-19 and provide continuity of care for an established patient  Services were provided through a Video synchronous discussion virtually to substitute for in-person appointment.

## 2020-12-21 NOTE — PROGRESS NOTES
Patient contacted regarding recent discharge and COVID-19 risk. Did not discuss COVID-19 related testing which was not done at this time. Ambulatory Care Manager contacted the patient by telephone to perform post discharge assessment. Verified name and  with patient as identifiers. Patient has following risk factors of: asthma and ED visit 20. ACM reviewed discharge instructions, medical action plan and red flags related to discharge diagnosis. Reviewed and educated them on any new and changed medications related to discharge diagnosis. Patient denied questions or concerns regarding discharge instructions or medications. Advance Care Planning:   Does patient have an Advance Directive: currently not on file; education provided     Patient verified healthcare decision makers as correctly listed in chart: Jeremiah Pacheco (other relative) [phone] and Ross Fofana (friend) [phone]    Education provided regarding infection prevention, and signs and symptoms of COVID-19 and when to seek medical attention with patient through [facility] patient portal as requested. Also included exposure protocols and quarantine from [address] [place] [address] you at higher risk for severe illness  through [facility] patient portal. The patient was supplied the COVID-19 hotline [phone] and [facility] hotline [phone]. [facility] provided contact information for future reference. From CDC: Are you at higher risk for severe illness?  Wash your hands often and avoid touching eyes, nose and mouth.  Avoid close contact (6 feet, which is about two arm lengths) with people who are sick.  Put distance between yourself and other people if COVID-19 is spreading in your community.  Clean and disinfect frequently touched surfaces.  Avoid all cruise travel and non-essential air travel.    Call your healthcare professional if you have concerns about COVID-19 and your underlying condition or if you are sick.    For more information on steps you can take to protect yourself, see CDC's How to Protect Yourself      Patient/family/caregiver given information for GetWell Loop and agrees to enroll yes  Patient's preferred e-mail:  [email or web address]  Patient's preferred phone number: [phone]  Based on Loop alert triggers, patient will be contacted by nurse care manager for worsening symptoms. Pt will be further monitored by COVID Loop Team based on severity of symptoms and risk factors.     Tripp Vu, RN  Ambulatory Care Manager

## 2020-12-24 ENCOUNTER — OFFICE VISIT (OUTPATIENT)
Dept: FAMILY MEDICINE CLINIC | Age: 35
End: 2020-12-24
Payer: MEDICAID

## 2020-12-24 VITALS
BODY MASS INDEX: 35.06 KG/M2 | HEART RATE: 94 BPM | WEIGHT: 190.5 LBS | HEIGHT: 62 IN | TEMPERATURE: 98.3 F | OXYGEN SATURATION: 99 % | RESPIRATION RATE: 16 BRPM | DIASTOLIC BLOOD PRESSURE: 106 MMHG | SYSTOLIC BLOOD PRESSURE: 150 MMHG

## 2020-12-24 DIAGNOSIS — M79.672 LEFT FOOT PAIN: Primary | ICD-10-CM

## 2020-12-24 DIAGNOSIS — Z71.89 ADVICE GIVEN ABOUT COVID-19 VIRUS INFECTION: ICD-10-CM

## 2020-12-24 DIAGNOSIS — O13.3 GESTATIONAL HTN, THIRD TRIMESTER: ICD-10-CM

## 2020-12-24 PROCEDURE — 99213 OFFICE O/P EST LOW 20 MIN: CPT | Performed by: FAMILY MEDICINE

## 2020-12-24 RX ORDER — NORTRIPTYLINE HYDROCHLORIDE 25 MG/1
25 CAPSULE ORAL
Qty: 30 CAP | Refills: 1 | Status: SHIPPED | OUTPATIENT
Start: 2020-12-24 | End: 2021-03-02 | Stop reason: SDUPTHER

## 2020-12-24 NOTE — PATIENT INSTRUCTIONS
Arch Pain: Exercises Introduction Here are some examples of exercises for you to try. The exercises may be suggested for a condition or for rehabilitation. Start each exercise slowly. Ease off the exercises if you start to have pain. You will be told when to start these exercises and which ones will work best for you. How to do the exercises Plantar fascia stretch 1. Sit in a chair and put your affected foot on your other knee. 2. Hold the heel of your foot in one hand, and grasp your toes with the other hand. 3. Pull on your heel (toward your body), and at the same time pull your toes back with your other hand. 4. You should feel a stretch along the bottom of your foot. 5. Hold 15 to 30 seconds. 6. Repeat 2 to 4 times. Plantar fascia stretch (kneeling) You may want to place a pillow under your knees for this exercise. 1. Get on your hands and knees on the floor. Keep your heels pointing up and the balls of your feet and your toes on the floor. 2. Slowly sit back toward your ankles. 3. If this is too hard, you can try doing it one leg at a time. Stand up, and then kneel on one knee and keep the other leg forward. Place the foot of your forward leg flat on the ground and bend that knee. The heel on the leg still behind you should point up. The ball and toes of that foot should be on the floor. Sit back toward that ankle. 4. Hold 15 to 30 seconds. 5. Repeat 2 to 4 times. Switch legs if you are doing this one leg at a time. Plantar fascia self-massage 1. Sit in a chair. 2. Place your affected foot on a firm, tube-shaped object, such as a can or water bottle. 3. Roll your foot back and forth over the object to massage the bottom of your foot. 4. If you want to do ice massage, fill a water bottle about three-fourths of the way full and freeze before using. 5. Continue for 2 to 5 minutes. Bilateral calf stretch (knees straight) 1. Place a book on the floor a few inches from a wall or countertop, and put the balls of your feet on it. Your heels should be on the floor. The book needs to be thick enough so that you can feel a gentle stretch in each calf. If you are not steady on your feet, hold on to a chair, counter, or wall while you do this stretch. 2. Keep your knees straight, and lean forward until you feel a stretch in each calf. 3. To get more stretch, add another book or use a thicker book, such as a phone book, a dictionary, or an encyclopedia. 4. Hold the stretch for at least 15 to 30 seconds. 5. Repeat 2 to 4 times. Bilateral calf stretch (knees bent) 1. Place a book on the floor a few inches from a wall or countertop, and put the balls of your feet on it. Your heels should be on the floor. The book needs to be thick enough so that you can feel a gentle stretch in each calf. If you are not steady on your feet, hold on to a chair, counter, or wall while you do this stretch. 2. Bend your knees, and lean forward until you feel a stretch in each calf. 3. To get more stretch, add another book or use a thicker book, such as a phone book, a dictionary, or an encyclopedia. 4. Hold the stretch for at least 15 to 30 seconds. 5. Repeat 2 to 4 times. [place] pick-ups 1. Put some marbles on the floor next to a cup. 
2. Sit down, and use the toes of your affected foot to lift up one marble from the floor at a time. Then try to put the marble in the cup. 
3. Repeat 8 to 12 times. Towel scrunches 1. Sit down, and place your affected foot on a towel on the floor. You may also do this with both feet on the towel. 2. Scrunch the towel toward you with your toes. Then use your toes to push the towel back into place. 3. Repeat 8 to 12 times. Heel raises on a step 1. Stand on the bottom step of a staircase, facing up toward the stairs. Put the balls of your feet on the step. If you are not steady on your feet, hold on to the banister or wall. 2. Keeping both knees straight, slowly lift your heels above the step so that you are standing on your toes. Then slowly lower your heels below the step and toward the floor. 3. Return to the starting position, with your feet even with the step. 4. Repeat 8 to 12 times. Follow-up care is a key part of your treatment and safety. Be sure to make and go to all appointments, and call your doctor if you are having problems. It's also a good idea to know your test results and keep a list of the medicines you take. Where can you learn more? Go to http://www.gray.com/ Enter H119 in the search box to learn more about \"Arch Pain: Exercises. \" Current as of: March 2, 2020               Content Version: 12.6 © 2006-2020 [employer]. Care instructions adapted under license by [employer] (which disclaims liability or warranty for this information). If you have questions about a medical condition or this instruction, always ask your healthcare professional. [address] any warranty or liability for your use of this information.

## 2020-12-24 NOTE — PROGRESS NOTES
Name and  Verified. Chief Complaint   Patient presents with    Follow-up     Gout    Hypertension     Elevated     Medication and gel is not working. Left foot pain    1. Have you been to the ER, urgent care clinic since your last visit? Hospitalized since your last visit? No      2. Have you seen or consulted any other health care providers outside of the [address] [place] [address] since your last visit? Include any pap smears or colon screening.     No

## 2020-12-24 NOTE — PROGRESS NOTES
HISTORY OF PRESENT ILLNESS  Yiar Sanchez is a 30 y.o. female. HTN  Today pt present for Bp check and++ Compliancy w/ the bp meds, last visit meds dosages increased but not taken it yet, having had the low salt diet ,  has been active, patien does not obtain the bp at home ,, today the pt denies Chest Pain, has no legs swelling no lightheadedness,      foot pain  The history is provided by the patient. This is a chronic problem. No history of flat-footed  BMI of  obese , the patient is able to walk for >1-2 blocks, the pain worsens with working mopping, and any going up and down the steps and it is becoming to be constantly regardless the pain intensity has changed and worsening since onset. Given steroid and indomethacin not helping, last visit patient was given Voltaren gel in addition to indomethacin and prednisone patient stating that none has been helping also stating that emergency room gave her Percocet dose only pain medication that was able to get rid of the pain she is unable to take hydrocodone tramadol codeine Lortab, but she is only willing to take Percocet for the current pain   Patient states that it is a dull nonradiating no numbness ++++ tingling sensation disabling, morning stiffness which gets better with activity and with the severity of 8/10. in addition stating that current pain is also aggravated by movement and palpation. So for patient denies any history of extremity trauma, and has no leg swelling no skin lesion noted. Current Outpatient Medications   Medication Sig Dispense Refill    methylPREDNISolone (MEDROL DOSEPACK) 4 mg tablet As directed for 6 days one package 1 Dose Pack 0    diclofenac (VOLTAREN) 1 % gel Apply 4 g to affected area four (4) times daily. 100 g 3    indomethacin (INDOCIN) 25 mg capsule Take 1 Cap by mouth three (3) times daily for 7 days.  With food 21 Cap 0    labetaloL (NORMODYNE) 300 mg tablet TAKE 1 TABLET BY MOUTH THREE TIMES A DAY 90 Tab 2    DULoxetine (CYMBALTA) 30 mg capsule Take 1 Cap by mouth daily. 30 Cap 3    lamoTRIgine (LaMICtal) 100 mg tablet Take 1 Tab by mouth two (2) times a day. 60 Tab 3    QUEtiapine (SEROquel) 25 mg tablet Take 1-2 tablets by mouth at bedtime as needed for anxiety/agitation/sleep 60 Tab 3    clonazePAM (KlonoPIN) 1 mg tablet Take 1 Tab by mouth two (2) times daily as needed for Anxiety or Sleep. Max Daily Amount: 2 mg. 60 Tab 2    loratadine (CLARITIN) 10 mg tablet TAKE 1 TABLET BY MOUTH DAILY AS NEEDED FOR ALLERGIES. 30 Tab 1    albuterol (PROVENTIL HFA, VENTOLIN HFA, PROAIR HFA) 90 mcg/actuation inhaler Take 1 Puff by inhalation every four (4) hours as needed for Wheezing. 1 Inhaler 3     Allergies   Allergen Reactions    Codeine Other (comments)     Hallucinations, raise blood pressure     Hydrocodone Hives    Lortab [Hydrocodone-Acetaminophen] Hives and Itching    Tramadol Nausea Only     Headache     Past Medical History:   Diagnosis Date    Anxiety disorder     Arthritis     Asthma     Bipolar 1 disorder (HCC)     Depression     Frequent headaches     Frequent headaches     Hypertension     Migraine     Muscle pain     Other ill-defined conditions(799.89)     dysplagia    Psychiatric disorder     bipolar, anxiety, depression, PTSD    PTSD (post-traumatic stress disorder)      Past Surgical History:   Procedure Laterality Date    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    HX GYN  3-    birth   [facility] HX OTHER SURGICAL      Dysplasia     Family History   Problem Relation Age of Onset    Cancer Mother     Diabetes Father     Other Father         Aneursym   [facility] [place] Migraines Sister      Social History     Tobacco Use    Smoking status: Current Every Day Smoker     Packs/day: 0.50     Years: 14.00     Pack years: 7.00     Types: Cigarettes     Start date: 2006    Smokeless tobacco: Never Used   Substance Use Topics    Alcohol use:  Yes     Alcohol/week: 0.0 standard drinks Comment: social      Lab Results   Component Value Date/Time    WBC 6.3 12/09/2015 12:00 PM    HGB 11.5 12/09/2015 12:00 PM    HCT 34.9 (L) 12/09/2015 12:00 PM    PLATELET 767 02/50/6853 12:00 PM    MCV 88.8 12/09/2015 12:00 PM     Lab Results   Component Value Date/Time    Hemoglobin A1c 5.3 09/10/2014 12:22 PM    Glucose 82 12/09/2015 12:00 PM    LDL, calculated 115 (H) 09/10/2014 12:22 PM    Creatinine 0.95 12/09/2015 12:00 PM      Lab Results   Component Value Date/Time    Cholesterol, total 186 09/10/2014 12:22 PM    HDL Cholesterol 40 09/10/2014 12:22 PM    LDL, calculated 115 (H) 09/10/2014 12:22 PM    Triglyceride 154 (H) 09/10/2014 12:22 PM    CHOL/HDL Ratio 3.5 04/21/2009 12:00 PM     Lab Results   Component Value Date/Time    GFR est non-AA >60 12/09/2015 12:00 PM    GFR est AA >60 12/09/2015 12:00 PM    Creatinine 0.95 12/09/2015 12:00 PM    BUN 9 12/09/2015 12:00 PM    Sodium 139 12/09/2015 12:00 PM    Potassium 3.9 12/09/2015 12:00 PM    Chloride 105 12/09/2015 12:00 PM    CO2 26 12/09/2015 12:00 PM     No results found for: TSH, TSH2, TSH3, TSHP, TSHELE, TSHEXT, TT3, T3U, T3UP, FRT3, FT3, FT4, FT4P, T4, T4P, FT4T, TT7, TSHEXT      Review of Systems   Constitutional: Negative for chills and fever. HENT: Negative for congestion and nosebleeds. Eyes: Negative for blurred vision and pain. Respiratory: Negative for cough, shortness of breath and wheezing. Cardiovascular: Negative for chest pain and leg swelling. Gastrointestinal: Negative for constipation, diarrhea, nausea and vomiting. Genitourinary: Negative for dysuria and frequency. Musculoskeletal: Negative for joint pain and myalgias. Skin: Negative for itching and rash. Neurological: Negative for dizziness, loss of consciousness and headaches. Psychiatric/Behavioral: Negative for depression. The patient is not nervous/anxious and does not have insomnia. Physical Exam  Vitals signs and nursing note reviewed. Constitutional:       Appearance: She is well-developed. HENT:      Head: Normocephalic and atraumatic. Eyes:      Conjunctiva/sclera: Conjunctivae normal.      Pupils: Pupils are equal, round, and reactive to light. Neck:      Thyroid: No thyromegaly. Vascular: No JVD. Cardiovascular:      Rate and Rhythm: Normal rate and regular rhythm. Heart sounds: Normal heart sounds. No murmur. No friction rub. No gallop. Pulmonary:      Effort: Pulmonary effort is normal. No respiratory distress. Breath sounds: Normal breath sounds. No stridor. No wheezing or rales. Abdominal:      General: Bowel sounds are normal. There is no distension. Palpations: Abdomen is soft. There is no mass. Tenderness: There is no abdominal tenderness. Musculoskeletal: Normal range of motion. General: No tenderness. Lymphadenopathy:      Cervical: No cervical adenopathy. Skin:     Findings: No erythema or rash. Neurological:      Mental Status: She is alert and oriented to person, place, and time. Cranial Nerves: No cranial nerve deficit. Deep Tendon Reflexes: Reflexes are normal and symmetric. Psychiatric:         Behavior: Behavior normal.         ASSESSMENT and PLAN  Diagnoses and all orders for this visit:    1. Left foot pain  -     URIC ACID; Future  -     nortriptyline (PAMELOR) 25 mg capsule; Take 1 Cap by mouth nightly.  -     REFERRAL TO PHYSICAL THERAPY    2. Advice given about COVID-19 virus infection    3.  Gestational HTN, third trimester        Patient elevation of blood pressure could be the result of uncontrolled pain patient was also in the argument on the phone with the boyfriend regarding family matters which could be also situational patient was told to continue blood pressure monitoring at home call if the blood pressure reading more than 140/90 or less than 90/60 reevaluate in 2 to 3 weeks patient acknowledged understanding agree with today's recommendation

## 2020-12-25 NOTE — ED PROVIDER NOTES
Atraumatic toe pain for the last week. Area has become more red and swollen. Denies trauma, fever      Toe Pain   The quality of the pain is described as aching. Pertinent negatives include no numbness, full range of motion, no stiffness, no tingling, no itching, no back pain and no neck pain. 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    HX GYN  3-    birth    HX OTHER SURGICAL      Dysplasia         Family History:   Problem Relation Age of Onset    Cancer Mother     Diabetes Father     Other Father         Aneursym    Migraines Sister        Social History     Socioeconomic History    Marital status: SINGLE     Spouse name: Not on file    Number of children: Not on file    Years of education: Not on file    Highest education level: Not on file   Occupational History    Not on file   Social Needs    Financial resource strain: Not on file    Food insecurity     Worry: Not on file     Inability: Not on file    Transportation needs     Medical: Not on file     Non-medical: Not on file   Tobacco Use    Smoking status: Current Every Day Smoker     Packs/day: 0.50     Years: 14.00     Pack years: 7.00     Types: Cigarettes     Start date: 2006    Smokeless tobacco: Never Used   Substance and Sexual Activity    Alcohol use:  Yes     Alcohol/week: 0.0 standard drinks     Comment: social    Drug use: Yes     Comment: smoked  puffs today    Sexual activity: Yes     Partners: Male   Lifestyle    Physical activity     Days per week: Not on file     Minutes per session: Not on file    Stress: Not on file   Relationships    Social connections     Talks on phone: Not on file     Gets together: Not on file     Attends Religion service: Not on file     Active member of club or organization: Not on file     Attends meetings of clubs or organizations: Not on file     Relationship status: Not on file    Intimate partner violence     Fear of current or ex partner: Not on file     Emotionally abused: Not on file     Physically abused: Not on file     Forced sexual activity: Not on file   Other Topics Concern    Not on file   Social History Narrative    Not on file         ALLERGIES: Codeine, Hydrocodone, Lortab [hydrocodone-acetaminophen], and Tramadol    Review of Systems   Constitutional: Negative. Negative for chills, fever and unexpected weight change. HENT: Negative. Negative for congestion and trouble swallowing. Eyes: Negative for discharge. Respiratory: Negative. Negative for cough, chest tightness and shortness of breath. Cardiovascular: Negative. Negative for chest pain. Gastrointestinal: Negative. Negative for abdominal distention, abdominal pain, constipation, diarrhea and nausea. Endocrine: Negative. Genitourinary: Negative. Negative for difficulty urinating, dysuria, frequency and urgency. Musculoskeletal: Positive for arthralgias. Negative for back pain, myalgias, neck pain and stiffness. Skin: Positive for color change. Negative for itching. Allergic/Immunologic: Negative. Neurological: Negative. Negative for dizziness, tingling, speech difficulty, numbness and headaches. Hematological: Negative. Psychiatric/Behavioral: Negative. Negative for agitation and confusion. All other systems reviewed and are negative.       Vitals:    12/18/20 0606   BP: (!) 159/106   Pulse: 94   Resp: 20   Temp: 98.4 °F (36.9 °C)   SpO2: 100%   Weight: 82.1 kg (181 lb)   Height: 5' 2\" (1.575 m)            Physical Exam  Vitals signs and nursing note reviewed. Constitutional:       Appearance: She is well-developed. HENT:      Head: Normocephalic and atraumatic. Eyes:      Conjunctiva/sclera: Conjunctivae normal.   Neck:      Musculoskeletal: Neck supple. Cardiovascular:      Rate and Rhythm: Normal rate and regular rhythm. Pulmonary:      Effort: Pulmonary effort is normal. No respiratory distress. Abdominal:      Palpations: Abdomen is soft. Tenderness: There is no abdominal tenderness. Musculoskeletal: Normal range of motion. General: No deformity. Feet:    Feet:      Comments: Pain, erythema, warmth and mild edema overlying MTP of third, fourth, and fifth left toes. Mild proximal extension of erythema and edema    Good cap refill and sensation of distal toes. Skin:     General: Skin is warm and dry. Neurological:      Mental Status: She is alert and oriented to person, place, and time. Psychiatric:         Behavior: Behavior normal.         Thought Content: Thought content normal.          MDM  Number of Diagnoses or Management Options  Inflammatory arthropathy  Diagnosis management comments: Gout or other inflammatory arthritis, cellulitis, occult trauma    ED Course as of Dec 25 0107   Fri Dec 18, 2020   8248 Patient feeling better. X-ray negative. Will DC home with medicine for potential gout/inflammatory process. Counseled patient to return for fevers or worsening/spreading redness in which case she may require antibiotics for cellulitis. No current indication of cellulitis or abscess. [SS]      ED Course User Index  [SS] Leora Nichole MD       Procedures    LABORATORY TESTS:  No results found for this or any previous visit (from the past 12 hour(s)). IMAGING RESULTS:  XR FOOT LT MIN 3 V   Final Result   IMPRESSION: No acute fracture or dislocation.           MEDICATIONS GIVEN:  Medications   colchicine tablet 1.2 mg (1.2 mg Oral Given 12/18/20 0644)   indomethacin (INDOCIN) capsule 75 mg (75 mg Oral Given 12/18/20 0644)   oxyCODONE-acetaminophen (PERCOCET) 5-325 mg per tablet 2 Tab (2 Tabs Oral Given 12/18/20 0644)       IMPRESSION:  1. Inflammatory arthropathy        PLAN:  1. Discharge Medication List as of 12/18/2020  7:15 AM      START taking these medications    Details   indomethacin (INDOCIN) 25 mg capsule Take 1 Cap by mouth three (3) times daily for 7 days. With food, Normal, Disp-21 Cap, R-0      oxyCODONE-acetaminophen (Percocet) 5-325 mg per tablet Take 1 Tab by mouth every eight (8) hours as needed for Pain for up to 3 days. Max Daily Amount: 3 Tabs. Indications: pain, Normal, Disp-5 Tab, R-0      colchicine 0.6 mg tablet Take 1 Tab by mouth daily for 2 days. , Normal, Disp-2 Tab, R-0         CONTINUE these medications which have NOT CHANGED    Details   labetaloL (NORMODYNE) 300 mg tablet TAKE 1 TABLET BY MOUTH THREE TIMES A DAY, Print, Disp-90 Tab,R-2      DULoxetine (CYMBALTA) 30 mg capsule Take 1 Cap by mouth daily. , Print, Disp-30 Cap,R-3      lamoTRIgine (LaMICtal) 100 mg tablet Take 1 Tab by mouth two (2) times a day., Print, Disp-60 Tab,R-3      QUEtiapine (SEROquel) 25 mg tablet Take 1-2 tablets by mouth at bedtime as needed for anxiety/agitation/sleep, Print, Disp-60 Tab,R-3      clonazePAM (KlonoPIN) 1 mg tablet Take 1 Tab by mouth two (2) times daily as needed for Anxiety or Sleep. Max Daily Amount: 2 mg., Print, Disp-60 Tab,R-2      loratadine (CLARITIN) 10 mg tablet TAKE 1 TABLET BY MOUTH DAILY AS NEEDED FOR ALLERGIES., Normal, Disp-30 Tab,R-1      albuterol (PROVENTIL HFA, VENTOLIN HFA, PROAIR HFA) 90 mcg/actuation inhaler Take 1 Puff by inhalation every four (4) hours as needed for Wheezing., Normal, Disp-1 Inhaler, R-3           2.    Follow-up Information     Follow up With Specialties Details Why Contact Info    Panchito Bowman MD Family Medicine Schedule an appointment as soon as possible for a visit  [address] [place] [address]       [facility] EMERGENCY DEPT Emergency Medicine  As needed, If symptoms worsen [address] [phone]        Return to ED if worse

## 2021-01-05 LAB — URATE SERPL-MCNC: 4.9 MG/DL (ref 2.6–6.2)

## 2021-01-19 ENCOUNTER — HOSPITAL ENCOUNTER (OUTPATIENT)
Dept: PHYSICAL THERAPY | Age: 36
Discharge: HOME | End: 2021-01-19
Payer: MEDICAID

## 2021-01-19 PROCEDURE — 97162 PT EVAL MOD COMPLEX 30 MIN: CPT | Performed by: PHYSICAL THERAPIST

## 2021-01-19 NOTE — PROGRESS NOTES
[facility] [address]. Physical Therapy  [address] [place]  Phone: [phone] Fax: [phone]    Plan of Care/Statement of Necessity for Physical Therapy Services  2-15    Patient name: Subha Solano  : 1985  Provider#: [id number]  Referral source: Kristel Duong MD      Medical/Treatment Diagnosis: Left foot pain [M79.672]     Prior Hospitalization: see medical history     Comorbidities: allergies, anxiety, asthma, BMI over 30, depression, HTN  Prior Level of Function: 20 min of exercise seldom or never  Medications: Verified on Patient Summary List    Start of Care: 22      Onset Date: 2020       The [address] [place] [address] and following information is based on the information from the initial evaluation. Assessment/ key information: The patient presents with a chief complaint of left lateral foot pain that started late 2020, shortly after she gave birth to her daughter on 20. The patient has no history of a fall, trip, sprain, or impact injury. She is very tender to palpate 3-5th metatarsals, and is slightly swollen in this area. She has significantly decreased muscle activation with ankle dorsiflexion and small toe flexion. She does report some back pain during pregnancy that would radiate upwards, but not down her legs. Recent uric acid levels to rule in/out gout were 4.9mg/dL, which is within normal limits. The patient may have a type of neuralgia issue, and her underlying pes planus and decreased ROM and strength exacerbate her symptoms. The patient will benefit from guided therapeutic interventions such as therex for strengthening and neuromuscular re-eduction, manual techniques for joint mobility and soft tissue extensibility, and modalities for pain management in order to improve functional mobility with daily activities.      Evaluation Complexity History MEDIUM  Complexity : 1-2 comorbidities / personal factors will impact the outcome/ POC ; Examination MEDIUM Complexity : 3 Standardized tests and measures addressing body structure, function, activity limitation and / or participation in recreation  ;Presentation MEDIUM Complexity : Evolving with changing characteristics  ; Clinical Decision Making MEDIUM Complexity : FOTO score of 26-74  Overall Complexity Rating: MEDIUM    Problem List: pain affecting function, decrease ROM, decrease strength, edema affecting function, impaired gait/ balance, decrease ADL/ functional abilitiies, decrease activity tolerance, decrease flexibility/ joint mobility and decrease transfer abilities   Treatment Plan may include any combination of the following: Therapeutic exercise, Therapeutic activities, Neuromuscular re-education, Physical agent/modality, Gait/balance training, Manual therapy, Patient education, Self Care training, Functional mobility training, Home safety training and Stair training  Patient / Family readiness to learn indicated by: asking questions, trying to perform skills and interest  Persons(s) to be included in education: patient (P)  Barriers to Learning/Limitations: None  Patient Goal (s): improving my foot  Patient Self Reported Health Status: fair  Rehabilitation Potential: fair/good    Short Term Goals: To be accomplished in 2 treatments:                         1.) The patient will be independent with their HEP consistently for at least one week. Long Term Goals: To be accomplished in 16 treatments:                         1.) The patient will have at most 5/10 pain with daily activities. 2.) The patient will be able to ambulate for at least 20min without having to change positions due to pain. 3.) The patient will improve their FOTO score from 47 to at least 55 to show improvements in functional mobility. Frequency / Duration: Patient to be seen 2 times per week for 16 treatments.       Patient/ Caregiver education and instruction: self care, activity modification, brace/ splint application and exercises    [x]  Plan of care has been reviewed with EZEKIEL Porter, PT 1/19/2021     ________________________________________________________________________    I certify that the above Therapy Services are being furnished while the patient is under my care. I agree with the treatment plan and certify that this therapy is necessary.     [de-identified] Signature:____________________  Date:____________Time: _________

## 2021-01-19 NOTE — PROGRESS NOTES
PT INITIAL EVALUATION NOTE 2-15    Patient Name: Walter Flores  Date:2021  : 1985  [x]  Patient  Verified  Payor: [address] / Plan: [facility] / Product Type: Managed Care Medicaid /    In time: 1:50pm  Out time: 2:35pm  Total Treatment Time (min): 45  Visit #: 1     Treatment Area: Left foot pain [M79.462]    SUBJECTIVE  Pain Level (0-10 scale): 7 (5-10/10)  Any medication changes, allergies to medications, adverse drug reactions, diagnosis change, or new procedure performed?: [] No    [x] Yes (see summary sheet for update)  Subjective: The patient has had left foot pain after she had her daughter around 2020. She didn't have much swollen feet during pregnancy. She went to the ER and they thought she might have gout. She had a uric acid test recently, it was a 4.9 (<6.0 is therapeutic range for gout). She can be on her feet for 15-30 minutes. It will sometimes wake her up at night. She does report a history of back discomfort throughout her pregnancy.      OBJECTIVE/EXAMINATION    Posture:  Scapular protraction bilaterally  Other Observations:  SLS: R= 30s; L= 6s  Gait and Functional Mobility:  Antalgic, overpronates bilaterally (pes planus)  Palpation: very tender to palpate 3-5th metatarsals    A/PROM:   Right   Left   Flexion /DF  10/15   -5/5   Extension/PF  65/70   50/55    Joint Mobility Assessment: hypomobile on left    Flexibility: tight calves    LOWER QUARTER   MUSCLE STRENGTH  KEY       R  L  0 - No Contraction  Knee ext  5  5  1 - Trace            flex  5  5  2 - Poor   Hip ext   nt  nt  3 - Fair          flex   5  5  4 - Good         abd  3+  3+  5 - Normal         add  nt  nt      Ankle DF  5  3+                PF  5  3+                INV  5  4-                EV  5  3+      MMT: nt  Neurological: Reflexes / Sensations: nt    7 min Therapeutic Exercise:  [x] See flow sheet :   Rationale: increase ROM, increase strength and improve coordination to improve the patients ability to perform daily activities.            With   [] TE   [] TA   [] Neuro   [] SC   [] other: Patient Education: [x] Review HEP    [] Progressed/Changed HEP based on:   [] positioning   [] body mechanics   [] transfers   [] heat/ice application    [] other:        Other Objective/Functional Measures: FOTO= 47    Pain Level (0-10 scale) post treatment: 7      ASSESSMENT:      [x]  See Plan of [facility], PT 1/19/2021

## 2021-01-29 ENCOUNTER — VIRTUAL VISIT (OUTPATIENT)
Dept: FAMILY MEDICINE CLINIC | Age: 36
End: 2021-01-29
Payer: MEDICAID

## 2021-01-29 DIAGNOSIS — F32.A ANXIETY AND DEPRESSION: ICD-10-CM

## 2021-01-29 DIAGNOSIS — F41.9 ANXIETY AND DEPRESSION: ICD-10-CM

## 2021-01-29 DIAGNOSIS — R53.83 OTHER FATIGUE: ICD-10-CM

## 2021-01-29 DIAGNOSIS — R53.83 LOW ENERGY: ICD-10-CM

## 2021-01-29 DIAGNOSIS — O13.3 GESTATIONAL HTN, THIRD TRIMESTER: Primary | ICD-10-CM

## 2021-01-29 DIAGNOSIS — F31.81 BIPOLAR 2 DISORDER, MAJOR DEPRESSIVE EPISODE (HCC): ICD-10-CM

## 2021-01-29 PROCEDURE — 99214 OFFICE O/P EST MOD 30 MIN: CPT | Performed by: FAMILY MEDICINE

## 2021-01-29 NOTE — LETTER
AGREEMENT for controlled medication treatment Ronald Valenzuela, have agreed to a course of treatment that includes taking controlled medication. For this treatment I designate _____________________________________ as the [facility] Provider.  The purpose of this agreement is to prevent misunderstandings about controlled medications I may be taking for treatment, and to comply with applicable laws. I understand this agreement is essential to the trust and confidence necessary in a provider-patient relationship and that the designated provider will treat me in accordance with the statements below. As part of my treatment I agree to the followin.  USE 
a. I will take the controlled medication as instructed by the designated provider and avoid improper use of controlled medications. b.   I will not share, sell or trade controlled medication with anyone as this is a criminal offense.  
c.   I will not use any illegal controlled substance, including marijuana, cocaine, etc. as this is a criminal offense. 2.  PROVIDERS 
a. I will only obtain controlled medication from the designated provider. b.   I will not attempt to obtain the same or similar controlled medications, such as opioid pain medication, stimulants, anti-anxiety or hypnotics from any other provider as this is a criminal offense. 
c.   I will inform the designated provider about all other licensed professionals providing medical care to me and authorize communication between all providers to coordinate care, particularly prescribing or dispensing of controlled medications. 3.  PHARMACY 
a.   I will only fill controlled medication prescriptions at the approved pharmacy as listed below. 4.  OFFICE VISITS 
a. I agree to attend scheduled office visit appointments. b.   I am aware my office visits may be monthly or as determined necessary by the designated provider. c.   I will communicate fully with the designated provider about the character and intensity of my symptoms, the effect of the symptoms on my daily life, and how well the controlled medication is helping to relieve the cause of my symptoms. 5.  REFILLS OR CALL-IN PRESCRIPTIONS OF CONTROLLED MEDICATIONS 
a. I agree that refills of my prescriptions for controlled medications will be made at the time of an office visit during regular office hours. No refills will be available during evenings or on weekends. Abusive or inappropriate behavior related to medication refills will not be tolerated. b.   I will not call the office repeatedly to inquire about my controlled medication. I understand that medications will be written on the due date and not before. Calling the office repeatedly will be considered harassment, and I may be discharged from the practice. c.   Controlled medications may not be called in for refill, but doing so is at the discretion of the designated provider. d.   I am aware that only I must  prescriptions for controlled medications at the office but the designated provider may allow a designee to  the prescription from the office under very specific circumstance that may develop. 6.  TOXICOLOGY SCREENING 
a. I agree to random urine toxicology screenings in order to comply with government and [address] [place] [address] regulations. I understand that I will be financially responsible for any charges incurred for the urine toxicology screening, which may not be covered by my insurance. Failure to do so will be considered non-compliance and I may be discharged. b. In some cases an oral swab or hair sample may be substituted for a urine screen. This is at the discretion of the designated provider. I understand that I will be financially responsible for any charges incurred as well. c.   I understand that if the urine toxicology does not show my medications prescribed to me by the designated provider, or it shows any illegal substance or any other medications NOT prescribed by the designated providers, I may be discharged from this practice at the discretion of the designated provider and no further controlled medications will be prescribed or follow-up appointments scheduled. Also, if any illegal substances are detected on the urine toxicology, this information may be provided to local law enforcement. 7. PILL COUNTS 
a. I am aware I may be called at any time to come into the office for a count of all my remaining controlled medications in order to help the designated provider understand the rate at which I use my controlled medications and to more effectively adjust dosage. b. I agree that I will use my medication at a rate NO greater than the prescribed rate and that use of my medication at a greater rate will result in my being without medication for the period of time until next expected due date. c. I will bring in the containers with the medication prescribed by all providers, including the designated provider, to each office visit even if there is no medication remaining. All controlled medication will be in the original containers from the pharmacy for each medication. Failure to do so will be considered non-compliance and I may be discharged from the practice. 8.  LOSS OR THEFT OF MEDICATION 
a. I will safeguard my controlled medication from loss or theft. b.   Lost or stolen controlled medication may not be replaced. This includes a prescription that has not yet been filled at the pharmacy. c.   In the event my controlled medications are stolen or lost, I will notify the designated providers office immediately. If such event occurs during the night, weekend or holiday, I will leave a detailed message on the answering machine or answering service at the number listed above. 
d.   I will file and produce an official police report for any effort to replace controlled medications prescribed. 9.  AGENCY COLLABORATION I authorize the designated provider and the authorized pharmacy/pharmacist to cooperate fully with any city, state, or federal law enforcement agency in the investigation of any possible misuse, sale or other diversion of my controlled medication. 10.  TREATMENT I understand that if I violate any of the conditions, my controlled medication and/or treatment will be terminated. If the violation involves obtaining controlled substances and/or dangerous drugs from another source, the incident may be reported to other medical facilities and authorities, including law enforcement. In this case, the designated provider may taper off the medication over a period of several days, as necessary, to avoid withdrawal symptoms or will suggest alternate treatment facilities. Also, a drug dependence treatment program may be recommended. 11.  AGREEMENT I agree to follow these guidelines that have been fully explained to me. All of my questions and concerns regarding treatment have been adequately answered. A copy of this document has been given to me. I agree to use ______________________________ Authorized Pharmacy located at _________________________________________________________________ Telephone ________________________________for filling ALL controlled medication prescriptions. This agreement is entered into on 1/29/2021 Patient signature__________________________________________________________ Legal Guardian signature___________________________________________________ Provider signature_________________________________________________________ Witness signature_________________________________________________________

## 2021-01-29 NOTE — PROGRESS NOTES
Chief Complaint   Patient presents with    Medication Evaluation     1. Have you been to the ER, urgent care clinic since your last visit? Hospitalized since your last visit? No    2. Have you seen or consulted any other health care providers outside of the [address] [place] [address] since your last visit? Include any pap smears or colon screening. No    Call placed to pt. Verified patient with two type of identifiers.   requesting to increase clonazepam to three times daily due to increase in stress of new baby

## 2021-01-29 NOTE — PROGRESS NOTES
HISTORY OF PRESENT ILLNESS  Shelly Scott is a 30 y.o. female. Pt's main concerns were provided on virtual visit, a telemed format,  pt is w/ comorbid medical history and unaware of been exposed to covid-19 individual, Pt Have been staying at home for couple of wks,  pt has no fever no cough no dyspnea,    Patient present with depression, the anxiety, tired lack of sleep and panicky states of mind, stating that Xanax has helped the current serious medical condition and the tremor, she needs refill, tried to come off but became unstable take it as needed, has only 2 tabs left at this time, aware of its side effects and dependency,     Patient's current medical condition is not controlled without medications, not able to tolerate any SSRI or other recommended antidepressive or antianxiety meds except for the current RANJIT enhancers, recently given Lorazepam b/c of recall not helping and unable to sleep  Patient state that it isnot  getting better: pt states and reports of feeling less anxius, less guilty feeling,  less Hoplessness, ns/nh,ni,nh, less trouble with weight gain or loss, no tendency of etoh or illicit drug use, more ability of sleep, more ablitiy  to concentrate at home with the current medications,and all together a safe feeling at home       Not breast feeding having a few tabs left, having few tabs of clonazepam, on labetalol, on Cymbalta, Seroquel, Lamictal, having a  has been trying to see a psych and so far not able to do so, has been on such meds for many yrs having a new kid, is 2 months old,     Current Outpatient Medications   Medication Sig Dispense Refill    diclofenac (VOLTAREN) 1 % gel Apply 4 g to affected area four (4) times daily. 100 g 3    labetaloL (NORMODYNE) 300 mg tablet TAKE 1 TABLET BY MOUTH THREE TIMES A DAY 90 Tab 2    DULoxetine (CYMBALTA) 30 mg capsule Take 1 Cap by mouth daily.  30 Cap 3    lamoTRIgine (LaMICtal) 100 mg tablet Take 1 Tab by mouth two (2) times a day. 60 Tab 3    QUEtiapine (SEROquel) 25 mg tablet Take 1-2 tablets by mouth at bedtime as needed for anxiety/agitation/sleep 60 Tab 3    clonazePAM (KlonoPIN) 1 mg tablet Take 1 Tab by mouth two (2) times daily as needed for Anxiety or Sleep. Max Daily Amount: 2 mg. 60 Tab 2    loratadine (CLARITIN) 10 mg tablet TAKE 1 TABLET BY MOUTH DAILY AS NEEDED FOR ALLERGIES. 30 Tab 1    albuterol (PROVENTIL HFA, VENTOLIN HFA, PROAIR HFA) 90 mcg/actuation inhaler Take 1 Puff by inhalation every four (4) hours as needed for Wheezing. 1 Inhaler 3    nortriptyline (PAMELOR) 25 mg capsule Take 1 Cap by mouth nightly. 30 Cap 1     Allergies   Allergen Reactions    Codeine Other (comments)     Hallucinations, raise blood pressure     Hydrocodone Hives    Lortab [Hydrocodone-Acetaminophen] Hives and Itching    Tramadol Nausea Only     Headache     Past Medical History:   Diagnosis Date    Anxiety disorder     Arthritis     Asthma     Bipolar 1 disorder (HCC)     Depression     Frequent headaches     Frequent headaches     Hypertension     Migraine     Muscle pain     Other ill-defined conditions(799.89)     dysplagia    Psychiatric disorder     bipolar, anxiety, depression, PTSD    PTSD (post-traumatic stress disorder)      Past Surgical History:   Procedure Laterality Date    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    HX GYN  3-    birth   [facility] HX OTHER SURGICAL      Dysplasia     Family History   Problem Relation Age of Onset    Cancer Mother     Diabetes Father     Other Father         Aneursym   [facility] Migraines Sister      Social History     Tobacco Use    Smoking status: Current Every Day Smoker     Packs/day: 0.50     Years: 14.00     Pack years: 7.00     Types: Cigarettes     Start date: 2006    Smokeless tobacco: Never Used   Substance Use Topics    Alcohol use:  Yes     Alcohol/week: 0.0 standard drinks     Comment: social      Lab Results   Component Value Date/Time Cholesterol, total 186 09/10/2014 12:22 PM    HDL Cholesterol 40 09/10/2014 12:22 PM    LDL, calculated 115 (H) 09/10/2014 12:22 PM    Triglyceride 154 (H) 09/10/2014 12:22 PM    CHOL/HDL Ratio 3.5 04/21/2009 12:00 PM     Lab Results   Component Value Date/Time    GFR est non-AA >60 12/09/2015 12:00 PM    GFR est AA >60 12/09/2015 12:00 PM    Creatinine 0.95 12/09/2015 12:00 PM    BUN 9 12/09/2015 12:00 PM    Sodium 139 12/09/2015 12:00 PM    Potassium 3.9 12/09/2015 12:00 PM    Chloride 105 12/09/2015 12:00 PM    CO2 26 12/09/2015 12:00 PM        Review of Systems   Constitutional: Positive for malaise/fatigue. Negative for chills and fever. HENT: Negative for nosebleeds. Eyes: Negative for pain. Respiratory: Negative for cough and wheezing. Cardiovascular: Negative for chest pain and leg swelling. Gastrointestinal: Negative for constipation, diarrhea and nausea. Genitourinary: Negative for frequency. Musculoskeletal: Negative for joint pain and myalgias. Skin: Negative for rash. Neurological: Negative for loss of consciousness. Endo/Heme/Allergies: Does not bruise/bleed easily. Psychiatric/Behavioral: Positive for depression. The patient is nervous/anxious and has insomnia. All other systems reviewed and are negative. Physical Exam  Constitutional:       Appearance: She is obese. She is not ill-appearing or toxic-appearing. HENT:      Head: Normocephalic and atraumatic. Mouth/Throat:      Mouth: Mucous membranes are moist.   Neurological:      Mental Status: She is alert and oriented to person, place, and time. Psychiatric:         Attention and Perception: She is inattentive. She does not perceive auditory or visual hallucinations. Mood and Affect: Mood normal. Mood is not anxious, depressed or elated. Affect is flat. Affect is not labile, blunt, angry, tearful or inappropriate. Speech: She is communicative. Speech is rapid and pressured.  Speech is not delayed, slurred or tangential.         Behavior: Behavior is slowed. Behavior is not agitated, aggressive, withdrawn, hyperactive or combative. Thought Content: Thought content normal. Thought content is not paranoid. Thought content does not include homicidal or suicidal ideation. Cognition and Memory: Memory is not impaired. She does not exhibit impaired recent memory or impaired remote memory. Judgment: Judgment normal. Judgment is not inappropriate. ASSESSMENT and PLAN  Diagnoses and all orders for this visit:    1. Gestational HTN, third trimester  -     REFERRAL TO PSYCHIATRY  -     10-DRUG SCREEN W/CONF; Future  -     DRUG SCREENING BENZODIAZEPINES 1-12; Future  -     CBC WITH AUTOMATED DIFF; Future  -     METABOLIC PANEL, COMPREHENSIVE; Future  -     LIPID PANEL; Future  -     TSH 3RD GENERATION; Future  -     HEMOGLOBIN A1C WITH EAG; Future    2. Anxiety and depression  -     REFERRAL TO PSYCHIATRY  -     10-DRUG SCREEN W/CONF; Future  -     DRUG SCREENING BENZODIAZEPINES 1-12; Future  -     CBC WITH AUTOMATED DIFF; Future  -     METABOLIC PANEL, COMPREHENSIVE; Future  -     LIPID PANEL; Future  -     TSH 3RD GENERATION; Future  -     HEMOGLOBIN A1C WITH EAG; Future    3. Bipolar 2 disorder, major depressive episode ([facility] [address]  -     REFERRAL TO PSYCHIATRY  -     10-DRUG SCREEN W/CONF; Future  -     DRUG SCREENING BENZODIAZEPINES 1-12; Future  -     CBC WITH AUTOMATED DIFF; Future  -     METABOLIC PANEL, COMPREHENSIVE; Future  -     LIPID PANEL; Future  -     TSH 3RD GENERATION; Future  -     HEMOGLOBIN A1C WITH EAG; Future    4. Other fatigue  -     REFERRAL TO PSYCHIATRY  -     10-DRUG SCREEN W/CONF; Future  -     DRUG SCREENING BENZODIAZEPINES 1-12; Future  -     CBC WITH AUTOMATED DIFF; Future  -     METABOLIC PANEL, COMPREHENSIVE; Future  -     LIPID PANEL; Future  -     TSH 3RD GENERATION; Future  -     HEMOGLOBIN A1C WITH EAG; Future    5.  Low energy  -     REFERRAL TO PSYCHIATRY  -     10-DRUG SCREEN W/CONF; Future  -     DRUG SCREENING BENZODIAZEPINES 1-12; Future  -     CBC WITH AUTOMATED DIFF; Future  -     METABOLIC PANEL, COMPREHENSIVE; Future  -     LIPID PANEL; Future  -     TSH 3RD GENERATION; Future  -     HEMOGLOBIN A1C WITH EAG;  Future          Patient was told that the medication is not safe was told not to operate any machinery while taking the medication meanwhile emphasized that the pt should take the medication as needed not on a regular basis avoid alcohol intake and avoid other medication that could potentiate its effect, regardless patient was told any other medication given by any other doctor the pt need to call primary care for further advice` patient acknowledged understanding and agreed with today's recommendation

## 2021-02-14 DIAGNOSIS — L02.91 ABSCESS: Primary | ICD-10-CM

## 2021-03-02 ENCOUNTER — OFFICE VISIT (OUTPATIENT)
Dept: FAMILY MEDICINE CLINIC | Age: 36
End: 2021-03-02
Payer: MEDICAID

## 2021-03-02 VITALS
BODY MASS INDEX: 35.98 KG/M2 | WEIGHT: 195.5 LBS | HEART RATE: 89 BPM | TEMPERATURE: 98.1 F | SYSTOLIC BLOOD PRESSURE: 156 MMHG | RESPIRATION RATE: 16 BRPM | DIASTOLIC BLOOD PRESSURE: 96 MMHG | HEIGHT: 62 IN | OXYGEN SATURATION: 100 %

## 2021-03-02 DIAGNOSIS — I10 ESSENTIAL HYPERTENSION: ICD-10-CM

## 2021-03-02 DIAGNOSIS — F31.81 BIPOLAR 2 DISORDER, MAJOR DEPRESSIVE EPISODE (HCC): Primary | ICD-10-CM

## 2021-03-02 DIAGNOSIS — J45.909 ASTHMA: ICD-10-CM

## 2021-03-02 DIAGNOSIS — F41.9 ANXIETY AND DEPRESSION: ICD-10-CM

## 2021-03-02 DIAGNOSIS — M79.672 LEFT FOOT PAIN: ICD-10-CM

## 2021-03-02 DIAGNOSIS — F32.A ANXIETY AND DEPRESSION: ICD-10-CM

## 2021-03-02 PROCEDURE — 99214 OFFICE O/P EST MOD 30 MIN: CPT | Performed by: FAMILY MEDICINE

## 2021-03-02 RX ORDER — QUETIAPINE FUMARATE 25 MG/1
TABLET, FILM COATED ORAL
Qty: 60 TAB | Refills: 3 | Status: SHIPPED | OUTPATIENT
Start: 2021-03-02 | End: 2021-05-25 | Stop reason: SDUPTHER

## 2021-03-02 RX ORDER — CLONAZEPAM 1 MG/1
1 TABLET ORAL
Qty: 60 TAB | Refills: 2 | Status: SHIPPED | OUTPATIENT
Start: 2021-03-02 | End: 2021-07-27 | Stop reason: SDUPTHER

## 2021-03-02 RX ORDER — ALBUTEROL SULFATE 90 UG/1
1 AEROSOL, METERED RESPIRATORY (INHALATION)
Qty: 1 INHALER | Refills: 3 | Status: SHIPPED | OUTPATIENT
Start: 2021-03-02

## 2021-03-02 RX ORDER — NORTRIPTYLINE HYDROCHLORIDE 25 MG/1
25 CAPSULE ORAL
Qty: 30 CAP | Refills: 2 | Status: SHIPPED | OUTPATIENT
Start: 2021-03-02

## 2021-03-02 RX ORDER — DICLOFENAC SODIUM 10 MG/G
4 GEL TOPICAL 4 TIMES DAILY
Qty: 100 G | Refills: 3 | Status: SHIPPED | OUTPATIENT
Start: 2021-03-02

## 2021-03-02 RX ORDER — LORATADINE 10 MG/1
TABLET ORAL
Qty: 30 TAB | Refills: 1 | Status: SHIPPED | OUTPATIENT
Start: 2021-03-02

## 2021-03-02 RX ORDER — DULOXETIN HYDROCHLORIDE 30 MG/1
30 CAPSULE, DELAYED RELEASE ORAL DAILY
Qty: 30 CAP | Refills: 3 | Status: SHIPPED | OUTPATIENT
Start: 2021-03-02 | End: 2021-05-25 | Stop reason: SDUPTHER

## 2021-03-02 RX ORDER — LAMOTRIGINE 100 MG/1
100 TABLET ORAL 2 TIMES DAILY
Qty: 60 TAB | Refills: 3 | Status: SHIPPED | OUTPATIENT
Start: 2021-03-02 | End: 2021-05-25 | Stop reason: SDUPTHER

## 2021-03-02 RX ORDER — LABETALOL 300 MG/1
TABLET, FILM COATED ORAL
Qty: 90 TAB | Refills: 2 | Status: SHIPPED | OUTPATIENT
Start: 2021-03-02 | End: 2021-05-25 | Stop reason: ALTCHOICE

## 2021-03-02 RX ORDER — LABETALOL 100 MG/1
TABLET, FILM COATED ORAL
Qty: 30 TAB | Refills: 2 | Status: SHIPPED | OUTPATIENT
Start: 2021-03-02 | End: 2021-05-25 | Stop reason: ALTCHOICE

## 2021-03-02 NOTE — PROGRESS NOTES
HISTORY OF PRESENT ILLNESS  Mingo Burdick is a 30 y.o. female,   the anxiety and panic states of mind   Pt present with panic attacks, as the pt states there are sudden periods of intense tingling sensation of the left arm and legs, and then the fear that sometimes include palpitations, sweating, shaking, shortness of breath, numbness, and then pt feels like a feeling that something bad is going to happen. Pt states that these sometimes happens within minutes, usually  they last for about few min about 15-30 min but each time duration varies from seconds to hours, hence worsens by a fear of losing control and getting chest pain thinking that is either heart related or stroke level, pt   Pt not an Etoh nor drug abuses, is not asthmatic and has no hx of sudden death, nor early hx of heart dz in the family but recently lost few family member due to [place],     Gest HTN with preeclamsia in 3rd trimester,   Today pt present for Bp check and++ Compliancy w/ the bp meds, having had the low salt diet ,  has been active, patien does not obtain the bp at home ,, today the pt denies Chest Pain, has no legs swelling no lightheadedness,      Current Outpatient Medications   Medication Sig Dispense Refill    nortriptyline (PAMELOR) 25 mg capsule Take 1 Cap by mouth nightly. 30 Cap 1    diclofenac (VOLTAREN) 1 % gel Apply 4 g to affected area four (4) times daily. 100 g 3    labetaloL (NORMODYNE) 300 mg tablet TAKE 1 TABLET BY MOUTH THREE TIMES A DAY 90 Tab 2    DULoxetine (CYMBALTA) 30 mg capsule Take 1 Cap by mouth daily. 30 Cap 3    lamoTRIgine (LaMICtal) 100 mg tablet Take 1 Tab by mouth two (2) times a day. 60 Tab 3    QUEtiapine (SEROquel) 25 mg tablet Take 1-2 tablets by mouth at bedtime as needed for anxiety/agitation/sleep 60 Tab 3    clonazePAM (KlonoPIN) 1 mg tablet Take 1 Tab by mouth two (2) times daily as needed for Anxiety or Sleep.  Max Daily Amount: 2 mg. 60 Tab 2    loratadine (CLARITIN) 10 mg tablet TAKE 1 TABLET BY MOUTH DAILY AS NEEDED FOR ALLERGIES. 30 Tab 1    albuterol (PROVENTIL HFA, VENTOLIN HFA, PROAIR HFA) 90 mcg/actuation inhaler Take 1 Puff by inhalation every four (4) hours as needed for Wheezing. 1 Inhaler 3     Allergies   Allergen Reactions    Codeine Other (comments)     Hallucinations, raise blood pressure     Hydrocodone Hives    Lortab [Hydrocodone-Acetaminophen] Hives and Itching    Tramadol Nausea Only     Headache     Past Medical History:   Diagnosis Date    Anxiety disorder     Arthritis     Asthma     Bipolar 1 disorder (HCC)     Depression     Frequent headaches     Frequent headaches     Hypertension     Migraine     Muscle pain     Other ill-defined conditions(799.89)     dysplagia    Psychiatric disorder     bipolar, anxiety, depression, PTSD    PTSD (post-traumatic stress disorder)      Past Surgical History:   Procedure Laterality Date    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    HX GYN  3-    birth   25 [facility]"" HX OTHER SURGICAL      Dysplasia     Family History   Problem Relation Age of Onset    Cancer Mother     Diabetes Father     Other Father         Aneursym   25 \[facility]\"" Migraines Sister      Social History     Tobacco Use    Smoking status: Current Every Day Smoker     Packs/day: 0.50     Years: 14.00     Pack years: 7.00     Types: Cigarettes     Start date: 2006    Smokeless tobacco: Never Used   Substance Use Topics    Alcohol use: Yes     Alcohol/week: 0.0 standard drinks     Comment: social      Lab Results   Component Value Date/Time    WBC 6.3 12/09/2015 12:00 PM    HGB 11.5 12/09/2015 12:00 PM    HCT 34.9 (L) 12/09/2015 12:00 PM    PLATELET 858 41/81/0520 12:00 PM    MCV 88.8 12/09/2015 12:00 PM     No results found for: TSH, TSH2, TSH3, TSHP, TSHELE, TSHEXT, TT3, T3U, T3UP, FRT3, FT3, FT4, FT4P, T4, T4P, FT4T, TT7, TSHEXT      Review of Systems   Constitutional: Negative for chills and fever.    HENT: Negative for congestion and nosebleeds. Eyes: Negative for blurred vision and pain. Respiratory: Negative for cough, shortness of breath and wheezing. Cardiovascular: Negative for chest pain and leg swelling. Gastrointestinal: Negative for constipation, diarrhea, nausea and vomiting. Genitourinary: Negative for dysuria and frequency. Musculoskeletal: Negative for joint pain and myalgias. Skin: Negative for itching and rash. Neurological: Negative for dizziness, loss of consciousness and headaches. Psychiatric/Behavioral: Negative for depression. The patient is not nervous/anxious and does not have insomnia. Physical Exam  Vitals signs and nursing note reviewed. Constitutional:       Appearance: She is well-developed. HENT:      Head: Normocephalic and atraumatic. Eyes:      Conjunctiva/sclera: Conjunctivae normal.      Pupils: Pupils are equal, round, and reactive to light. Neck:      Thyroid: No thyromegaly. Vascular: No JVD. Cardiovascular:      Rate and Rhythm: Normal rate and regular rhythm. Heart sounds: Normal heart sounds. No murmur. No friction rub. No gallop. Pulmonary:      Effort: Pulmonary effort is normal. No respiratory distress. Breath sounds: Normal breath sounds. No stridor. No wheezing or rales. Abdominal:      General: Bowel sounds are normal. There is no distension. Palpations: Abdomen is soft. There is no mass. Tenderness: There is no abdominal tenderness. Musculoskeletal: Normal range of motion. General: No tenderness. Lymphadenopathy:      Cervical: No cervical adenopathy. Skin:     Findings: No erythema or rash. Neurological:      Mental Status: She is alert and oriented to person, place, and time. Cranial Nerves: No cranial nerve deficit. Deep Tendon Reflexes: Reflexes are normal and symmetric. Psychiatric:         Behavior: Behavior normal.         ASSESSMENT and PLAN  Diagnoses and all orders for this visit:    1.  Bipolar 2 disorder, major depressive episode (HCC)  -     DULoxetine (CYMBALTA) 30 mg capsule; Take 1 Cap by mouth daily. -     lamoTRIgine (LaMICtal) 100 mg tablet; Take 1 Tab by mouth two (2) times a day. -     QUEtiapine (SEROquel) 25 mg tablet; Take 1-2 tablets by mouth at bedtime as needed for anxiety/agitation/sleep  -     clonazePAM (KlonoPIN) 1 mg tablet; Take 1 Tab by mouth two (2) times daily as needed for Anxiety or Sleep. Max Daily Amount: 2 mg.  -     REFERRAL TO PSYCHIATRY    2. Anxiety and depression  -     DULoxetine (CYMBALTA) 30 mg capsule; Take 1 Cap by mouth daily. -     lamoTRIgine (LaMICtal) 100 mg tablet; Take 1 Tab by mouth two (2) times a day. -     QUEtiapine (SEROquel) 25 mg tablet; Take 1-2 tablets by mouth at bedtime as needed for anxiety/agitation/sleep  -     clonazePAM (KlonoPIN) 1 mg tablet; Take 1 Tab by mouth two (2) times daily as needed for Anxiety or Sleep. Max Daily Amount: 2 mg.  -     REFERRAL TO PSYCHIATRY    3. Essential hypertension    4. Left foot pain  -     nortriptyline (PAMELOR) 25 mg capsule; Take 1 Cap by mouth nightly. 5. Asthma  -     loratadine (CLARITIN) 10 mg tablet; TAKE 1 TABLET BY MOUTH DAILY AS NEEDED FOR ALLERGIES. -     albuterol (PROVENTIL HFA, VENTOLIN HFA, PROAIR HFA) 90 mcg/actuation inhaler; Take 1 Puff by inhalation every four (4) hours as needed for Wheezing. Other orders  -     diclofenac (VOLTAREN) 1 % gel; Apply 4 g to affected area four (4) times daily.   -     labetaloL (NORMODYNE) 100 mg tablet; Please take half a tablet twice daily in addition to the 300 mg tid,  -     labetaloL (NORMODYNE) 300 mg tablet; TAKE 1 TABLET BY MOUTH THREE TIMES A DAY

## 2021-03-02 NOTE — PROGRESS NOTES
Chief Complaint   Patient presents with    Elevated Blood Pressure     1. Have you been to the ER, urgent care clinic since your last visit? Hospitalized since your last visit? No    2. Have you seen or consulted any other health care providers outside of the [address] [place] [address] since your last visit? Include any pap smears or colon screening. Yes When: 2/25/21 Where: dentist  Reason for visit: filling      Verified patient with two type of identifiers.   seen at dentist on 2/25/21 ,  Advised to follow up with PCP due to elevated blood pressure,  180/101  Dr Gomez Reas aware of elevated blood pressure

## 2021-03-02 NOTE — PATIENT INSTRUCTIONS
Learning About Mood Disorders What are mood disorders? Mood disorders are medical problems that affect how you feel. They can impact your moods, thoughts, and actions. Mood disorders include: · Depression. This causes you to feel sad or hopeless for much of the time. · Bipolar disorder. This causes extreme mood changes from manic episodes of very high energy to extreme lows of depression. · Seasonal affective disorder (SAD). This is a type of depression that affects you during the same season each year. Most often people experience SAD during the fall and winter months when days are shorter and there is less light. What are the symptoms? Depression You may: · Feel sad or hopeless nearly every day. · Lose interest in or not get pleasure from most daily activities. You feel this way nearly every day. · Have low energy, changes in your appetite, or changes in how well you sleep. · Have trouble concentrating. · Think about death and suicide. Keep the numbers for these national suicide hotlines: 0-537-460-TALK (6-740.481.1906) and 3-694-PGCRAYA (8-959.675.4588). If you or someone you know talks about suicide or feeling hopeless, get help right away. Bipolar disorder Symptoms depend on your mood swings. You may: · Feel very happy, energetic, or on edge. · Feel like you need very little sleep. · Feel overly self-confident. · Do impulsive things, such as spending a lot of money. · Feel sad or hopeless. · Have racing thoughts or trouble thinking and making decisions. · Lose interest in things you have enjoyed in the past. 
· Think about death and suicide. Keep the numbers for these national suicide hotlines: 6-512-114-TALK (1-385-180-973.669.5986) and 1-281-MWHYLYX (4-590.785.8782). If you or someone you know talks about suicide or feeling hopeless, get help right away. Seasonal affective disorder (SAD) Symptoms come and go at about the same time each year. For most people with SAD, symptoms come during the winter when there is less daylight. You may: · Feel sad, grumpy, mari, or anxious. · Lose interest in your usual activities. · Eat more and crave carbohydrates, such as bread and pasta. · Gain weight. · Sleep more and feel drowsy during the daytime. How are mood disorders treated? Mood disorders can be treated with medicines or counseling, or a combination of both. Medicines for depression and SAD may include antidepressants. Medicines for bipolar disorder may include: · Mood stabilizers. · Antipsychotics. · Benzodiazepines. Counseling may involve cognitive-behavioral therapy. It teaches you how to change the ways you think and behave. This can help you stop thinking bad thoughts about yourself and your life. Light therapy is the main treatment for SAD. This therapy uses a special kind of lamp. You let the lamp shine on you at certain times, usually in the morning. This may help your symptoms during the months when there is less sunlight. Healthy lifestyle Healthy lifestyle changes may help you feel better. · Be active often. You might try walking or strength training. · Eat a healthy diet. Include fruits, vegetables, lean proteins, and whole grains in your diet each day. · Keep a regular sleep schedule. Try for 8 hours of sleep a night. · Find ways to manage stress, such as relaxation exercises. · Avoid alcohol and illegal drugs. Follow-up care is a key part of your treatment and safety. Be sure to make and go to all appointments, and call your doctor if you are having problems. It's also a good idea to know your test results and keep a list of the medicines you take. Where can you learn more? Go to http://www.gray.com/ Enter Q076 in the search box to learn more about \"Learning About Mood Disorders. \" 
 Current as of: January 31, 2020               Content Version: 12.6 © 9598-9685 [employer]. Care instructions adapted under license by [employer] (which disclaims liability or warranty for this information). If you have questions about a medical condition or this instruction, always ask your healthcare professional. [address] any warranty or liability for your use of this information. Home Blood Pressure Test: About This Test 
What is it? A home blood pressure test allows you to keep track of your blood pressure at home. Blood pressure is a measure of the force of blood against the walls of your arteries. Blood pressure readings include two numbers, such as 130/80 (say \"130 over 80\"). The first number is the systolic pressure. The second number is the diastolic pressure. Why is this test done? You may do this test at home to: · Find out if you have high blood pressure. · Track your blood pressure if you have high blood pressure. · Track how well medicine is working to reduce high blood pressure. · Check how lifestyle changes, such as weight loss and exercise, are affecting blood pressure. How do you prepare for the test? 
For at least 30 minutes before you take your blood pressure, don't exercise or use caffeine, tobacco, or medicines that raise blood pressure. Take your blood pressure while you feel comfortable and relaxed. Sit quietly with both feet on the floor for at least 5 minutes before the test. 
How is the test done? · Sit with your arm slightly bent and resting on a table so that your upper arm is at the same level as your heart. · Roll up your sleeve or take off your shirt to expose your upper arm. · Wrap the blood pressure cuff around your upper arm so that the lower edge of the cuff is about 1 inch above the bend of your elbow. Proceed with the following steps depending on if you are using an automatic or manual pressure monitor. Automatic blood pressure monitors · Press the on/off button on the automatic monitor and wait until the ready-to-measure \"heart\" symbol appears next to zero in the display window. · Press the start button. The cuff will inflate and deflate by itself. · Your blood pressure numbers will appear on the screen. · Write your numbers in your log book, along with the date and time. Manual blood pressure monitors · Place the earpieces of a stethoscope in your ears, and place the bell of the stethoscope over the artery, just below the cuff. · Close the valve on the rubber inflating bulb. · Squeeze the bulb rapidly with your opposite hand to inflate the cuff until the dial or column of mercury reads about 30 mm Hg higher than your usual systolic pressure. If you do not know your usual pressure, inflate the cuff to 210 mm Hg or until the pulse at your wrist disappears. · Open the pressure valve just slightly by twisting or pressing the valve on the bulb. · As you watch the pressure slowly fall, note the level on the dial at which you first start to hear a pulsing or tapping sound through the stethoscope. This is your systolic blood pressure. · Continue letting the air out slowly. The sounds will become muffled and will finally disappear. Note the pressure when the sounds completely disappear. This is your diastolic blood pressure. Let out all the remaining air. · Write your numbers in your log book, along with the date and time. Follow-up care is a key part of your treatment and safety. Be sure to make and go to all appointments, and call your doctor if you are having problems. It's also a good idea to keep a list of the medicines you take. Where can you learn more? Go to http://www.WorldOne.com/ Enter C427 in the search box to learn more about \"Home Blood Pressure Test: About This Test.\" Current as of: December 16, 2019               Content Version: 12.6 © 3018-3822 [employer]. Care instructions adapted under license by [employer] (which disclaims liability or warranty for this information). If you have questions about a medical condition or this instruction, always ask your healthcare professional. [address] any warranty or liability for your use of this information. Low Sodium Diet (2,000 Milligram): Care Instructions Your Care Instructions Too much sodium causes your body to hold on to extra water. This can raise your blood pressure and force your heart and kidneys to work harder. In very serious cases, this could cause you to be put in the hospital. It might even be life-threatening. By limiting sodium, you will feel better and lower your risk of serious problems. The most common source of sodium is salt. People get most of the salt in their diet from canned, prepared, and packaged foods. Fast food and restaurant meals also are very high in sodium. Your doctor will probably limit your sodium to less than 2,000 milligrams (mg) a day. This limit counts all the sodium in prepared and packaged foods and any salt you add to your food. Follow-up care is a key part of your treatment and safety. Be sure to make and go to all appointments, and call your doctor if you are having problems. It's also a good idea to know your test results and keep a list of the medicines you take. How can you care for yourself at home? Read food labels · Read labels on cans and food packages. The labels tell you how much sodium is in each serving. Make sure that you look at the serving size. If you eat more than the serving size, you have eaten more sodium. · Food labels also tell you the Percent Daily Value for sodium. Choose products with low Percent Daily Values for sodium. · Be aware that sodium can come in forms other than salt, including monosodium glutamate (MSG), sodium citrate, and sodium bicarbonate (baking soda). MSG is often added to Asian food. When you eat out, you can sometimes ask for food without MSG or added salt. Buy low-sodium foods · Buy foods that are labeled \"unsalted\" (no salt added), \"sodium-free\" (less than 5 mg of sodium per serving), or \"low-sodium\" (less than 140 mg of sodium per serving). Foods labeled \"reduced-sodium\" and \"light sodium\" may still have too much sodium. Be sure to read the label to see how much sodium you are getting. · Buy fresh vegetables, or frozen vegetables without added sauces. Buy low-sodium versions of canned vegetables, soups, and other canned goods. Prepare low-sodium meals · Cut back on the amount of salt you use in cooking. This will help you adjust to the taste. Do not add salt after cooking. One teaspoon of salt has about 2,300 mg of sodium. · Take the salt shaker off the table. · Flavor your food with garlic, lemon juice, onion, vinegar, herbs, and spices. Do not use soy sauce, lite soy sauce, steak sauce, onion salt, garlic salt, celery salt, mustard, or ketchup on your food. · Use low-sodium salad dressings, sauces, and ketchup. Or make your own salad dressings and sauces without adding salt. · Use less salt (or none) when recipes call for it. You can often use half the salt a recipe calls for without losing flavor. Other foods such as rice, pasta, and grains do not need added salt. · Rinse canned vegetables, and cook them in fresh water. This removes somebut not allof the salt. · Avoid water that is naturally high in sodium or that has been treated with water softeners, which add sodium. Call your local water company to find out the sodium content of your water supply. If you buy bottled water, read the label and choose a sodium-free brand. Avoid high-sodium foods · Avoid eating: 
? Smoked, cured, salted, and canned meat, fish, and poultry. ? Ham, vasquez, hot dogs, and luncheon meats. ? Regular, hard, and processed cheese and regular peanut butter. ? Crackers with salted tops, and other salted snack foods such as pretzels, chips, and salted popcorn. ? Frozen prepared meals, unless labeled low-sodium. ? Canned and dried soups, broths, and bouillon, unless labeled sodium-free or low-sodium. ? Canned vegetables, unless labeled sodium-free or low-sodium. ? [place] fries, pizza, tacos, and other fast foods. ? Pickles, olives, ketchup, and other condiments, especially soy sauce, unless labeled sodium-free or low-sodium. Where can you learn more? Go to http://www.gray.com/ Enter T644 in the search box to learn more about \"Low Sodium Diet (2,000 Milligram): Care Instructions. \" Current as of: August 22, 2019               Content Version: 12.6 © 2160-8407 [employer]. Care instructions adapted under license by [employer] (which disclaims liability or warranty for this information). If you have questions about a medical condition or this instruction, always ask your healthcare professional. [place] [address] any warranty or liability for your use of this information.

## 2021-03-02 NOTE — LETTER
AGREEMENT for controlled medication treatment Anshu Nasreen, have agreed to a course of treatment that includes taking controlled medication. For this treatment I designate _____________________________________ as the [facility] Provider.  The purpose of this agreement is to prevent misunderstandings about controlled medications I may be taking for treatment, and to comply with applicable laws. I understand this agreement is essential to the trust and confidence necessary in a provider-patient relationship and that the designated provider will treat me in accordance with the statements below. As part of my treatment I agree to the followin.  USE 
a. I will take the controlled medication as instructed by the designated provider and avoid improper use of controlled medications. b.   I will not share, sell or trade controlled medication with anyone as this is a criminal offense.  
c.   I will not use any illegal controlled substance, including marijuana, cocaine, etc. as this is a criminal offense. 2.  PROVIDERS 
a. I will only obtain controlled medication from the designated provider. b.   I will not attempt to obtain the same or similar controlled medications, such as opioid pain medication, stimulants, anti-anxiety or hypnotics from any other provider as this is a criminal offense. 
c.   I will inform the designated provider about all other licensed professionals providing medical care to me and authorize communication between all providers to coordinate care, particularly prescribing or dispensing of controlled medications. 3.  PHARMACY 
a.   I will only fill controlled medication prescriptions at the approved pharmacy as listed below. 4.  OFFICE VISITS 
a. I agree to attend scheduled office visit appointments. b.   I am aware my office visits may be monthly or as determined necessary by the designated provider. c.   I will communicate fully with the designated provider about the character and intensity of my symptoms, the effect of the symptoms on my daily life, and how well the controlled medication is helping to relieve the cause of my symptoms. 5.  REFILLS OR CALL-IN PRESCRIPTIONS OF CONTROLLED MEDICATIONS 
a. I agree that refills of my prescriptions for controlled medications will be made at the time of an office visit during regular office hours. No refills will be available during evenings or on weekends. Abusive or inappropriate behavior related to medication refills will not be tolerated. b.   I will not call the office repeatedly to inquire about my controlled medication. I understand that medications will be written on the due date and not before. Calling the office repeatedly will be considered harassment, and I may be discharged from the practice. c.   Controlled medications may not be called in for refill, but doing so is at the discretion of the designated provider. d.   I am aware that only I must  prescriptions for controlled medications at the office but the designated provider may allow a designee to  the prescription from the office under very specific circumstance that may develop. 6.  TOXICOLOGY SCREENING 
a. I agree to random urine toxicology screenings in order to comply with government and [address] [place] [address] regulations. I understand that I will be financially responsible for any charges incurred for the urine toxicology screening, which may not be covered by my insurance. Failure to do so will be considered non-compliance and I may be discharged. b. In some cases an oral swab or hair sample may be substituted for a urine screen. This is at the discretion of the designated provider. I understand that I will be financially responsible for any charges incurred as well. c.   I understand that if the urine toxicology does not show my medications prescribed to me by the designated provider, or it shows any illegal substance or any other medications NOT prescribed by the designated providers, I may be discharged from this practice at the discretion of the designated provider and no further controlled medications will be prescribed or follow-up appointments scheduled. Also, if any illegal substances are detected on the urine toxicology, this information may be provided to local law enforcement. 7. PILL COUNTS 
a. I am aware I may be called at any time to come into the office for a count of all my remaining controlled medications in order to help the designated provider understand the rate at which I use my controlled medications and to more effectively adjust dosage. b. I agree that I will use my medication at a rate NO greater than the prescribed rate and that use of my medication at a greater rate will result in my being without medication for the period of time until next expected due date. c. I will bring in the containers with the medication prescribed by all providers, including the designated provider, to each office visit even if there is no medication remaining. All controlled medication will be in the original containers from the pharmacy for each medication. Failure to do so will be considered non-compliance and I may be discharged from the practice. 8.  LOSS OR THEFT OF MEDICATION 
a. I will safeguard my controlled medication from loss or theft. b.   Lost or stolen controlled medication may not be replaced. This includes a prescription that has not yet been filled at the pharmacy. c.   In the event my controlled medications are stolen or lost, I will notify the designated providers office immediately. If such event occurs during the night, weekend or holiday, I will leave a detailed message on the answering machine or answering service at the number listed above. 
d.   I will file and produce an official police report for any effort to replace controlled medications prescribed. 9.  AGENCY COLLABORATION I authorize the designated provider and the authorized pharmacy/pharmacist to cooperate fully with any city, state, or federal law enforcement agency in the investigation of any possible misuse, sale or other diversion of my controlled medication. 10.  TREATMENT I understand that if I violate any of the conditions, my controlled medication and/or treatment will be terminated. If the violation involves obtaining controlled substances and/or dangerous drugs from another source, the incident may be reported to other medical facilities and authorities, including law enforcement. In this case, the designated provider may taper off the medication over a period of several days, as necessary, to avoid withdrawal symptoms or will suggest alternate treatment facilities. Also, a drug dependence treatment program may be recommended. 11.  AGREEMENT I agree to follow these guidelines that have been fully explained to me. All of my questions and concerns regarding treatment have been adequately answered. A copy of this document has been given to me. I agree to use ______________________________ Authorized Pharmacy located at _________________________________________________________________ Telephone ________________________________for filling ALL controlled medication prescriptions. This agreement is entered into on 3/2/2021 Patient signature__________________________________________________________ Legal Guardian signature___________________________________________________ Provider signature_________________________________________________________ Witness signature_________________________________________________________

## 2021-04-14 ENCOUNTER — VIRTUAL VISIT (OUTPATIENT)
Dept: FAMILY MEDICINE CLINIC | Age: 36
End: 2021-04-14
Payer: MEDICAID

## 2021-04-14 DIAGNOSIS — Z71.89 ADVICE GIVEN ABOUT COVID-19 VIRUS INFECTION: ICD-10-CM

## 2021-04-14 DIAGNOSIS — R60.0 LOCALIZED EDEMA: Primary | ICD-10-CM

## 2021-04-14 PROCEDURE — 99213 OFFICE O/P EST LOW 20 MIN: CPT | Performed by: FAMILY MEDICINE

## 2021-04-14 RX ORDER — FUROSEMIDE 20 MG/1
TABLET ORAL
Qty: 15 TAB | Refills: 0 | Status: SHIPPED | OUTPATIENT
Start: 2021-04-14 | End: 2021-06-09 | Stop reason: ALTCHOICE

## 2021-04-14 RX ORDER — POTASSIUM CHLORIDE 20 MEQ/1
20 TABLET, EXTENDED RELEASE ORAL DAILY
Qty: 15 TAB | Refills: 0 | Status: SHIPPED | OUTPATIENT
Start: 2021-04-14

## 2021-04-14 NOTE — PROGRESS NOTES
HISTORY OF PRESENT ILLNESS  Morelia Lowe is a 29 y.o. female. Pt's main concerns were provided on virtual visit, a telemed format,  pt is w/ comorbid medical history and unaware of been exposed to covid-19 individual, Pt Have been staying at home for couple of wks,  pt has no fever no cough no dyspnea, no ha, not dizzy, nl smell nl taste, no N/V/D, no body ache. Patient complains of edema. The location of the edema is lower leg(s) bilateral, ankle(s) bilateral, feet bilateral.  The edema has been moderate. Onset of symptoms was a few weeks ago, gradually worsening since that time. The edema is present all day. The patient states the problem is long-standing. on the pt last visits, the patient did not have much of the legs swelling, the weight was also better, Today the patient denies leg pain, denies rash, and legs lesion,and the denial of dizziness,   the wt went up  Currently 196lb         Current Outpatient Medications   Medication Sig Dispense Refill    nortriptyline (PAMELOR) 25 mg capsule Take 1 Cap by mouth nightly. 30 Cap 2    labetaloL (NORMODYNE) 100 mg tablet Please take half a tablet twice daily in addition to the 300 mg tid, 30 Tab 2    DULoxetine (CYMBALTA) 30 mg capsule Take 1 Cap by mouth daily. 30 Cap 3    lamoTRIgine (LaMICtal) 100 mg tablet Take 1 Tab by mouth two (2) times a day. 60 Tab 3    QUEtiapine (SEROquel) 25 mg tablet Take 1-2 tablets by mouth at bedtime as needed for anxiety/agitation/sleep 60 Tab 3    clonazePAM (KlonoPIN) 1 mg tablet Take 1 Tab by mouth two (2) times daily as needed for Anxiety or Sleep. Max Daily Amount: 2 mg. 60 Tab 2    loratadine (CLARITIN) 10 mg tablet TAKE 1 TABLET BY MOUTH DAILY AS NEEDED FOR ALLERGIES. 30 Tab 1    albuterol (PROVENTIL HFA, VENTOLIN HFA, PROAIR HFA) 90 mcg/actuation inhaler Take 1 Puff by inhalation every four (4) hours as needed for Wheezing.  1 Inhaler 3    labetaloL (NORMODYNE) 300 mg tablet TAKE 1 TABLET BY MOUTH THREE TIMES A DAY 90 Tab 2    diclofenac (VOLTAREN) 1 % gel Apply 4 g to affected area four (4) times daily. (Patient taking differently: Apply 4 g to affected area four (4) times daily. As needed) 100 g 3     Allergies   Allergen Reactions    Codeine Other (comments)     Hallucinations, raise blood pressure     Hydrocodone Hives    Lortab [Hydrocodone-Acetaminophen] Hives and Itching    Tramadol Nausea Only     Headache     Past Medical History:   Diagnosis Date    Anxiety disorder     Arthritis     Asthma     Bipolar 1 disorder (HCC)     Depression     Frequent headaches     Frequent headaches     Hypertension     Migraine     Muscle pain     Other ill-defined conditions(799.89)     dysplagia    Psychiatric disorder     bipolar, anxiety, depression, PTSD    PTSD (post-traumatic stress disorder)      Past Surgical History:   Procedure Laterality Date    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    HX GYN  3-    birth   Collette Laura HX OTHER SURGICAL      Dysplasia     Family History   Problem Relation Age of Onset    Cancer Mother     Diabetes Father     Other Father         Aneursym   Collette Satchel Migraines Sister      Social History     Tobacco Use    Smoking status: Current Every Day Smoker     Packs/day: 0.50     Years: 14.00     Pack years: 7.00     Types: Cigarettes     Start date: 2006    Smokeless tobacco: Never Used   Substance Use Topics    Alcohol use: Yes     Alcohol/week: 0.0 standard drinks     Comment: social      Lab Results   Component Value Date/Time    Cholesterol, total 186 09/10/2014 12:22 PM    HDL Cholesterol 40 09/10/2014 12:22 PM    LDL, calculated 115 (H) 09/10/2014 12:22 PM    Triglyceride 154 (H) 09/10/2014 12:22 PM    CHOL/HDL Ratio 3.5 04/21/2009 12:00 PM     Lab Results   Component Value Date/Time    ALT (SGPT) 18 12/09/2015 12:00 PM    Alk.  phosphatase 65 12/09/2015 12:00 PM    Bilirubin, total 0.2 12/09/2015 12:00 PM    Albumin 3.9 12/09/2015 12:00 PM    Protein, total 7.4 12/09/2015 12:00 PM    PLATELET 701 26/78/5891 12:00 PM        Review of Systems   Constitutional: Negative for chills and fever. HENT: Negative for nosebleeds. Eyes: Negative for pain. Respiratory: Negative for cough and wheezing. Cardiovascular: Positive for leg swelling. Negative for chest pain. Gastrointestinal: Negative for constipation, diarrhea and nausea. Genitourinary: Negative for frequency. Musculoskeletal: Negative for joint pain and myalgias. Skin: Negative for rash. Neurological: Negative for loss of consciousness and headaches. Endo/Heme/Allergies: Does not bruise/bleed easily. Psychiatric/Behavioral: Negative for depression. The patient is not nervous/anxious and does not have insomnia. All other systems reviewed and are negative. Physical Exam  Vitals signs and nursing note reviewed. Constitutional:       Appearance: She is well-developed. HENT:      Head: Normocephalic and atraumatic. Eyes:      Conjunctiva/sclera: Conjunctivae normal.   Neck:      Musculoskeletal: Normal range of motion and neck supple. Cardiovascular:      Rate and Rhythm: Normal rate and regular rhythm. Heart sounds: Normal heart sounds. No murmur. Pulmonary:      Effort: Pulmonary effort is normal.      Breath sounds: Normal breath sounds. Abdominal:      General: Bowel sounds are normal. There is no distension. Palpations: Abdomen is soft. Musculoskeletal: Normal range of motion. Skin:     Findings: No erythema. Neurological:      Mental Status: She is alert and oriented to person, place, and time. Psychiatric:         Behavior: Behavior normal.         ASSESSMENT and PLAN  Diagnoses and all orders for this visit:    1. Localized edema  -     furosemide (LASIX) 20 mg tablet; One tab daily  -     potassium chloride (K-DUR, KLOR-CON) 20 mEq tablet; Take 1 Tab by mouth daily.     2. Advice given about COVID-19 virus infection    pt was advised about not to have an  increased salt intake, avoid long flying or long car trips, used the diuretics as needed, and do the support stockings daily and off night, elevation of involved area, if any shortness of breath, high weight gain call for advise, avoid smoking/ tobacco exposure,and  sedentary life style, Leg elevation at all times or most of the times, use of two pillows at nights while in bed, ambulation as possible, pt agreed     Patient advised to have the mask on most of the time, social distance and handwashing avoid crowded area pursuant to the emergency declaration under the [employer] and [facility], [address] [place] [address] authority and the [employer] and [employer] Act, this Virtual Visit was conducted, with patient's consent, to reduce the patient's risk of exposure to COVID-19 and provide continuity of care for an established patient  Services were provided through a Video synchronous discussion virtually to substitute for in-person appointment.

## 2021-04-14 NOTE — PROGRESS NOTES
Chief Complaint   Patient presents with    Leg Swelling     1. Have you been to the ER, urgent care clinic since your last visit? Hospitalized since your last visit? No    2. Have you seen or consulted any other health care providers outside of the [address] [place] [address] since your last visit? Include any pap smears or colon screening. Yes When: 4/5/21 Where: ob/gyn Reason for visit: routine pap    Call placed to pt. Verified patient with two type of identifiers. c/o she is retaining fluid in her ankles and legs x 2 weeks,   Some pain in ankles and knees. Taking ibuprofen with no relief.

## 2021-04-30 NOTE — ANCILLARY DISCHARGE INSTRUCTIONS
[facility]. Physical Therapy  [address] [facility]  Phone: [phone] Fax: [phone]    Medicaid Discharge Summary  2-15    Patient name: Efe Snyder  : 1985  Provider#: [id number]  Referral source: Kalina Spear MD      Medical/Treatment Diagnosis: Left foot pain [M79.672]     Prior Hospitalization: see medical history     Comorbidities: See Plan of Care  Prior Level of Function:See Plan of Care  Medications: Verified on Patient Summary List    Start of Care: 21     Onset Date: 2020   Visits from Start of Care: 1     Missed Visits: 1  Reporting Period : 21 to 21      ASSESSMENT/SUMMARY OF CARE: The patient no-showed her one scheduled follow up visit after her initial evaluation for left lateral foot pain/neuralgia and did not schedule anymore. Pt is discharged today, 2021, as they have stopped attending therapy. Final objective data and outcomes were unable to be obtained. RECOMMENDATIONS:  [x]Discontinue therapy: []Patient has reached or is progressing toward set goals      [x]Patient is non-compliant or has abdicated      []Due to lack of appreciable progress towards set goals      []Other    Ana Luisa Morales, PT 2021       ______________________________________________________________________  NOTE TO PHYSICIAN:  Please complete the following and fax to: [facility]. Physical Therapy: Fax: [phone]. Retain this original for your records. If you are unable to process this request in 24 hours, please contact our office.      Physician's Signature:____________________  Date:____________Time:_________           Kalina Spear MD

## 2021-05-24 ENCOUNTER — TELEPHONE (OUTPATIENT)
Dept: FAMILY MEDICINE CLINIC | Age: 36
End: 2021-05-24

## 2021-05-24 NOTE — TELEPHONE ENCOUNTER
Blood pressure 180/126   And later it was 160/118. Miguel Angel Cortes Patient is currently enrolled in a remote symptom monitoring program.  Start day 12/22/2020, End Date 1/6/2021 . would like a return call today.  She is concerned about it   Thank you kindly   tim montilla 1985  [phone]
Returned call to pt.  C/o elevated blood pressure for 1 week,  denies c/o at present,  appt scheduled for Celestina@[employer]
normal...

## 2021-05-24 NOTE — TELEPHONE ENCOUNTER
Blood pressure 180/126 and later it was 160/ 118  Patient is concerned about it and wants a phone call  tim montilla 1985 [phone]

## 2021-05-25 ENCOUNTER — OFFICE VISIT (OUTPATIENT)
Dept: FAMILY MEDICINE CLINIC | Age: 36
End: 2021-05-25
Payer: MEDICAID

## 2021-05-25 VITALS
OXYGEN SATURATION: 98 % | DIASTOLIC BLOOD PRESSURE: 106 MMHG | WEIGHT: 198.3 LBS | HEART RATE: 80 BPM | SYSTOLIC BLOOD PRESSURE: 180 MMHG | HEIGHT: 62 IN | BODY MASS INDEX: 36.49 KG/M2 | RESPIRATION RATE: 16 BRPM

## 2021-05-25 DIAGNOSIS — R60.0 LOCALIZED EDEMA: ICD-10-CM

## 2021-05-25 DIAGNOSIS — F41.9 ANXIETY AND DEPRESSION: Primary | ICD-10-CM

## 2021-05-25 DIAGNOSIS — F32.A ANXIETY AND DEPRESSION: Primary | ICD-10-CM

## 2021-05-25 DIAGNOSIS — I10 ESSENTIAL HYPERTENSION: ICD-10-CM

## 2021-05-25 DIAGNOSIS — O13.3 GESTATIONAL HTN, THIRD TRIMESTER: ICD-10-CM

## 2021-05-25 DIAGNOSIS — F31.81 BIPOLAR 2 DISORDER, MAJOR DEPRESSIVE EPISODE (HCC): ICD-10-CM

## 2021-05-25 DIAGNOSIS — Z02.89 ENCOUNTER TO OBTAIN EXCUSE FROM WORK: ICD-10-CM

## 2021-05-25 DIAGNOSIS — G43.111 INTRACTABLE MIGRAINE WITH AURA WITH STATUS MIGRAINOSUS: ICD-10-CM

## 2021-05-25 PROCEDURE — 99214 OFFICE O/P EST MOD 30 MIN: CPT | Performed by: FAMILY MEDICINE

## 2021-05-25 RX ORDER — DULOXETIN HYDROCHLORIDE 30 MG/1
30 CAPSULE, DELAYED RELEASE ORAL DAILY
Qty: 30 CAPSULE | Refills: 3 | Status: SHIPPED | OUTPATIENT
Start: 2021-05-25

## 2021-05-25 RX ORDER — LABETALOL 100 MG/1
TABLET, FILM COATED ORAL
Qty: 30 TABLET | Refills: 2 | Status: SHIPPED | OUTPATIENT
Start: 2021-05-25 | End: 2021-06-09 | Stop reason: SDUPTHER

## 2021-05-25 RX ORDER — LAMOTRIGINE 100 MG/1
100 TABLET ORAL 2 TIMES DAILY
Qty: 60 TABLET | Refills: 3 | Status: SHIPPED | OUTPATIENT
Start: 2021-05-25

## 2021-05-25 RX ORDER — AMLODIPINE BESYLATE 10 MG/1
10 TABLET ORAL DAILY
Qty: 30 TABLET | Refills: 1 | Status: SHIPPED | OUTPATIENT
Start: 2021-05-25 | End: 2021-06-09 | Stop reason: SDUPTHER

## 2021-05-25 RX ORDER — LABETALOL 100 MG/1
TABLET, FILM COATED ORAL
Qty: 30 TABLET | Refills: 2
Start: 2021-05-25 | End: 2021-05-25 | Stop reason: SDUPTHER

## 2021-05-25 RX ORDER — QUETIAPINE FUMARATE 25 MG/1
TABLET, FILM COATED ORAL
Qty: 60 TABLET | Refills: 3 | Status: SHIPPED | OUTPATIENT
Start: 2021-05-25 | End: 2021-06-09 | Stop reason: ALTCHOICE

## 2021-05-25 NOTE — PROGRESS NOTES
Chief Complaint   Patient presents with    Elevated Blood Pressure     1. Have you been to the ER, urgent care clinic since your last visit? Hospitalized since your last visit? No    2. Have you seen or consulted any other health care providers outside of the [address] [place] [address] since your last visit? Include any pap smears or colon screening. NO     Verified patient with two type of identifiers. C/o elevated blood pressure x 1 week,   Highest 180/126,  Some headaches.  Requesting to restart metoprolol

## 2021-05-25 NOTE — PATIENT INSTRUCTIONS
Low Sodium Diet (2,000 Milligram): Care Instructions Overview Limiting sodium can be an important part of managing some health problems. The most common source of sodium is salt. People get most of the salt in their diet from canned, prepared, and packaged foods. Fast food and restaurant meals also are very high in sodium. Your doctor will probably limit your sodium to less than 2,000 milligrams (mg) a day. This limit counts all the sodium in prepared and packaged foods and any salt you add to your food. Follow-up care is a key part of your treatment and safety. Be sure to make and go to all appointments, and call your doctor if you are having problems. It's also a good idea to know your test results and keep a list of the medicines you take. How can you care for yourself at home? Read food labels · Read labels on cans and food packages. The labels tell you how much sodium is in each serving. Make sure that you look at the serving size. If you eat more than the serving size, you have eaten more sodium. · Food labels also tell you the Percent Daily Value for sodium. Choose products with low Percent Daily Values for sodium. · Be aware that sodium can come in forms other than salt, including monosodium glutamate (MSG), sodium citrate, and sodium bicarbonate (baking soda). MSG is often added to Asian food. When you eat out, you can sometimes ask for food without MSG or added salt. Buy low-sodium foods · Buy foods that are labeled \"unsalted\" (no salt added), \"sodium-free\" (less than 5 mg of sodium per serving), or \"low-sodium\" (140 mg or less of sodium per serving). Foods labeled \"reduced-sodium\" and \"light sodium\" may still have too much sodium. Be sure to read the label to see how much sodium you are getting. · Buy fresh vegetables, or frozen vegetables without added sauces. Buy low-sodium versions of canned vegetables, soups, and other canned goods. Prepare low-sodium meals · Cut back on the amount of salt you use in cooking. This will help you adjust to the taste. Do not add salt after cooking. One teaspoon of salt has about 2,300 mg of sodium. · Take the salt shaker off the table. · Flavor your food with garlic, lemon juice, onion, vinegar, herbs, and spices. Do not use soy sauce, lite soy sauce, steak sauce, onion salt, garlic salt, celery salt, or ketchup on your food. · Use low-sodium salad dressings, sauces, and ketchup. Or make your own salad dressings and sauces without adding salt. · Use less salt (or none) when recipes call for it. You can often use half the salt a recipe calls for without losing flavor. Other foods such as rice, pasta, and grains do not need added salt. · Rinse canned vegetables, and cook them in fresh water. This removes somebut not allof the salt. · Avoid water that is naturally high in sodium or that has been treated with water softeners, which add sodium. If you buy bottled water, read the label and choose a sodium-free brand. Avoid high-sodium foods · Avoid eating: 
? Smoked, cured, salted, and canned meat, fish, and poultry. ? Ham, vasquez, hot dogs, and luncheon meats. ? Regular, hard, and processed cheese and regular peanut butter. ? Crackers with salted tops, and other salted snack foods such as pretzels, chips, and salted popcorn. ? Frozen prepared meals, unless labeled low-sodium. ? Canned and dried soups, broths, and bouillon, unless labeled sodium-free or low-sodium. ? Canned vegetables, unless labeled sodium-free or low-sodium. ? [place] fries, pizza, tacos, and other fast foods. ? Pickles, olives, ketchup, and other condiments, especially soy sauce, unless labeled sodium-free or low-sodium. Where can you learn more? Go to http://www.gray.com/ Enter S970 in the search box to learn more about \"Low Sodium Diet (2,000 Milligram): Care Instructions. \" Current as of: December 17, 2020               Content Version: 12.8 © 5844-3357 Healthwise, [facility]. Care instructions adapted under license by [employer] (which disclaims liability or warranty for this information). If you have questions about a medical condition or this instruction, always ask your healthcare professional. [address] any warranty or liability for your use of this information. DASH Diet: Care Instructions Your Care Instructions The DASH diet is an eating plan that can help lower your blood pressure. DASH stands for Dietary Approaches to Stop Hypertension. Hypertension is high blood pressure. The DASH diet focuses on eating foods that are high in calcium, potassium, and magnesium. These nutrients can lower blood pressure. The foods that are highest in these nutrients are fruits, vegetables, low-fat dairy products, nuts, seeds, and legumes. But taking calcium, potassium, and magnesium supplements instead of eating foods that are high in those nutrients does not have the same effect. The DASH diet also includes whole grains, fish, and poultry. The DASH diet is one of several lifestyle changes your doctor may recommend to lower your high blood pressure. Your doctor may also want you to decrease the amount of sodium in your diet. Lowering sodium while following the DASH diet can lower blood pressure even further than just the DASH diet alone. Follow-up care is a key part of your treatment and safety. Be sure to make and go to all appointments, and call your doctor if you are having problems. It's also a good idea to know your test results and keep a list of the medicines you take. How can you care for yourself at home? Following the DASH diet · Eat 4 to 5 servings of fruit each day. A serving is 1 medium-sized piece of fruit, ½ cup chopped or canned fruit, 1/4 cup dried fruit, or 4 ounces (½ cup) of fruit juice. Choose fruit more often than fruit juice. · Eat 4 to 5 servings of vegetables each day.  A serving is 1 cup of lettuce or raw leafy vegetables, ½ cup of chopped or cooked vegetables, or 4 ounces (½ cup) of vegetable juice. Choose vegetables more often than vegetable juice. · Get 2 to 3 servings of low-fat and fat-free dairy each day. A serving is 8 ounces of milk, 1 cup of yogurt, or 1 ½ ounces of cheese. · Eat 6 to 8 servings of grains each day. A serving is 1 slice of bread, 1 ounce of dry cereal, or ½ cup of cooked rice, pasta, or cooked cereal. Try to choose whole-grain products as much as possible. · Limit lean meat, poultry, and fish to 2 servings each day. A serving is 3 ounces, about the size of a deck of cards. · Eat 4 to 5 servings of nuts, seeds, and legumes (cooked dried beans, lentils, and split peas) each week. A serving is 1/3 cup of nuts, 2 tablespoons of seeds, or ½ cup of cooked beans or peas. · Limit fats and oils to 2 to 3 servings each day. A serving is 1 teaspoon of vegetable oil or 2 tablespoons of salad dressing. · Limit sweets and added sugars to 5 servings or less a week. A serving is 1 tablespoon jelly or jam, ½ cup sorbet, or 1 cup of lemonade. · Eat less than 2,300 milligrams (mg) of sodium a day. If you limit your sodium to 1,500 mg a day, you can lower your blood pressure even more. · Be aware that all of these are the suggested number of servings for people who eat 1,800 to 2,000 calories a day. Your recommended number of servings may be different if you need more or fewer calories. Tips for success · Start small. Do not try to make dramatic changes to your diet all at once. You might feel that you are missing out on your favorite foods and then be more likely to not follow the plan. Make small changes, and stick with them. Once those changes become habit, add a few more changes. · Try some of the following: ? Make it a goal to eat a fruit or vegetable at every meal and at snacks. This will make it easy to get the recommended amount of fruits and vegetables each day. ? Try yogurt topped with fruit and nuts for a snack or healthy dessert. ? Add lettuce, tomato, cucumber, and onion to sandwiches. ? Combine a ready-made pizza crust with low-fat mozzarella cheese and lots of vegetable toppings. Try using tomatoes, squash, spinach, broccoli, carrots, cauliflower, and onions. ? Have a variety of cut-up vegetables with a low-fat dip as an appetizer instead of chips and dip. ? Sprinkle sunflower seeds or chopped almonds over salads. Or try adding chopped walnuts or almonds to cooked vegetables. ? Try some vegetarian meals using beans and peas. Add garbanzo or kidney beans to salads. Make burritos and tacos with mashed mccoy beans or black beans. Where can you learn more? Go to http://www.gray.com/ Enter S366 in the search box to learn more about \"DASH Diet: Care Instructions. \" Current as of: August 31, 2020               Content Version: 12.8 © 1516-6402 [employer]. Care instructions adapted under license by [employer] (which disclaims liability or warranty for this information). If you have questions about a medical condition or this instruction, always ask your healthcare professional. [facility] [address] any warranty or liability for your use of this information.

## 2021-05-25 NOTE — LETTER
NOTIFICATION RETURN TO WORK / SCHOOL 
 
5/25/2021 8:51 AM 
 
Ms. Kia Garcia Works [facility] [address], [address] [facility] [address] To Whom It May Concern: 
 
Jem Mi is currently under the care of [address] [place] [address]. She will return to work/school on: 5/26/21 If there are questions or concerns please have the patient contact our office.  
 
 
 
Sincerely, 
 
 
 
Scot Peraza MD

## 2021-05-25 NOTE — PROGRESS NOTES
HISTORY OF PRESENT ILLNESS  Aurelia Mtz is a 29 y.o. female. Patient present with history of bipolar state anxiety and depression denies any suicidal homicidal ideation compliant with her medication unable to find a psychiatrist patient has been referred multiple times to different doctor around her fortunately unable to find 1 requesting a refill for her medication in addition patient states that she has been getting headache from the  side effect of her recurrent blood pressure elevation,     Today pt present for Bp check and she has been a Compliancy w/ the bp meds, having had the low salt diet   the patient has not been active life style and does do the daily walking, unfortunately a tobacco abuser as well she states that the vacuum relaxes her has been trying to slow down unfortunately not been succeeded medication offered but opted at this time   pt states that meli does not obtain the bp at home few times a week last office visit her blood pressure medication has been increased to 1/2 tablet 3 times daily unfortunately not able control the blood pressure at this time, today the pt denies Chest Pain, has no legs swelling no lightheadedness,the pat has not been feeling anxious, and  Has not been feeling stressed out,     She also needs a letter to be excused from work secondary to recurrent headache and uncontrolled blood pressure feeling more anxious    Morning HA   Started few yrs Ago, one sided pulsating lasting few hrs, has tried otc not helping, pt states that the HA Worsens by lights and loud noises,   the pain is associated with feeling of  Nausea, and pt hadno voimiting the pain is 7/10 at this time, it occures 2-4 times per wk,    if the HA occurs the pt is unable to do any work while having the HA,         Current Outpatient Medications   Medication Sig Dispense Refill    nortriptyline (PAMELOR) 25 mg capsule Take 1 Cap by mouth nightly.  30 Cap 2    labetaloL (NORMODYNE) 100 mg tablet Please take half a tablet twice daily in addition to the 300 mg tid, 30 Tab 2    DULoxetine (CYMBALTA) 30 mg capsule Take 1 Cap by mouth daily. 30 Cap 3    lamoTRIgine (LaMICtal) 100 mg tablet Take 1 Tab by mouth two (2) times a day. 60 Tab 3    QUEtiapine (SEROquel) 25 mg tablet Take 1-2 tablets by mouth at bedtime as needed for anxiety/agitation/sleep 60 Tab 3    clonazePAM (KlonoPIN) 1 mg tablet Take 1 Tab by mouth two (2) times daily as needed for Anxiety or Sleep. Max Daily Amount: 2 mg. 60 Tab 2    loratadine (CLARITIN) 10 mg tablet TAKE 1 TABLET BY MOUTH DAILY AS NEEDED FOR ALLERGIES. 30 Tab 1    albuterol (PROVENTIL HFA, VENTOLIN HFA, PROAIR HFA) 90 mcg/actuation inhaler Take 1 Puff by inhalation every four (4) hours as needed for Wheezing. 1 Inhaler 3    furosemide (LASIX) 20 mg tablet One tab daily (Patient not taking: Reported on 5/25/2021) 15 Tab 0    potassium chloride (K-DUR, KLOR-CON) 20 mEq tablet Take 1 Tab by mouth daily. (Patient not taking: Reported on 5/25/2021) 15 Tab 0    diclofenac (VOLTAREN) 1 % gel Apply 4 g to affected area four (4) times daily. (Patient taking differently: Apply 4 g to affected area four (4) times daily. As needed) 100 g 3     Allergies   Allergen Reactions    Codeine Other (comments)     Hallucinations, raise blood pressure     Hydrocodone Hives    Lortab [Hydrocodone-Acetaminophen] Hives and Itching    Tramadol Nausea Only     Headache     Past Medical History:   Diagnosis Date    Anxiety disorder     Arthritis     Asthma     Bipolar 1 disorder (HCC)     Depression     Frequent headaches     Frequent headaches     Hypertension     Migraine     Muscle pain     Other ill-defined conditions(039.89)     dysplagia    Psychiatric disorder     bipolar, anxiety, depression, PTSD    PTSD (post-traumatic stress disorder)      Past Surgical History:   Procedure Laterality Date    HX AMPUTATION Left     left hand middle finger.  8/2014    HX GYN  3- birth   [facility] HX OTHER SURGICAL      Dysplasia     Family History   Problem Relation Age of Onset    Cancer Mother     Diabetes Father     Other Father         Aneursym   [facility] Migraines Sister      Social History     Tobacco Use    Smoking status: Current Every Day Smoker     Packs/day: 0.50     Years: 14.00     Pack years: 7.00     Types: Cigarettes     Start date: 2006    Smokeless tobacco: Never Used   Substance Use Topics    Alcohol use: Yes     Alcohol/week: 0.0 standard drinks     Comment: social      Lab Results   Component Value Date/Time    ALT (SGPT) 18 12/09/2015 12:00 PM    Alk. phosphatase 65 12/09/2015 12:00 PM    Bilirubin, total 0.2 12/09/2015 12:00 PM    Albumin 3.9 12/09/2015 12:00 PM    Protein, total 7.4 12/09/2015 12:00 PM    PLATELET 715 99/03/8556 12:00 PM        Review of Systems   Constitutional: Negative for chills and fever. HENT: Negative for nosebleeds. Eyes: Negative for pain. Respiratory: Negative for cough and wheezing. Cardiovascular: Negative for chest pain and leg swelling. Gastrointestinal: Negative for constipation, diarrhea and nausea. Genitourinary: Negative for frequency. Musculoskeletal: Negative for joint pain and myalgias. Skin: Negative for rash. Neurological: Negative for loss of consciousness and headaches. Endo/Heme/Allergies: Does not bruise/bleed easily. Psychiatric/Behavioral: Negative for depression. The patient is not nervous/anxious and does not have insomnia. All other systems reviewed and are negative. Physical Exam  Vitals and nursing note reviewed. Constitutional:       Appearance: She is well-developed. HENT:      Head: Normocephalic and atraumatic. Eyes:      Conjunctiva/sclera: Conjunctivae normal.      Pupils: Pupils are equal, round, and reactive to light. Neck:      Thyroid: No thyromegaly. Vascular: No JVD. Cardiovascular:      Rate and Rhythm: Normal rate and regular rhythm.       Heart sounds: Normal heart sounds. No murmur heard. No friction rub. No gallop. Pulmonary:      Effort: Pulmonary effort is normal. No respiratory distress. Breath sounds: Normal breath sounds. No stridor. No wheezing or rales. Abdominal:      General: Bowel sounds are normal. There is no distension. Palpations: Abdomen is soft. There is no mass. Tenderness: There is no abdominal tenderness. Musculoskeletal:         General: No tenderness. Normal range of motion. Lymphadenopathy:      Cervical: No cervical adenopathy. Skin:     Findings: No erythema or rash. Neurological:      Mental Status: She is alert and oriented to person, place, and time. Cranial Nerves: No cranial nerve deficit. Deep Tendon Reflexes: Reflexes are normal and symmetric. Psychiatric:         Behavior: Behavior normal.         ASSESSMENT and PLAN  Diagnoses and all orders for this visit:    1. Anxiety and depression  -     amLODIPine (NORVASC) 10 mg tablet; Take 1 Tablet by mouth daily. -     ubrogepant Arvie Gathers) 50 mg tablet; Take 1 Tablet by mouth once as needed for Migraine (not exceed 4 tabs per day) for up to 1 dose. -     labetaloL (NORMODYNE) 100 mg tablet; Please take half a tablet twice daily in addition to the 300 mg tid,  -     DULoxetine (CYMBALTA) 30 mg capsule; Take 1 Capsule by mouth daily. -     lamoTRIgine (LaMICtal) 100 mg tablet; Take 1 Tablet by mouth two (2) times a day. -     QUEtiapine (SEROquel) 25 mg tablet; Take 1-2 tablets by mouth at bedtime as needed for anxiety/agitation/sleep    2. Localized edema    3. Bipolar 2 disorder, major depressive episode (HCC)  -     DULoxetine (CYMBALTA) 30 mg capsule; Take 1 Capsule by mouth daily. -     lamoTRIgine (LaMICtal) 100 mg tablet; Take 1 Tablet by mouth two (2) times a day. -     QUEtiapine (SEROquel) 25 mg tablet; Take 1-2 tablets by mouth at bedtime as needed for anxiety/agitation/sleep    4.  Gestational HTN, third trimester  -     amLODIPine (NORVASC) 10 mg tablet; Take 1 Tablet by mouth daily. 5. Essential hypertension  -     amLODIPine (NORVASC) 10 mg tablet; Take 1 Tablet by mouth daily. 6. Encounter to obtain excuse from work    7. Intractable migraine with aura with status migrainosus  -     ubrogepant (Ubrelvy) 50 mg tablet; Take 1 Tablet by mouth once as needed for Migraine (not exceed 4 tabs per day) for up to 1 dose. -     QUEtiapine (SEROquel) 25 mg tablet;  Take 1-2 tablets by mouth at bedtime as needed for anxiety/agitation/sleep        Secondary to the patient acute medical conditions, a letter on the pt's behalf was completed, pt's multiple questions answered to the best knowledge,  will keep a copy in the chart for further correspondence, patient was informed about the safety and  regulations regarding this condition patient was also advised to follow-up with HR for further guidelines patient acknowledged understanding and agreed with today's recommendations

## 2021-05-26 ENCOUNTER — DOCUMENTATION ONLY (OUTPATIENT)
Dept: FAMILY MEDICINE CLINIC | Age: 36
End: 2021-05-26

## 2021-05-26 RX ORDER — RIMEGEPANT SULFATE 75 MG/75MG
75 TABLET, ORALLY DISINTEGRATING ORAL
Qty: 8 TABLET | Refills: 5 | Status: CANCELLED | OUTPATIENT
Start: 2021-05-26 | End: 2021-05-26

## 2021-06-09 ENCOUNTER — OFFICE VISIT (OUTPATIENT)
Dept: FAMILY MEDICINE CLINIC | Age: 36
End: 2021-06-09
Payer: MEDICAID

## 2021-06-09 VITALS
RESPIRATION RATE: 16 BRPM | DIASTOLIC BLOOD PRESSURE: 92 MMHG | SYSTOLIC BLOOD PRESSURE: 126 MMHG | BODY MASS INDEX: 35.59 KG/M2 | HEART RATE: 87 BPM | HEIGHT: 62 IN | TEMPERATURE: 98.7 F | OXYGEN SATURATION: 99 % | WEIGHT: 193.4 LBS

## 2021-06-09 DIAGNOSIS — F32.A ANXIETY AND DEPRESSION: ICD-10-CM

## 2021-06-09 DIAGNOSIS — O13.3 GESTATIONAL HTN, THIRD TRIMESTER: ICD-10-CM

## 2021-06-09 DIAGNOSIS — F41.9 ANXIETY AND DEPRESSION: ICD-10-CM

## 2021-06-09 DIAGNOSIS — I10 ESSENTIAL HYPERTENSION: Primary | ICD-10-CM

## 2021-06-09 LAB
ALBUMIN SERPL-MCNC: 4.1 G/DL (ref 3.5–5)
ALBUMIN/GLOB SERPL: 1.1 {RATIO} (ref 1.1–2.2)
ALP SERPL-CCNC: 101 U/L (ref 45–117)
ALT SERPL-CCNC: 36 U/L (ref 12–78)
ANION GAP SERPL CALC-SCNC: 7 MMOL/L (ref 5–15)
APPEARANCE UR: CLEAR
AST SERPL-CCNC: 6 U/L (ref 15–37)
BACTERIA URNS QL MICRO: NEGATIVE /HPF
BASOPHILS # BLD: 0 K/UL (ref 0–0.1)
BASOPHILS NFR BLD: 0 % (ref 0–1)
BILIRUB SERPL-MCNC: 0.3 MG/DL (ref 0.2–1)
BILIRUB UR QL: NEGATIVE
BUN SERPL-MCNC: 9 MG/DL (ref 6–20)
BUN/CREAT SERPL: 11 (ref 12–20)
CALCIUM SERPL-MCNC: 10 MG/DL (ref 8.5–10.1)
CHLORIDE SERPL-SCNC: 110 MMOL/L (ref 97–108)
CHOLEST SERPL-MCNC: 181 MG/DL
CO2 SERPL-SCNC: 24 MMOL/L (ref 21–32)
COLOR UR: ABNORMAL
CREAT SERPL-MCNC: 0.84 MG/DL (ref 0.55–1.02)
DIFFERENTIAL METHOD BLD: ABNORMAL
EOSINOPHIL # BLD: 0 K/UL (ref 0–0.4)
EOSINOPHIL NFR BLD: 0 % (ref 0–7)
EPITH CASTS URNS QL MICRO: ABNORMAL /LPF
ERYTHROCYTE [DISTWIDTH] IN BLOOD BY AUTOMATED COUNT: 16 % (ref 11.5–14.5)
EST. AVERAGE GLUCOSE BLD GHB EST-MCNC: 114 MG/DL
GLOBULIN SER CALC-MCNC: 3.8 G/DL (ref 2–4)
GLUCOSE SERPL-MCNC: 106 MG/DL (ref 65–100)
GLUCOSE UR STRIP.AUTO-MCNC: NEGATIVE MG/DL
HBA1C MFR BLD: 5.6 % (ref 4–5.6)
HCT VFR BLD AUTO: 39 % (ref 35–47)
HDLC SERPL-MCNC: 29 MG/DL
HDLC SERPL: 6.2 {RATIO} (ref 0–5)
HGB BLD-MCNC: 12.1 G/DL (ref 11.5–16)
HGB UR QL STRIP: NEGATIVE
IMM GRANULOCYTES # BLD AUTO: 0 K/UL (ref 0–0.04)
IMM GRANULOCYTES NFR BLD AUTO: 0 % (ref 0–0.5)
KETONES UR QL STRIP.AUTO: ABNORMAL MG/DL
LDLC SERPL CALC-MCNC: 130.8 MG/DL (ref 0–100)
LEUKOCYTE ESTERASE UR QL STRIP.AUTO: NEGATIVE
LYMPHOCYTES # BLD: 1.7 K/UL (ref 0.8–3.5)
LYMPHOCYTES NFR BLD: 28 % (ref 12–49)
MCH RBC QN AUTO: 25.9 PG (ref 26–34)
MCHC RBC AUTO-ENTMCNC: 31 G/DL (ref 30–36.5)
MCV RBC AUTO: 83.5 FL (ref 80–99)
MONOCYTES # BLD: 0.3 K/UL (ref 0–1)
MONOCYTES NFR BLD: 5 % (ref 5–13)
MUCOUS THREADS URNS QL MICRO: ABNORMAL /LPF
NEUTS SEG # BLD: 4.2 K/UL (ref 1.8–8)
NEUTS SEG NFR BLD: 67 % (ref 32–75)
NITRITE UR QL STRIP.AUTO: NEGATIVE
NRBC # BLD: 0 K/UL (ref 0–0.01)
NRBC BLD-RTO: 0 PER 100 WBC
PH UR STRIP: 5.5 [PH] (ref 5–8)
PLATELET # BLD AUTO: 374 K/UL (ref 150–400)
PMV BLD AUTO: 9.9 FL (ref 8.9–12.9)
POTASSIUM SERPL-SCNC: 3.5 MMOL/L (ref 3.5–5.1)
PROT SERPL-MCNC: 7.9 G/DL (ref 6.4–8.2)
PROT UR STRIP-MCNC: ABNORMAL MG/DL
RBC # BLD AUTO: 4.67 M/UL (ref 3.8–5.2)
RBC #/AREA URNS HPF: ABNORMAL /HPF (ref 0–5)
SODIUM SERPL-SCNC: 141 MMOL/L (ref 136–145)
SP GR UR REFRACTOMETRY: 1.02 (ref 1–1.03)
TRIGL SERPL-MCNC: 106 MG/DL (ref ?–150)
TSH SERPL DL<=0.05 MIU/L-ACNC: 0.9 UIU/ML (ref 0.36–3.74)
UROBILINOGEN UR QL STRIP.AUTO: 0.2 EU/DL (ref 0.2–1)
VLDLC SERPL CALC-MCNC: 21.2 MG/DL
WBC # BLD AUTO: 6.2 K/UL (ref 3.6–11)
WBC URNS QL MICRO: ABNORMAL /HPF (ref 0–4)
YEAST URNS QL MICRO: PRESENT

## 2021-06-09 PROCEDURE — 99213 OFFICE O/P EST LOW 20 MIN: CPT | Performed by: FAMILY MEDICINE

## 2021-06-09 RX ORDER — LABETALOL 100 MG/1
TABLET, FILM COATED ORAL
Qty: 30 TABLET | Refills: 2
Start: 2021-06-09 | End: 2021-07-27 | Stop reason: SDUPTHER

## 2021-06-09 RX ORDER — AMLODIPINE BESYLATE 10 MG/1
10 TABLET ORAL DAILY
Qty: 30 TABLET | Refills: 1
Start: 2021-06-09 | End: 2021-07-27 | Stop reason: SDUPTHER

## 2021-06-09 NOTE — PROGRESS NOTES
1. Have you been to the ER, urgent care clinic since your last visit? Hospitalized since your last visit? No    2. Have you seen or consulted any other health care providers outside of the [address] [place] [address] since your last visit? Include any pap smears or colon screening. No     Chief Complaint   Patient presents with    Follow-up     BP medication follow up. BP elevated 126/92. MD notified.

## 2021-06-09 NOTE — PROGRESS NOTES
HISTORY OF PRESENT ILLNESS  Stanislav Pickens is a 29 y.o. female,Today pt present for Bp check and the patient stating that so far there has been a Compliancy w/ the bp meds, having had the low salt diet and try to the best of pt's knowledge to avoid smoked meats, the patient has been active life style and does do the daily walking, in addition pt states that patien does obtain the bp at home few times a week and the average of 130/80, today the pt denies Chest Pain, has no legs swelling no lightheadedness,the pat has not been feeling anxious, and  Has not been feeling stressed out, otherwise feeling better since the last visit    Current Outpatient Medications   Medication Sig Dispense Refill    amLODIPine (NORVASC) 10 mg tablet Take 1 Tablet by mouth daily. 30 Tablet 1    labetaloL (NORMODYNE) 100 mg tablet Please take half a tablet twice daily in addition to the 300 mg tid, 30 Tablet 2    DULoxetine (CYMBALTA) 30 mg capsule Take 1 Capsule by mouth daily. 30 Capsule 3    lamoTRIgine (LaMICtal) 100 mg tablet Take 1 Tablet by mouth two (2) times a day. 60 Tablet 3    QUEtiapine (SEROquel) 25 mg tablet Take 1-2 tablets by mouth at bedtime as needed for anxiety/agitation/sleep 60 Tablet 3    furosemide (LASIX) 20 mg tablet One tab daily 15 Tab 0    nortriptyline (PAMELOR) 25 mg capsule Take 1 Cap by mouth nightly. 30 Cap 2    diclofenac (VOLTAREN) 1 % gel Apply 4 g to affected area four (4) times daily. (Patient taking differently: Apply 4 g to affected area four (4) times daily. As needed) 100 g 3    clonazePAM (KlonoPIN) 1 mg tablet Take 1 Tab by mouth two (2) times daily as needed for Anxiety or Sleep. Max Daily Amount: 2 mg. 60 Tab 2    loratadine (CLARITIN) 10 mg tablet TAKE 1 TABLET BY MOUTH DAILY AS NEEDED FOR ALLERGIES. 30 Tab 1    albuterol (PROVENTIL HFA, VENTOLIN HFA, PROAIR HFA) 90 mcg/actuation inhaler Take 1 Puff by inhalation every four (4) hours as needed for Wheezing.  1 Inhaler 3    potassium chloride (K-DUR, KLOR-CON) 20 mEq tablet Take 1 Tab by mouth daily. (Patient not taking: Reported on 5/25/2021) 15 Tab 0     Allergies   Allergen Reactions    Codeine Other (comments)     Hallucinations, raise blood pressure     Hydrocodone Hives    Lortab [Hydrocodone-Acetaminophen] Hives and Itching    Tramadol Nausea Only     Headache     Past Medical History:   Diagnosis Date    Anxiety disorder     Arthritis     Asthma     Bipolar 1 disorder (HCC)     Depression     Frequent headaches     Frequent headaches     Hypertension     Migraine     Muscle pain     Other ill-defined conditions(799.89)     dysplagia    Psychiatric disorder     bipolar, anxiety, depression, PTSD    PTSD (post-traumatic stress disorder)      Past Surgical History:   Procedure Laterality Date    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    HX GYN  3-    birth   Mathew HX OTHER SURGICAL      Dysplasia     Family History   Problem Relation Age of Onset    Cancer Mother     Diabetes Father     Other Father         Aneursym   Mathew Migraines Sister      Social History     Tobacco Use    Smoking status: Current Every Day Smoker     Packs/day: 0.50     Years: 14.00     Pack years: 7.00     Types: Cigarettes     Start date: 2006    Smokeless tobacco: Never Used   Substance Use Topics    Alcohol use:  Yes     Alcohol/week: 0.0 standard drinks     Comment: social      Lab Results   Component Value Date/Time    WBC 6.3 12/09/2015 12:00 PM    HGB 11.5 12/09/2015 12:00 PM    HCT 34.9 (L) 12/09/2015 12:00 PM    PLATELET 785 56/06/1878 12:00 PM    MCV 88.8 12/09/2015 12:00 PM     Lab Results   Component Value Date/Time    GFR est non-AA >60 12/09/2015 12:00 PM    GFR est AA >60 12/09/2015 12:00 PM    Creatinine 0.95 12/09/2015 12:00 PM    BUN 9 12/09/2015 12:00 PM    Sodium 139 12/09/2015 12:00 PM    Potassium 3.9 12/09/2015 12:00 PM    Chloride 105 12/09/2015 12:00 PM    CO2 26 12/09/2015 12:00 PM        Review of Systems   Constitutional: Positive for malaise/fatigue. Negative for chills and fever. HENT: Negative for nosebleeds. Eyes: Negative for pain. Respiratory: Negative for cough and wheezing. Cardiovascular: Negative for chest pain and leg swelling. Gastrointestinal: Negative for constipation, diarrhea and nausea. Genitourinary: Negative for frequency. Musculoskeletal: Negative for joint pain and myalgias. Skin: Negative for rash. Neurological: Positive for headaches. Negative for loss of consciousness. Endo/Heme/Allergies: Does not bruise/bleed easily. Psychiatric/Behavioral: Negative for depression. The patient is not nervous/anxious and does not have insomnia. All other systems reviewed and are negative. Physical Exam  Vitals and nursing note reviewed. Constitutional:       Appearance: She is well-developed. HENT:      Head: Normocephalic and atraumatic. Eyes:      Conjunctiva/sclera: Conjunctivae normal.   Cardiovascular:      Rate and Rhythm: Normal rate and regular rhythm. Heart sounds: Normal heart sounds. No murmur heard. Pulmonary:      Effort: Pulmonary effort is normal.      Breath sounds: Normal breath sounds. Abdominal:      General: Bowel sounds are normal. There is no distension. Palpations: Abdomen is soft. Musculoskeletal:         General: Normal range of motion. Cervical back: Normal range of motion and neck supple. Lymphadenopathy:      Cervical: No cervical adenopathy. Skin:     Findings: No erythema. Neurological:      Mental Status: She is alert and oriented to person, place, and time. Psychiatric:         Behavior: Behavior normal.         ASSESSMENT and PLAN  Diagnoses and all orders for this visit:    1. Essential hypertension  -     amLODIPine (NORVASC) 10 mg tablet; Take 1 Tablet by mouth daily.   -     labetaloL (NORMODYNE) 100 mg tablet; Please take half a tablet twice daily in addition to the 300 mg tid,  -     CBC WITH AUTOMATED DIFF; Future  -     METABOLIC PANEL, COMPREHENSIVE; Future  -     LIPID PANEL; Future  -     HEMOGLOBIN A1C WITH EAG; Future  -     TSH 3RD GENERATION; Future  -     URINALYSIS W/ RFLX MICROSCOPIC; Future    2. Anxiety and depression  -     amLODIPine (NORVASC) 10 mg tablet; Take 1 Tablet by mouth daily. -     labetaloL (NORMODYNE) 100 mg tablet; Please take half a tablet twice daily in addition to the 300 mg tid,  -     CBC WITH AUTOMATED DIFF; Future  -     METABOLIC PANEL, COMPREHENSIVE; Future  -     LIPID PANEL; Future  -     HEMOGLOBIN A1C WITH EAG; Future  -     TSH 3RD GENERATION; Future  -     URINALYSIS W/ RFLX MICROSCOPIC; Future    3. Gestational HTN, third trimester  -     amLODIPine (NORVASC) 10 mg tablet; Take 1 Tablet by mouth daily. -     labetaloL (NORMODYNE) 100 mg tablet; Please take half a tablet twice daily in addition to the 300 mg tid,  -     CBC WITH AUTOMATED DIFF; Future  -     METABOLIC PANEL, COMPREHENSIVE; Future  -     LIPID PANEL; Future  -     HEMOGLOBIN A1C WITH EAG; Future  -     TSH 3RD GENERATION; Future  -     URINALYSIS W/ RFLX MICROSCOPIC;  Future    HTN controlled, no change of bp meds at this time,  Discussed sodium restriction, high k rich diet,  maintaining ideal body weight and regular exercise program such as daily walking 30 min perday 4-5 times per week,  medication compliance advised, was told to call back for rfs or of any concern,   pt was told to obtain bp daily if bp >150/90 or ,90/60 pt was told to call for further advise pt agreed

## 2021-07-27 ENCOUNTER — OFFICE VISIT (OUTPATIENT)
Dept: FAMILY MEDICINE CLINIC | Age: 36
End: 2021-07-27
Payer: MEDICAID

## 2021-07-27 VITALS
DIASTOLIC BLOOD PRESSURE: 126 MMHG | HEART RATE: 93 BPM | HEIGHT: 62 IN | SYSTOLIC BLOOD PRESSURE: 167 MMHG | BODY MASS INDEX: 36.82 KG/M2 | WEIGHT: 200.1 LBS | TEMPERATURE: 98.6 F | OXYGEN SATURATION: 100 % | RESPIRATION RATE: 16 BRPM

## 2021-07-27 DIAGNOSIS — I10 ESSENTIAL HYPERTENSION: ICD-10-CM

## 2021-07-27 DIAGNOSIS — Z02.89 MEDICATION MANAGEMENT CONTRACT AGREEMENT: ICD-10-CM

## 2021-07-27 DIAGNOSIS — F31.81 BIPOLAR 2 DISORDER, MAJOR DEPRESSIVE EPISODE (HCC): Primary | ICD-10-CM

## 2021-07-27 DIAGNOSIS — F40.01 PANIC DISORDER WITH AGORAPHOBIA: ICD-10-CM

## 2021-07-27 DIAGNOSIS — O13.3 GESTATIONAL HTN, THIRD TRIMESTER: ICD-10-CM

## 2021-07-27 DIAGNOSIS — F41.9 ANXIETY AND DEPRESSION: ICD-10-CM

## 2021-07-27 DIAGNOSIS — F32.A ANXIETY AND DEPRESSION: ICD-10-CM

## 2021-07-27 PROCEDURE — 99214 OFFICE O/P EST MOD 30 MIN: CPT | Performed by: FAMILY MEDICINE

## 2021-07-27 RX ORDER — CLONAZEPAM 1 MG/1
1 TABLET ORAL
Qty: 60 TABLET | Refills: 2 | Status: SHIPPED | OUTPATIENT
Start: 2021-07-27

## 2021-07-27 RX ORDER — LABETALOL 100 MG/1
TABLET, FILM COATED ORAL
Qty: 30 TABLET | Refills: 2
Start: 2021-07-27

## 2021-07-27 RX ORDER — NORETHINDRONE ACETATE AND ETHINYL ESTRADIOL 1; .02 MG/1; MG/1
1 TABLET ORAL DAILY
COMMUNITY

## 2021-07-27 RX ORDER — AMLODIPINE BESYLATE 10 MG/1
10 TABLET ORAL DAILY
Qty: 90 TABLET | Refills: 1 | Status: SHIPPED | OUTPATIENT
Start: 2021-07-27

## 2021-07-27 NOTE — LETTER
NOTIFICATION RETURN TO WORK / SCHOOL    7/27/2021 3:11 PM    Ms. Marva Okeefe  [address] [place] [id number]      To Whom It May Concern:    Marva Okeefe is currently under the care of [address] OFFICE-[facility]. She will return to work/school on: 7/28/21    If there are questions or concerns please have the patient contact our office.         Sincerely,      Aurelia Escalona MD

## 2021-07-27 NOTE — PROGRESS NOTES
1. Have you been to the ER, urgent care clinic since your last visit? Hospitalized since your last visit? No    2. Have you seen or consulted any other health care providers outside of the [address] [place] [address] since your last visit? Include any pap smears or colon screening. No      Chief Complaint   Patient presents with    Numbness     Pt c/o facial numbness. Patient identity verified with two types of identifiers. Pt thinks it could be related to her recent covid vaccination.

## 2021-07-27 NOTE — PROGRESS NOTES
HISTORY OF PRESENT ILLNESS  Michelle Tamayo is a 29 y.o. female. \  Pt's main concerns were provided on virtual visit, a telemed format,  pt is w/ comorbid medical history and unaware of been exposed to covid-19 individual, Pt Have been staying at home for couple of wks,  pt has no fever no cough no dyspnea, no ha, not dizzy, nl smell nl taste, no N/V/D, no body ache, patient is concerned about the available COVID-19 vaccinations patient does not have enough information for which one to be available at this time, patient need to know each vaccination side effect how effective they are on any dosages as to be given where the vaccinations are available patient is willing to get vaccinated just requesting further information  Last dose was on 7/22/2021 Pfizer, HA   HDL Cholesterol MG/DL 29     LDL, calculated 0 - 100 MG/.8    Today pt also present for Bp check, for which the pt has not been compliant w/ the bp meds, off labetalol in addition pt not trying to the best to have a low salt diet and to be as active as possible,   pt sometimes obtain the bp with the average of  >140/90,  At this time, pt denies  Having Chest Pain, has no legs swelling and not feeling lightheadedness, in addition, the pat has not been feeling anxious, and  Has not been stressed out,      patient presents for follow-up regarding the anxiety and panic states of mind, for which the patient gets sudden periods of intense tingling sensation of the left arm and legs,  include palpitations, sweating, shaking, shortness of breath, numbness, and then pt feels like a feeling that something bad is going to happen.  Pt states that these sometimes happens within minutes, usually  they last for about few min  but each time duration varies from seconds to hours, then worsens by a fear of losing control and getting chest pain thinking that is either heart related or into a stroke level,   Pt not on any Etoh nor illicit drug never abused any prescription medications currently have to tablets left from previous prescription, patient is not asthmatic and has no hx of sudden death, nor early hx of heart dz in the family but recently lost few family member due to Covid-19, requesting a refill for this serious medical condition        Current Outpatient Medications   Medication Sig Dispense Refill    norethindrone-ethinyl estradiol (Junel 1/20, 21,) 1-20 mg-mcg tablet Take 1 Tablet by mouth daily.  amLODIPine (NORVASC) 10 mg tablet Take 1 Tablet by mouth daily. 30 Tablet 1    labetaloL (NORMODYNE) 100 mg tablet Please take half a tablet twice daily in addition to the 300 mg tid, 30 Tablet 2    DULoxetine (CYMBALTA) 30 mg capsule Take 1 Capsule by mouth daily. 30 Capsule 3    lamoTRIgine (LaMICtal) 100 mg tablet Take 1 Tablet by mouth two (2) times a day. 60 Tablet 3    nortriptyline (PAMELOR) 25 mg capsule Take 1 Cap by mouth nightly. 30 Cap 2    diclofenac (VOLTAREN) 1 % gel Apply 4 g to affected area four (4) times daily. (Patient taking differently: Apply 4 g to affected area four (4) times daily. As needed) 100 g 3    clonazePAM (KlonoPIN) 1 mg tablet Take 1 Tab by mouth two (2) times daily as needed for Anxiety or Sleep. Max Daily Amount: 2 mg. 60 Tab 2    loratadine (CLARITIN) 10 mg tablet TAKE 1 TABLET BY MOUTH DAILY AS NEEDED FOR ALLERGIES. 30 Tab 1    albuterol (PROVENTIL HFA, VENTOLIN HFA, PROAIR HFA) 90 mcg/actuation inhaler Take 1 Puff by inhalation every four (4) hours as needed for Wheezing. 1 Inhaler 3    potassium chloride (K-DUR, KLOR-CON) 20 mEq tablet Take 1 Tab by mouth daily.  (Patient not taking: Reported on 5/25/2021) 15 Tab 0     Allergies   Allergen Reactions    Codeine Other (comments)     Hallucinations, raise blood pressure     Hydrocodone Hives    Lortab [Hydrocodone-Acetaminophen] Hives and Itching    Tramadol Nausea Only     Headache     Past Medical History:   Diagnosis Date    Anxiety disorder     Arthritis     Asthma  Bipolar 1 disorder ([facility] [address]     Depression     Frequent headaches     Frequent headaches     Hypertension     Migraine     Muscle pain     Other ill-defined conditions(799.89)     dysplagia    Psychiatric disorder     bipolar, anxiety, depression, PTSD    PTSD (post-traumatic stress disorder)      Past Surgical History:   Procedure Laterality Date    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    HX GYN  3-    birth   [de-identified] HX OTHER SURGICAL      Dysplasia     Family History   Problem Relation Age of Onset    Cancer Mother     Diabetes Father     Other Father         Aneursym   [de-identified] Migraines Sister      Social History     Tobacco Use    Smoking status: Current Every Day Smoker     Packs/day: 0.50     Years: 14.00     Pack years: 7.00     Types: Cigarettes     Start date: 2006    Smokeless tobacco: Never Used   Substance Use Topics    Alcohol use: Yes     Alcohol/week: 0.0 standard drinks     Comment: social      Lab Results   Component Value Date/Time    Hemoglobin A1c 5.6 06/09/2021 10:46 AM    Hemoglobin A1c 5.3 09/10/2014 12:22 PM    Glucose 106 (H) 06/09/2021 10:46 AM    LDL, calculated 130.8 (H) 06/09/2021 10:46 AM    Creatinine 0.84 06/09/2021 10:46 AM      Lab Results   Component Value Date/Time    Cholesterol, total 181 06/09/2021 10:46 AM    HDL Cholesterol 29 06/09/2021 10:46 AM    LDL, calculated 130.8 (H) 06/09/2021 10:46 AM    Triglyceride 106 06/09/2021 10:46 AM    CHOL/HDL Ratio 6.2 (H) 06/09/2021 10:46 AM        Review of Systems   Constitutional: Negative for chills and fever. HENT: Negative for congestion and nosebleeds. Eyes: Negative for blurred vision and pain. Respiratory: Negative for cough, shortness of breath and wheezing. Cardiovascular: Negative for chest pain and leg swelling. Gastrointestinal: Negative for constipation, diarrhea, nausea and vomiting. Genitourinary: Negative for dysuria and frequency.    Musculoskeletal: Negative for joint pain and myalgias. Skin: Negative for itching and rash. Neurological: Negative for dizziness, loss of consciousness and headaches. Psychiatric/Behavioral: Negative for depression. The patient is not nervous/anxious and does not have insomnia. Physical Exam  Vitals and nursing note reviewed. Constitutional:       Appearance: She is well-developed. HENT:      Head: Normocephalic and atraumatic. Eyes:      Conjunctiva/sclera: Conjunctivae normal.      Pupils: Pupils are equal, round, and reactive to light. Neck:      Thyroid: No thyromegaly. Vascular: No JVD. Cardiovascular:      Rate and Rhythm: Normal rate and regular rhythm. Heart sounds: Normal heart sounds. No murmur heard. No friction rub. No gallop. Pulmonary:      Effort: Pulmonary effort is normal. No respiratory distress. Breath sounds: Normal breath sounds. No stridor. No wheezing or rales. Abdominal:      General: Bowel sounds are normal. There is no distension. Palpations: Abdomen is soft. There is no mass. Tenderness: There is no abdominal tenderness. Musculoskeletal:         General: No tenderness. Normal range of motion. Lymphadenopathy:      Cervical: No cervical adenopathy. Skin:     Findings: No erythema or rash. Neurological:      Mental Status: She is alert and oriented to person, place, and time. Cranial Nerves: No cranial nerve deficit. Deep Tendon Reflexes: Reflexes are normal and symmetric. Psychiatric:         Behavior: Behavior normal.         ASSESSMENT and PLAN  Diagnoses and all orders for this visit:    1. Bipolar 2 disorder, major depressive episode (HCC)  -     clonazePAM (KlonoPIN) 1 mg tablet; Take 1 Tablet by mouth two (2) times daily as needed for Anxiety or Sleep. Max Daily Amount: 2 mg. 2. Essential hypertension  -     amLODIPine (NORVASC) 10 mg tablet; Take 1 Tablet by mouth daily.   -     labetaloL (NORMODYNE) 100 mg tablet; Please take half a tablet twice daily in addition to the 300 mg tid,    3. Anxiety and depression  -     amLODIPine (NORVASC) 10 mg tablet; Take 1 Tablet by mouth daily. -     labetaloL (NORMODYNE) 100 mg tablet; Please take half a tablet twice daily in addition to the 300 mg tid,  -     clonazePAM (KlonoPIN) 1 mg tablet; Take 1 Tablet by mouth two (2) times daily as needed for Anxiety or Sleep. Max Daily Amount: 2 mg. 4. Gestational HTN, third trimester  -     amLODIPine (NORVASC) 10 mg tablet; Take 1 Tablet by mouth daily. -     labetaloL (NORMODYNE) 100 mg tablet; Please take half a tablet twice daily in addition to the 300 mg tid,    5. Medication management contract agreement  -     PAIN MGMT PANEL, URINE W/RFLX CONFIRM; Future    6.  Panic disorder with agoraphobia    compliancy readvised,       Patient was told that the medication is not safe was told not to operate any machinery while taking the medication meanwhile emphasized that the pt should take the medication as needed not on a regular basis avoid alcohol intake and avoid other medication that could potentiate its effect, regardless patient was told any other medication given by any other doctor the pt need to call primary care for further advice` patient acknowledged understanding and agreed with today's recommendation

## 2021-08-05 ENCOUNTER — VIRTUAL VISIT (OUTPATIENT)
Dept: FAMILY MEDICINE CLINIC | Age: 36
End: 2021-08-05
Payer: MEDICAID

## 2021-08-05 DIAGNOSIS — Z20.822 SUSPECTED COVID-19 VIRUS INFECTION: ICD-10-CM

## 2021-08-05 DIAGNOSIS — Z71.89 ADVICE GIVEN ABOUT COVID-19 VIRUS INFECTION: Primary | ICD-10-CM

## 2021-08-05 DIAGNOSIS — F41.9 ANXIETY AND DEPRESSION: ICD-10-CM

## 2021-08-05 DIAGNOSIS — F32.A ANXIETY AND DEPRESSION: ICD-10-CM

## 2021-08-05 PROCEDURE — 99213 OFFICE O/P EST LOW 20 MIN: CPT | Performed by: FAMILY MEDICINE

## 2021-08-05 RX ORDER — METHYLPREDNISOLONE 4 MG/1
TABLET ORAL
Qty: 1 DOSE PACK | Refills: 0 | Status: SHIPPED | OUTPATIENT
Start: 2021-08-05

## 2021-08-05 RX ORDER — AZITHROMYCIN 250 MG/1
TABLET, FILM COATED ORAL
Qty: 6 TABLET | Refills: 0 | Status: SHIPPED | OUTPATIENT
Start: 2021-08-05

## 2021-08-05 NOTE — PROGRESS NOTES
HISTORY OF PRESENT ILLNESS  Maddy Fontana is a 29 y.o. female. 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 unfortunately not able to do so patient currently have hoarseness next cough mostly dry shortness of breath feeling lightheadedness and Dz for last couple of days pt has no low grade fever,  nl smell nl taste, patient also complaining of some nausea feeling but no vomiting diarrhea, no body ache , and no orbital pain, no rash, patient also states that she does not have a good family support at this time    Patient without the diabetic state currently on clonazepam Cymbalta Lamictal nortriptyline requesting gabapentin any neuropathic complaint patient has been referred multiple times to a psychiatrist unfortunately not compliant and not seen 1 so far,            Current Outpatient Medications   Medication Sig Dispense Refill    norethindrone-ethinyl estradiol (Junel 1/20, 21,) 1-20 mg-mcg tablet Take 1 Tablet by mouth daily.  amLODIPine (NORVASC) 10 mg tablet Take 1 Tablet by mouth daily. 90 Tablet 1    labetaloL (NORMODYNE) 100 mg tablet Please take half a tablet twice daily in addition to the 300 mg tid, 30 Tablet 2    clonazePAM (KlonoPIN) 1 mg tablet Take 1 Tablet by mouth two (2) times daily as needed for Anxiety or Sleep. Max Daily Amount: 2 mg. 60 Tablet 2    DULoxetine (CYMBALTA) 30 mg capsule Take 1 Capsule by mouth daily. 30 Capsule 3    lamoTRIgine (LaMICtal) 100 mg tablet Take 1 Tablet by mouth two (2) times a day. 60 Tablet 3    nortriptyline (PAMELOR) 25 mg capsule Take 1 Cap by mouth nightly.  30 Cap 2    diclofenac (VOLTAREN) 1 % gel Apply 4 g to affected area four (4) times daily. (Patient taking differently: Apply 4 g to affected area four (4) times daily. As needed) 100 g 3    loratadine (CLARITIN) 10 mg tablet TAKE 1 TABLET BY MOUTH DAILY AS NEEDED FOR ALLERGIES. 30 Tab 1    albuterol (PROVENTIL HFA, VENTOLIN HFA, PROAIR HFA) 90 mcg/actuation inhaler Take 1 Puff by inhalation every four (4) hours as needed for Wheezing. 1 Inhaler 3    potassium chloride (K-DUR, KLOR-CON) 20 mEq tablet Take 1 Tab by mouth daily. (Patient not taking: Reported on 8/5/2021) 15 Tab 0     Allergies   Allergen Reactions    Codeine Other (comments)     Hallucinations, raise blood pressure     Hydrocodone Hives    Lortab [Hydrocodone-Acetaminophen] Hives and Itching    Tramadol Nausea Only     Headache     Past Medical History:   Diagnosis Date    Anxiety disorder     Arthritis     Asthma     Bipolar 1 disorder (HCC)     Depression     Frequent headaches     Frequent headaches     Hypertension     Migraine     Muscle pain     Other ill-defined conditions(799.89)     dysplagia    Psychiatric disorder     bipolar, anxiety, depression, PTSD    PTSD (post-traumatic stress disorder)      Past Surgical History:   Procedure Laterality Date    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    HX GYN  3-    birth   Armida Brunner HX OTHER SURGICAL      Dysplasia     Family History   Problem Relation Age of Onset    Cancer Mother     Diabetes Father     Other Father         Aneursym   Armida Brunner Migraines Sister      Social History     Tobacco Use    Smoking status: Current Every Day Smoker     Packs/day: 0.50     Years: 14.00     Pack years: 7.00     Types: Cigarettes     Start date: 2006    Smokeless tobacco: Never Used   Substance Use Topics    Alcohol use:  Yes     Alcohol/week: 0.0 standard drinks     Comment: social      Lab Results   Component Value Date/Time    WBC 6.2 06/09/2021 10:46 AM    HGB 12.1 06/09/2021 10:46 AM    HCT 39.0 06/09/2021 10:46 AM    PLATELET 250 81/21/5208 10:46 AM    MCV 83.5 06/09/2021 10:46 AM     Lab Results   Component Value Date/Time    ALT (SGPT) 36 06/09/2021 10:46 AM    Alk. phosphatase 101 06/09/2021 10:46 AM    Bilirubin, total 0.3 06/09/2021 10:46 AM    Albumin 4.1 06/09/2021 10:46 AM    Protein, total 7.9 06/09/2021 10:46 AM    PLATELET 565 69/33/2539 10:46 AM        Review of Systems   Constitutional: Negative for chills, fever and malaise/fatigue. HENT: Negative for nosebleeds. Eyes: Negative for pain. Respiratory: Negative for cough and wheezing. Cardiovascular: Negative for chest pain and leg swelling. Gastrointestinal: Negative for constipation, diarrhea and nausea. Genitourinary: Negative for frequency. Musculoskeletal: Negative for joint pain and myalgias. Skin: Negative for rash. Neurological: Negative for loss of consciousness and headaches. Endo/Heme/Allergies: Does not bruise/bleed easily. Psychiatric/Behavioral: Negative for depression. The patient is not nervous/anxious and does not have insomnia. All other systems reviewed and are negative. Physical Exam  Vitals and nursing note reviewed. Constitutional:       Appearance: She is well-developed. HENT:      Head: Normocephalic and atraumatic. Nose: Congestion present. Neck:      Thyroid: No thyromegaly. Vascular: No JVD. Pulmonary:      Breath sounds: No stridor. Abdominal:      General: There is no distension. Palpations: There is no mass. Musculoskeletal:         General: Tenderness present. Skin:     Findings: Erythema present. Neurological:      Mental Status: She is alert and oriented to person, place, and time. Cranial Nerves: No cranial nerve deficit. Deep Tendon Reflexes: Reflexes are normal and symmetric. Psychiatric:         Behavior: Behavior normal.          ASSESSMENT and PLAN  Diagnoses and all orders for this visit:    1. Advice given about COVID-19 virus infection    2.  Suspected COVID-19 virus infection  - methylPREDNISolone (MEDROL DOSEPACK) 4 mg tablet; As directed for 6 days one package  -     azithromycin (ZITHROMAX) 250 mg tablet; 2 first day then one tab daily till finished    3. Anxiety and depression  -     REFERRAL TO PSYCHIATRY       Concern abdout COVID-19 addressed and detailed, pt was told that the best way to prevent illness is by protection, to Wear a facemask , having social distance, and to get tested and re-tested, with contact tracing,    Pt was told that currently,there is no antiviral medication which is recommended to treat COVID-19. Current treatment is aimed to relieve sxs such as pain relievers no ibuprofen but mostly acetaminophen, anti Cough medication , plenty of Rest and a lot of oral hydration. Isolation and Contact tracing at this time     Also was advised to stay in isolation for 10-14 days at this time with cold sxs presentation, Pt was also told if develop dyspnea needs to call 911 or go to er, call for prem advise, pt agreed with today's visit. Pursuant to the emergency declaration under the [address] and [place] [address] waiver authority and the [employer] and [employer] Act, this Virtual Visit was conducted, with patient's consent, to reduce the patient's risk of exposure to COVID-19 and provide continuity of care for an established patient. Today's recommendations, Services were provided through a Video synchronous discussion virtually to substitute for in-person appointment.

## 2021-08-05 NOTE — PROGRESS NOTES
Chief Complaint   Patient presents with    Cold Symptoms     1. Have you been to the ER, urgent care clinic since your last visit? Hospitalized since your last visit? No    2. Have you seen or consulted any other health care providers outside of the [address] [place] [address] since your last visit? Include any pap smears or colon screening. No'    Call placed to pt.  Coughing up green sputum x 2 weeks, fully covid vaccinated ,Denies flu like symptoms,  Denies fever, chills,  Denies covid exposure, lms 7/29/21  Also requesting gabapentin for tingling in hands and feet,

## 2021-09-03 ENCOUNTER — DOCUMENTATION ONLY (OUTPATIENT)
Dept: FAMILY MEDICINE CLINIC | Age: 36
End: 2021-09-03

## 2021-10-06 DIAGNOSIS — F41.9 ANXIETY AND DEPRESSION: ICD-10-CM

## 2021-10-06 DIAGNOSIS — F32.A ANXIETY AND DEPRESSION: ICD-10-CM

## 2021-10-06 DIAGNOSIS — G43.111 INTRACTABLE MIGRAINE WITH AURA WITH STATUS MIGRAINOSUS: ICD-10-CM

## 2021-10-07 RX ORDER — LABETALOL 300 MG/1
TABLET, FILM COATED ORAL
Qty: 90 TABLET | Refills: 2 | Status: SHIPPED | OUTPATIENT
Start: 2021-10-07

## 2021-10-07 RX ORDER — UBROGEPANT 50 MG/1
TABLET ORAL
Qty: 10 TABLET | Refills: 1 | Status: SHIPPED | OUTPATIENT
Start: 2021-10-07 | End: 2022-02-04

## 2021-11-18 RX ORDER — CETIRIZINE HCL 10 MG
10 TABLET ORAL
Qty: 30 TABLET | Refills: 1 | Status: SHIPPED | OUTPATIENT
Start: 2021-11-18

## 2021-11-18 RX ORDER — FLUTICASONE PROPIONATE 50 MCG
SPRAY, SUSPENSION (ML) NASAL
Qty: 1 EACH | Refills: 1 | Status: SHIPPED | OUTPATIENT
Start: 2021-11-18 | End: 2022-08-29

## 2022-02-03 DIAGNOSIS — F41.9 ANXIETY AND DEPRESSION: ICD-10-CM

## 2022-02-03 DIAGNOSIS — G43.111 INTRACTABLE MIGRAINE WITH AURA WITH STATUS MIGRAINOSUS: ICD-10-CM

## 2022-02-03 DIAGNOSIS — F32.A ANXIETY AND DEPRESSION: ICD-10-CM

## 2022-02-04 RX ORDER — UBROGEPANT 50 MG/1
TABLET ORAL
Qty: 10 TABLET | Refills: 1 | Status: SHIPPED | OUTPATIENT
Start: 2022-02-04 | End: 2022-06-21

## 2022-03-19 PROBLEM — F31.81 BIPOLAR 2 DISORDER, MAJOR DEPRESSIVE EPISODE (HCC): Status: ACTIVE | Noted: 2021-03-02

## 2022-04-01 ENCOUNTER — HOSPITAL ENCOUNTER (EMERGENCY)
Age: 37
Discharge: HOME | End: 2022-04-01
Attending: EMERGENCY MEDICINE
Payer: MEDICAID

## 2022-04-01 VITALS
WEIGHT: 193 LBS | HEIGHT: 62 IN | RESPIRATION RATE: 18 BRPM | SYSTOLIC BLOOD PRESSURE: 154 MMHG | BODY MASS INDEX: 35.51 KG/M2 | DIASTOLIC BLOOD PRESSURE: 119 MMHG | TEMPERATURE: 99.4 F | OXYGEN SATURATION: 98 % | HEART RATE: 96 BPM

## 2022-04-01 DIAGNOSIS — S21.012A LACERATION OF LEFT BREAST, INITIAL ENCOUNTER: ICD-10-CM
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
## 2022-04-01 DIAGNOSIS — Z29.14 ENCOUNTER FOR PROPHYLACTIC ADMINISTRATION OF RABIES IMMUNE GLOBULIN: ICD-10-CM

## 2022-04-01 PROCEDURE — 74011250637 HC RX REV CODE- 250/637: Performed by: NURSE PRACTITIONER

## 2022-04-01 PROCEDURE — 90675 RABIES VACCINE IM: CPT | Performed by: NURSE PRACTITIONER

## 2022-04-01 PROCEDURE — 74011000250 HC RX REV CODE- 250: Performed by: NURSE PRACTITIONER

## 2022-04-01 PROCEDURE — 74011250636 HC RX REV CODE- 250/636: Performed by: NURSE PRACTITIONER

## 2022-04-01 PROCEDURE — 90471 IMMUNIZATION ADMIN: CPT

## 2022-04-01 PROCEDURE — 75810000293 HC SIMP/SUPERF WND  RPR

## 2022-04-01 PROCEDURE — 99284 EMERGENCY DEPT VISIT MOD MDM: CPT

## 2022-04-01 PROCEDURE — 96372 THER/PROPH/DIAG INJ SC/IM: CPT

## 2022-04-01 PROCEDURE — 90375 RABIES IG IM/SC: CPT | Performed by: NURSE PRACTITIONER

## 2022-04-01 RX ORDER — IBUPROFEN 400 MG/1
800 TABLET ORAL
Status: COMPLETED | OUTPATIENT
Start: 2022-04-01 | End: 2022-04-01

## 2022-04-01 RX ORDER — LIDOCAINE HYDROCHLORIDE 20 MG/ML
10 INJECTION, SOLUTION INFILTRATION; PERINEURAL
Status: COMPLETED | OUTPATIENT
Start: 2022-04-01 | End: 2022-04-01

## 2022-04-01 RX ORDER — IBUPROFEN 800 MG/1
800 TABLET ORAL
Qty: 20 TABLET | Refills: 0 | Status: SHIPPED | OUTPATIENT
Start: 2022-04-01 | End: 2022-04-08

## 2022-04-01 RX ORDER — AMOXICILLIN AND CLAVULANATE POTASSIUM 875; 125 MG/1; MG/1
1 TABLET, FILM COATED ORAL 2 TIMES DAILY
Qty: 14 TABLET | Refills: 0 | Status: SHIPPED | OUTPATIENT
Start: 2022-04-01 | End: 2023-01-13 | Stop reason: ALTCHOICE

## 2022-04-01 RX ORDER — LIDOCAINE 40 MG/G
CREAM TOPICAL
Status: COMPLETED | OUTPATIENT
Start: 2022-04-01 | End: 2022-04-01

## 2022-04-01 RX ADMIN — IBUPROFEN 800 MG: 400 TABLET, FILM COATED ORAL at 15:40

## 2022-04-01 RX ADMIN — LIDOCAINE HYDROCHLORIDE 200 MG: 20 INJECTION, SOLUTION INFILTRATION; PERINEURAL at 14:54

## 2022-04-01 RX ADMIN — LIDOCAINE: 40 CREAM TOPICAL at 14:52

## 2022-04-01 RX ADMIN — Medication 2.5 UNITS: at 14:57

## 2022-04-01 RX ADMIN — RABIES IMMUNE GLOBULIN (HUMAN) 1740 UNITS: 300 INJECTION, SOLUTION INFILTRATION; INTRAMUSCULAR at 15:38

## 2022-04-01 NOTE — ED PROVIDER NOTES
EMERGENCY DEPARTMENT HISTORY AND PHYSICAL EXAM      Date: 4/1/2022  Patient Name: Nina Man    History of Presenting Illness     Chief Complaint   Patient presents with    Dog Bite       History Provided By: Patient    Additional History (Context): Nina Man is a 40 y.o. female with Asthma, HTN, Bipolar, Anxiety, Depression, PTSD, Migraine, Anxiety,  who presents with dog bite. Incident occurred 2 hours PTA. Pt reports running in the park and attacked by unknown dog. She reports bite to the L breast causing a laceration. Associated with pain. No drainage, redness, fever or chills. She has not taken anything for pain. She reports her tdap is UTD. PCP: Meena Sifuentes MD    Current Outpatient Medications   Medication Sig Dispense Refill    amoxicillin-clavulanate (Augmentin) 875-125 mg per tablet Take 1 Tablet by mouth two (2) times a day. 14 Tablet 0    ibuprofen (MOTRIN) 800 mg tablet Take 1 Tablet by mouth every six (6) hours as needed for Pain for up to 7 days. 20 Tablet 0    Ubrelvy 50 mg tablet TAKE 1 TABLET BY MOUTH AT ONCE AS NEEDED FOR MIRGRAIN. MAX DAILY: 4 TABLETS 10 Tablet 1    cetirizine (ZYRTEC) 10 mg tablet Take 1 Tablet by mouth daily as needed for Allergies. 30 Tablet 1    fluticasone propionate (FLONASE) 50 mcg/actuation nasal spray One pump to each nostril daily 1 Each 1    labetaloL (NORMODYNE) 300 mg tablet TAKE 1 TABLET BY MOUTH THREE TIMES A DAY 90 Tablet 2    methylPREDNISolone (MEDROL DOSEPACK) 4 mg tablet As directed for 6 days one package 1 Dose Pack 0    azithromycin (ZITHROMAX) 250 mg tablet 2 first day then one tab daily till finished 6 Tablet 0    norethindrone-ethinyl estradiol (Junel 1/20, 21,) 1-20 mg-mcg tablet Take 1 Tablet by mouth daily.  amLODIPine (NORVASC) 10 mg tablet Take 1 Tablet by mouth daily.  90 Tablet 1    labetaloL (NORMODYNE) 100 mg tablet Please take half a tablet twice daily in addition to the 300 mg tid, 30 Tablet 2    clonazePAM (KlonoPIN) 1 mg tablet Take 1 Tablet by mouth two (2) times daily as needed for Anxiety or Sleep. Max Daily Amount: 2 mg. 60 Tablet 2    DULoxetine (CYMBALTA) 30 mg capsule Take 1 Capsule by mouth daily. 30 Capsule 3    lamoTRIgine (LaMICtal) 100 mg tablet Take 1 Tablet by mouth two (2) times a day. 60 Tablet 3    potassium chloride (K-DUR, KLOR-CON) 20 mEq tablet Take 1 Tab by mouth daily. (Patient not taking: Reported on 8/5/2021) 15 Tab 0    nortriptyline (PAMELOR) 25 mg capsule Take 1 Cap by mouth nightly. 30 Cap 2    diclofenac (VOLTAREN) 1 % gel Apply 4 g to affected area four (4) times daily. (Patient taking differently: Apply 4 g to affected area four (4) times daily. As needed) 100 g 3    loratadine (CLARITIN) 10 mg tablet TAKE 1 TABLET BY MOUTH DAILY AS NEEDED FOR ALLERGIES. 30 Tab 1    albuterol (PROVENTIL HFA, VENTOLIN HFA, PROAIR HFA) 90 mcg/actuation inhaler Take 1 Puff by inhalation every four (4) hours as needed for Wheezing. 1 Inhaler 3       Past History     Past Medical History:  Past Medical History:   Diagnosis Date    Anxiety disorder     Arthritis     Asthma     Bipolar 1 disorder ([address]     Depression     Frequent headaches     Frequent headaches     Hypertension     Migraine     Muscle pain     Other ill-defined conditions(799.89)     dysplagia    Psychiatric disorder     bipolar, anxiety, depression, PTSD    PTSD (post-traumatic stress disorder)        Past Surgical History:  Past Surgical History:   Procedure Laterality Date    HX AMPUTATION Left     left hand middle finger.  8/2014    HX GYN  3-    birth    HX OTHER SURGICAL      Dysplasia       Family History:  Family History   Problem Relation Age of Onset    Cancer Mother     Diabetes Father     Other Father         Aneursym   Sidra Current Migraines Sister        Social History:  Social History     Tobacco Use    Smoking status: Current Every Day Smoker     Packs/day: 0.50     Years: 14.00     Pack years: 7.00 Types: Cigarettes     Start date: 2006    Smokeless tobacco: Never Used   Vaping Use    Vaping Use: Never used   Substance Use Topics    Alcohol use: Yes     Alcohol/week: 0.0 standard drinks     Comment: social    Drug use: Yes     Types: Marijuana     Comment: occassional       Allergies: Allergies   Allergen Reactions    Codeine Other (comments)     Hallucinations, raise blood pressure     Hydrocodone Hives    Lortab [Hydrocodone-Acetaminophen] Hives and Itching    Tramadol Nausea Only     Headache         Review of Systems   Review of Systems   Constitutional: Negative for appetite change, chills, fatigue and fever. HENT: Negative for congestion, ear pain, rhinorrhea and sore throat. Eyes: Negative for pain and itching. Respiratory: Negative for cough, chest tightness, shortness of breath and wheezing. Cardiovascular: Negative for chest pain, palpitations and leg swelling. Gastrointestinal: Negative for abdominal pain, nausea and vomiting. Genitourinary: Negative for dysuria, frequency and urgency. Musculoskeletal: Negative for arthralgias, back pain and joint swelling. Skin: Positive for wound. Negative for color change and rash. Neurological: Negative for dizziness, numbness and headaches. All other systems reviewed and are negative. Physical Exam     Vitals:    04/01/22 1350   BP: (!) 154/119   Pulse: 96   Resp: 18   Temp: 99.4 °F (37.4 °C)   SpO2: 98%   Weight: 87.5 kg (193 lb)   Height: 5' 2\" (1.575 m)     Physical Exam  Vitals and nursing note reviewed. Constitutional:       General: She is not in acute distress. Appearance: She is well-developed. She is obese. She is not ill-appearing. HENT:      Head: Normocephalic and atraumatic. Right Ear: Tympanic membrane and ear canal normal.      Left Ear: Tympanic membrane and ear canal normal.      Nose: Nose normal.      Mouth/Throat:      Mouth: Mucous membranes are moist.      Pharynx: Oropharynx is clear.  No oropharyngeal exudate or posterior oropharyngeal erythema. Eyes:      Extraocular Movements: Extraocular movements intact. Conjunctiva/sclera: Conjunctivae normal.      Pupils: Pupils are equal, round, and reactive to light. Cardiovascular:      Rate and Rhythm: Normal rate and regular rhythm. Pulses: Normal pulses. Heart sounds: Normal heart sounds. Pulmonary:      Effort: Pulmonary effort is normal.      Breath sounds: Normal breath sounds. Musculoskeletal:         General: Normal range of motion. Cervical back: Normal range of motion and neck supple. Skin:     General: Skin is warm and dry. Findings: Laceration (1 cm horizontal and 3 cm horizontal to L breast with superficial abrasion ) present. Comments: See attached pictures    Neurological:      Mental Status: She is alert and oriented to person, place, and time. GCS: GCS eye subscore is 4. GCS verbal subscore is 5. GCS motor subscore is 6. Cranial Nerves: Cranial nerves are intact. Sensory: Sensation is intact. Motor: Motor function is intact. Coordination: Coordination is intact. Gait: Gait is intact. Diagnostic Study Results     Labs -   No results found for this or any previous visit (from the past 12 hour(s)). Radiologic Studies -   No orders to display     CT Results  (Last 48 hours)    None        CXR Results  (Last 48 hours)    None            Medical Decision Making   I am the first provider for this patient. I reviewed the vital signs, available nursing notes, past medical history, past surgical history, family history and social history. Vital Signs-Reviewed the patient's vital signs. Records Reviewed: Nursing Notes and Old Medical Records     40year old F presenting with dog bite exhibiting laceration to the L breast. Plan to administer rabies vaccine and IG due to unknown vaccination status.  Will consult case management for scheduling assistance for rabies vaccine. HYPERTENSION COUNSELING  For 10 minutes, education was provided to the patient today regarding their hypertension. Patient is made aware of their elevated blood pressure and is instructed to follow up with their PCP. Patient is counseled regarding consequences of chronic, uncontrolled hypertension including kidney disease, heart disease, stroke or even death. Patient states their understanding and agrees to follow up this week. I reviewed heart healthy diet in addition to exercise for blood pressure management. The patient verbalized understanding    ED Course:          Disposition:  Discharge     DISCHARGE NOTE:     Pt has been reexamined. Patient has no new complaints, changes, or physical findings. Care plan outlined and precautions discussed. All of pt's questions and concerns were addressed. Patient was instructed and agrees to follow up with PCP, as well as to return to the ED upon further deterioration. Patient is ready to go home. Follow-up Information     Follow up With Specialties Details Why [address] [facility] EMERGENCY DEPT Emergency Medicine In 3 days Day 3 (April 4), 7(April 8) and 14 (April 15)-- Rabies Vaccine [address] [facility] [place] EMERGENCY DEPT Emergency Medicine  For suture removal in 7-10 days Nathaly 27          Discharge Medication List as of 4/1/2022  3:59 PM      START taking these medications    Details   amoxicillin-clavulanate (Augmentin) 875-125 mg per tablet Take 1 Tablet by mouth two (2) times a day., Normal, Disp-14 Tablet, R-0      ibuprofen (MOTRIN) 800 mg tablet Take 1 Tablet by mouth every six (6) hours as needed for Pain for up to 7 days. , Normal, Disp-20 Tablet, R-0         CONTINUE these medications which have NOT CHANGED    Details   Ubrelvy 50 mg tablet TAKE 1 TABLET BY MOUTH AT ONCE AS NEEDED FOR MIRGRAIN. MAX DAILY: 4 TABLETS, Normal, Disp-10 Tablet, R-1 cetirizine (ZYRTEC) 10 mg tablet Take 1 Tablet by mouth daily as needed for Allergies. , Normal, Disp-30 Tablet, R-1      fluticasone propionate (FLONASE) 50 mcg/actuation nasal spray One pump to each nostril daily, Normal, Disp-1 Each, R-1      !! labetaloL (NORMODYNE) 300 mg tablet TAKE 1 TABLET BY MOUTH THREE TIMES A DAY, Normal, Disp-90 Tablet, R-2      methylPREDNISolone (MEDROL DOSEPACK) 4 mg tablet As directed for 6 days one package, Normal, Disp-1 Dose Pack, R-0      azithromycin (ZITHROMAX) 250 mg tablet 2 first day then one tab daily till finished, Normal, Disp-6 Tablet, R-0      norethindrone-ethinyl estradiol (Junel 1/20, 21,) 1-20 mg-mcg tablet Take 1 Tablet by mouth daily. , Historical Med      amLODIPine (NORVASC) 10 mg tablet Take 1 Tablet by mouth daily. , Normal, Disp-90 Tablet, R-1      !! labetaloL (NORMODYNE) 100 mg tablet Please take half a tablet twice daily in addition to the 300 mg tid,, No Print, Disp-30 Tablet, R-2      clonazePAM (KlonoPIN) 1 mg tablet Take 1 Tablet by mouth two (2) times daily as needed for Anxiety or Sleep. Max Daily Amount: 2 mg., Print, Disp-60 Tablet, R-2      DULoxetine (CYMBALTA) 30 mg capsule Take 1 Capsule by mouth daily. , Normal, Disp-30 Capsule, R-3      lamoTRIgine (LaMICtal) 100 mg tablet Take 1 Tablet by mouth two (2) times a day., Normal, Disp-60 Tablet, R-3      potassium chloride (K-DUR, KLOR-CON) 20 mEq tablet Take 1 Tab by mouth daily. , Normal, Disp-15 Tab, R-0      nortriptyline (PAMELOR) 25 mg capsule Take 1 Cap by mouth nightly., Normal, Disp-30 Cap, R-2      diclofenac (VOLTAREN) 1 % gel Apply 4 g to affected area four (4) times daily. , Normal, Disp-100 g, R-3      loratadine (CLARITIN) 10 mg tablet TAKE 1 TABLET BY MOUTH DAILY AS NEEDED FOR ALLERGIES., Normal, Disp-30 Tab, R-1      albuterol (PROVENTIL HFA, VENTOLIN HFA, PROAIR HFA) 90 mcg/actuation inhaler Take 1 Puff by inhalation every four (4) hours as needed for Wheezing., Normal, Disp-1 Inhaler, R-3       !! - Potential duplicate medications found. Please discuss with provider. Provider Notes (Medical Decision Making):     Procedures:  Wound Repair    Date/Time: 4/1/2022 4:11 PM  Performed by: NPPreparation: skin prepped with Betadine and sterile field established  Location details: chest (L breast )  Wound length:2.6 - 7.5 cm  Anesthesia: local infiltration    Anesthesia:  Local Anesthetic: lidocaine 1% without epinephrine and topical anesthetic  Anesthetic total: 5 mL  Foreign bodies: no foreign bodies  Irrigation solution: saline  Debridement: none  Skin closure: 5-0 nylon  Number of sutures: 6  Technique: simple  Approximation: close  Lip approximation: vermillion border well aligned  Dressing: antibiotic ointment  Patient tolerance: patient tolerated the procedure well with no immediate complications  My total time at bedside, performing this procedure was 31-45 minutes. Diagnosis     Clinical Impression:   1. Dog bite, initial encounter    2. Encounter for prophylactic administration of rabies immune globulin    3.  Laceration of left breast, initial encounter

## 2022-04-01 NOTE — ED NOTES
Emergency Department Nursing Plan of Care       The Nursing Plan of Care is developed from the Nursing assessment and Emergency Department Attending provider initial evaluation. The plan of care may be reviewed in the ED Provider note.     The Plan of Care was developed with the following considerations:   Patient / Family readiness to learn indicated by:verbalized understanding  Persons(s) to be included in education: patient  Barriers to Learning/Limitations:No    Signed     Stefanie Thomas RN    4/1/2022   2:21 PM

## 2022-04-01 NOTE — ED TRIAGE NOTES
Patient presents to ED with c/o dog bite to left breast. Patient states that she was out jogging and was attacked by a stray dog

## 2022-04-01 NOTE — DISCHARGE INSTRUCTIONS
PLEASE FOLLOW THE SCHEDULE FOR YOUR NEXT DOSE THAT WAS PROVIDED TO YOU       It was a pleasure taking care of you at [facility] Emergency Department today. We know that when you come to [facility], you are entrusting us with your health, comfort, and safety. Our physicians and nurses honor that trust, and we truly appreciate the opportunity to care for you and your loved ones. We also value our feedback. If you receive a survey about your Emergency Department experience today, please fill it out. We care about our patients' feedback, and we listen to what you have to say. Thank you!

## 2022-04-01 NOTE — LETTER
[facility] EMERGENCY DEPT  [address] [address] [phone]    Work/School Note    Date: 4/1/2022    To Whom It May concern:    Rush Tirado was seen and treated today in the emergency room by the following provider(s):  Attending Provider: Jose Armando Velásquez MD  Nurse Practitioner: Elan Douglas NP. Rush Tirado treated today for injury. Please excuse her for late arrival to interview.      Sincerely,          Avni Alan NP

## 2022-04-04 ENCOUNTER — HOSPITAL ENCOUNTER (OUTPATIENT)
Dept: INFUSION THERAPY | Age: 37
Discharge: HOME | End: 2022-04-04
Payer: MEDICAID

## 2022-04-04 VITALS
SYSTOLIC BLOOD PRESSURE: 136 MMHG | DIASTOLIC BLOOD PRESSURE: 92 MMHG | TEMPERATURE: 97 F | HEART RATE: 100 BPM | RESPIRATION RATE: 18 BRPM

## 2022-04-04 PROCEDURE — 74011250636 HC RX REV CODE- 250/636: Performed by: NURSE PRACTITIONER

## 2022-04-04 PROCEDURE — 90471 IMMUNIZATION ADMIN: CPT

## 2022-04-04 PROCEDURE — 90675 RABIES VACCINE IM: CPT | Performed by: NURSE PRACTITIONER

## 2022-04-04 PROCEDURE — 96372 THER/PROPH/DIAG INJ SC/IM: CPT

## 2022-04-04 RX ADMIN — RABIES VACCINE 2.5 UNITS: KIT at 18:14

## 2022-04-04 NOTE — PROGRESS NOTES
[facility] - Chemotherapy Progress Note    6054 Pt admit to [facility] for Rabies vaccine/Day 3 ambulatory in stable condition. Assessment completed. No new concerns voiced. Patient denies SOB, fever, cough, general not feeling well. Patient denies recent exposure to someone who has tested positive for COVID-19. Patient  denies having contact with anyone who has a pending COVID test.     Visit Vitals  BP (!) 136/92   Pulse 100   Temp 97 °F (36.1 °C)   Resp 18   LMP 03/20/2022       Medications Administered     rabies vaccine, PCEC (RABAVERT) kit 2.5 Units     Admin Date  04/04/2022 Action  Given Dose  2.5 Units Route  IntraMUSCular Administered By  Sally Ling RN              (IM R arm)    9844 Pt tolerated treatment well. D/c home ambulatory in no distress. Pt aware of next [facility] appointment scheduled for 04/08/2022.

## 2022-04-06 NOTE — PROGRESS NOTES
Late Entry:  CM fax form to [facility] for Rabies shot follow-up.     [address] [facility], [address] [phone]

## 2022-06-20 DIAGNOSIS — F41.9 ANXIETY AND DEPRESSION: ICD-10-CM

## 2022-06-20 DIAGNOSIS — G43.111 INTRACTABLE MIGRAINE WITH AURA WITH STATUS MIGRAINOSUS: ICD-10-CM

## 2022-06-20 DIAGNOSIS — F32.A ANXIETY AND DEPRESSION: ICD-10-CM

## 2022-06-21 RX ORDER — UBROGEPANT 50 MG/1
TABLET ORAL
Qty: 10 TABLET | Refills: 1 | Status: SHIPPED | OUTPATIENT
Start: 2022-06-21

## 2022-08-29 RX ORDER — FLUTICASONE PROPIONATE 50 MCG
SPRAY, SUSPENSION (ML) NASAL
Qty: 16 EACH | Refills: 5 | Status: SHIPPED | OUTPATIENT
Start: 2022-08-29

## 2022-09-21 ENCOUNTER — TELEPHONE (OUTPATIENT)
Dept: FAMILY MEDICINE CLINIC | Age: 37
End: 2022-09-21

## 2022-09-21 NOTE — TELEPHONE ENCOUNTER
----- Message from Jose Guadalupe Jovani sent at 9/20/2022  4:58 PM EDT -----  Subject: Message to Provider    QUESTIONS  Information for Provider? Patient wants to reschedule her appointment for   cpe, needs ppd  ---------------------------------------------------------------------------  --------------  [address] [employer]  [phone]; OK to leave message on voicemail  ---------------------------------------------------------------------------  --------------  SCRIPT ANSWERS  Relationship to Patient?  Self

## 2022-09-21 NOTE — TELEPHONE ENCOUNTER
----- Message from Allegra Pacheco sent at 9/21/2022  9:25 AM EDT -----  Subject: Message to Provider    QUESTIONS  Information for Provider? Patient called to return the call from [facility],   but [facility] was not available. Please return patient call. Thank you   ---------------------------------------------------------------------------  --------------  Nicky LITTLE  [phone]; OK to leave message on voicemail  ---------------------------------------------------------------------------  --------------  SCRIPT ANSWERS  Relationship to Patient?  Self

## 2022-09-30 ENCOUNTER — OFFICE VISIT (OUTPATIENT)
Dept: FAMILY MEDICINE CLINIC | Age: 37
End: 2022-09-30
Payer: MEDICAID

## 2022-09-30 VITALS
OXYGEN SATURATION: 99 % | HEART RATE: 78 BPM | RESPIRATION RATE: 12 BRPM | SYSTOLIC BLOOD PRESSURE: 139 MMHG | BODY MASS INDEX: 34.52 KG/M2 | DIASTOLIC BLOOD PRESSURE: 102 MMHG | WEIGHT: 187.6 LBS | HEIGHT: 62 IN | TEMPERATURE: 98.4 F
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
## 2022-09-30 DIAGNOSIS — M25.562 ACUTE PAIN OF BOTH KNEES: Primary | ICD-10-CM

## 2022-09-30 DIAGNOSIS — M25.561 ACUTE PAIN OF BOTH KNEES: Primary | ICD-10-CM

## 2022-09-30 DIAGNOSIS — M54.40 ACUTE LEFT-SIDED LOW BACK PAIN WITH SCIATICA, SCIATICA LATERALITY UNSPECIFIED: ICD-10-CM

## 2022-09-30 DIAGNOSIS — Z11.1 SCREENING-PULMONARY TB: ICD-10-CM

## 2022-09-30 PROCEDURE — 99214 OFFICE O/P EST MOD 30 MIN: CPT | Performed by: FAMILY MEDICINE

## 2022-09-30 RX ORDER — DICLOFENAC SODIUM 50 MG/1
50 TABLET, DELAYED RELEASE ORAL
Qty: 30 TABLET | Refills: 0 | Status: SHIPPED | OUTPATIENT
Start: 2022-09-30

## 2022-09-30 RX ORDER — METHYLPREDNISOLONE 4 MG/1
TABLET ORAL
Qty: 1 DOSE PACK | Refills: 0 | Status: SHIPPED | OUTPATIENT
Start: 2022-09-30
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
## 2022-10-06 LAB
GAMMA INTERFERON BACKGROUND BLD IA-ACNC: 0.4 IU/ML
M TB IFN-G BLD-IMP: NEGATIVE
M TB IFN-G CD4+ BCKGRND COR BLD-ACNC: 0.1 IU/ML
MITOGEN IGNF BLD-ACNC: >10 IU/ML
QUANTIFERON TB2 AG: 0.09 IU/ML
SERVICE CMNT-IMP: NORMAL

## 2023-01-12 ENCOUNTER — NURSE TRIAGE (OUTPATIENT)
Dept: OTHER | Facility: CLINIC | Age: 38
End: 2023-01-12

## 2023-01-12 NOTE — TELEPHONE ENCOUNTER
Location of patient:  [facility] Dr alvarado from [place] at [facility] with [employer]. Subjective: Caller states \"high blood pressure 158/111\"     Current Symptoms: see above, she went to obn on 12/15, and today as well and would not birth control she wants because her bp is too high, denies symptoms. Does take medication for bp, takes as prescribed    Onset: almost 1 month ago;     Associated Symptoms: NA    Pain Severity: 0/10; Temperature: n/a     What has been tried: nothing    LMP:  had an  on 12/15, last period before then  Pregnant: No    Recommended disposition: See in Office Today    Care advice provided, patient verbalizes understanding; denies any other questions or concerns; instructed to call back for any new or worsening symptoms. Patient/Caller agrees with recommended disposition; writer provided warm transfer to [place] [facility] for appointment scheduling    Attention Provider: Thank you for allowing me to participate in the care of your patient. The patient was connected to triage in response to information provided to the [facility]. Please do not respond through this encounter as the response is not directed to a shared pool.     Reason for Disposition   Systolic BP >= 963 OR Diastolic >= 406    Protocols used: Blood Pressure - High-ADULT-OH

## 2023-01-13 ENCOUNTER — OFFICE VISIT (OUTPATIENT)
Dept: URGENT CARE | Age: 38
End: 2023-01-13
Payer: MEDICAID

## 2023-01-13 ENCOUNTER — VIRTUAL VISIT (OUTPATIENT)
Dept: FAMILY MEDICINE CLINIC | Age: 38
End: 2023-01-13

## 2023-01-13 ENCOUNTER — TELEPHONE (OUTPATIENT)
Dept: FAMILY MEDICINE CLINIC | Age: 38
End: 2023-01-13

## 2023-01-13 VITALS
WEIGHT: 180 LBS | DIASTOLIC BLOOD PRESSURE: 99 MMHG | SYSTOLIC BLOOD PRESSURE: 164 MMHG | HEIGHT: 62 IN | BODY MASS INDEX: 33.13 KG/M2 | RESPIRATION RATE: 16 BRPM | OXYGEN SATURATION: 100 % | TEMPERATURE: 97.7 F | HEART RATE: 82 BPM

## 2023-01-13 DIAGNOSIS — O13.3 GESTATIONAL HTN, THIRD TRIMESTER: ICD-10-CM

## 2023-01-13 DIAGNOSIS — I10 ESSENTIAL HYPERTENSION: ICD-10-CM

## 2023-01-13 DIAGNOSIS — I10 HYPERTENSION, UNSPECIFIED TYPE: Primary | ICD-10-CM

## 2023-01-13 DIAGNOSIS — F32.A ANXIETY AND DEPRESSION: ICD-10-CM

## 2023-01-13 DIAGNOSIS — Z20.822 SUSPECTED COVID-19 VIRUS INFECTION: Primary | ICD-10-CM

## 2023-01-13 DIAGNOSIS — F41.9 ANXIETY AND DEPRESSION: ICD-10-CM

## 2023-01-13 RX ORDER — LAMOTRIGINE 200 MG/1
TABLET ORAL
COMMUNITY
Start: 2022-12-31

## 2023-01-13 RX ORDER — QUETIAPINE FUMARATE 100 MG/1
TABLET, FILM COATED ORAL
COMMUNITY
Start: 2022-12-29

## 2023-01-13 RX ORDER — AMLODIPINE BESYLATE 10 MG/1
10 TABLET ORAL DAILY
Qty: 30 TABLET | Refills: 1 | Status: SHIPPED | OUTPATIENT
Start: 2023-01-13

## 2023-01-13 RX ORDER — AZITHROMYCIN 250 MG/1
TABLET, FILM COATED ORAL
Qty: 6 TABLET | Refills: 0 | Status: SHIPPED | OUTPATIENT
Start: 2023-01-13

## 2023-01-13 RX ORDER — LABETALOL 100 MG/1
TABLET, FILM COATED ORAL
Qty: 30 TABLET | Refills: 2
Start: 2023-01-13

## 2023-01-13 RX ORDER — METHYLPREDNISOLONE 4 MG/1
TABLET ORAL
Qty: 1 DOSE PACK | Refills: 0 | Status: SHIPPED | OUTPATIENT
Start: 2023-01-13

## 2023-01-13 RX ORDER — CETIRIZINE HYDROCHLORIDE 10 MG/1
10 TABLET ORAL
Qty: 30 TABLET | Refills: 4 | Status: SHIPPED | OUTPATIENT
Start: 2023-01-13

## 2023-01-13 RX ORDER — LABETALOL 300 MG/1
300 TABLET, FILM COATED ORAL 3 TIMES DAILY
Qty: 90 TABLET | Refills: 2 | Status: SHIPPED | OUTPATIENT
Start: 2023-01-13

## 2023-01-13 RX ORDER — GUAIFENESIN 100 MG/5ML
200 SOLUTION ORAL
Qty: 118 ML | Refills: 0 | Status: SHIPPED | OUTPATIENT
Start: 2023-01-13

## 2023-01-13 NOTE — PROGRESS NOTES
HISTORY OF PRESENT ILLNESS  Amy Hinojosa is a 40 y.o. female. present for Bp check , compliant w/ the bp meds, a low salt diet, an  active life style, patient does obtain the bp at home and the average of  >140/90, today the pt denies Chest Pain, has no legs swelling no lightheadedness, in addition patient also denies any racing heart palpitation at this visit,      Today's COVID-19 unvaccinated pt main concerns were provided on virtual visit and Video telemed format,  Present on VV for the concern of the current medical conditions,  pt is w/ comorbid history and  the pt does not know if has been exposed to covid-19 individual, and has not been tested yet, currently not working  patient currently have hoarseness having cough mostly dry, does not have shortness of breath and patient is not feeling lightheadedness and Dz for last couple of days,  pt has no low grade fever,  nl smell nl taste, patient also not complaining of some nausea feeling , no vomiting diarrhea, no body ache , and no orbital pain, no rash, patient also states of having a good family support at this time    the pat has not been feeling anxious, and  Has not been feeling stressed out, otherwise feeling better since the last visit    Patient Active Problem List    Diagnosis Date Noted    B[facility] 2 disorder, [facility] depressive episode [facility] 03/02/2021    Dyslipidemia 09/10/2014    Asthma 09/10/2014    HTN (hypertension) 01/24/2014    Anxiety and depression 01/24/2014     Current Outpatient Medications   Medication Sig Dispense Refill    amLODIPine (NORVASC) 10 mg tablet Take 1 Tablet by mouth daily. 30 Tablet 1    labetaloL (NORMODYNE) 300 mg tablet Take 1 Tablet by mouth three (3) times daily. 90 Tablet 2    labetaloL (NORMODYNE) 100 mg tablet Please take half a tablet three times daily in addition to the 300 mg tid, 30 Tablet 2    cetirizine (ZYRTEC) 10 mg tablet Take 1 Tablet by mouth daily as needed for Allergies.  30 Tablet 4 methylPREDNISolone (MEDROL DOSEPACK) 4 mg tablet As directed for 6 days one package 1 Dose Pack 0    azithromycin (ZITHROMAX) 250 mg tablet 2 first day then one tab daily till finished 6 Tablet 0    guaiFENesin (Tussin) 100 mg/5 mL liquid Take 10 mL by mouth three (3) times daily as needed for Cough. 118 mL 0    diclofenac EC (VOLTAREN) 50 mg EC tablet Take 1 Tablet by mouth two (2) times daily as needed for Pain. 30 Tablet 0    fluticasone propionate (FLONASE) 50 mcg/actuation nasal spray ONE PUMP TO EACH NOSTRIL DAILY 16 Each 5    Ubrelvy 50 mg tablet TAKE 1 TABLET BY MOUTH AT ONCE AS NEEDED FOR MIRGRAINE. MAX DAILY: 4 TABLETS 10 Tablet 1    norethindrone-ethinyl estradiol (MICROGESTIN 1/20) 1-20 mg-mcg tablet Take 1 Tablet by mouth daily. clonazePAM (KlonoPIN) 1 mg tablet Take 1 Tablet by mouth two (2) times daily as needed for Anxiety or Sleep. Max Daily Amount: 2 mg. 60 Tablet 2    DULoxetine (CYMBALTA) 30 mg capsule Take 1 Capsule by mouth daily. 30 Capsule 3    lamoTRIgine (LaMICtal) 100 mg tablet Take 1 Tablet by mouth two (2) times a day. 60 Tablet 3    nortriptyline (PAMELOR) 25 mg capsule Take 1 Cap by mouth nightly. 30 Cap 2    loratadine (CLARITIN) 10 mg tablet TAKE 1 TABLET BY MOUTH DAILY AS NEEDED FOR ALLERGIES. 30 Tab 1    albuterol (PROVENTIL HFA, VENTOLIN HFA, PROAIR HFA) 90 mcg/actuation inhaler Take 1 Puff by inhalation every four (4) hours as needed for Wheezing. 1 Inhaler 3    QUEtiapine (SEROquel) 100 mg tablet TAKE 1 TABLET BY MOUTH EVERY DAY AT BEDTIME AS NEEDED      lamoTRIgine (LaMICtal) 200 mg tablet TAKE 1 TABLET BY MOUTH EVERY DAY AS DIRECTED      potassium chloride (K-DUR, KLOR-CON) 20 mEq tablet Take 1 Tab by mouth daily. (Patient not taking: No sig reported) 15 Tab 0    diclofenac (VOLTAREN) 1 % gel Apply 4 g to affected area four (4) times daily.  (Patient not taking: No sig reported) 100 g 3     Allergies   Allergen Reactions    Codeine Other (comments)     Hallucinations, raise blood pressure     Hydrocodone Hives    Lortab [Hydrocodone-Acetaminophen] Hives and Itching    Tramadol Nausea Only     Headache     Past Medical History:   Diagnosis Date    Anxiety disorder     Arthritis     Asthma     Bipolar 1 disorder (HCC)     Depression     Frequent headaches     Frequent headaches     Hypertension     Migraine     Muscle pain     Other ill-defined conditions(799.89)     dysplagia    Psychiatric disorder     bipolar, anxiety, depression, PTSD    PTSD (post-traumatic stress disorder)      Past Surgical History:   Procedure Laterality Date    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    HX GYN  3-    birth    HX OTHER SURGICAL      Dysplasia     Family History   Problem Relation Age of Onset    Cancer Mother     Diabetes Father     Other Father         Aneursym    Migraines Sister      Social History     Tobacco Use    Smoking status: Every Day     Packs/day: 0.50     Years: 14.00     Pack years: 7.00     Types: Cigarettes     Start date: 2006    Smokeless tobacco: Never   Substance Use Topics    Alcohol use: Yes     Alcohol/week: 0.0 standard drinks     Comment: social      Lab Results   Component Value Date/Time    Hemoglobin A1c 5.6 06/09/2021 10:46 AM    Hemoglobin A1c 5.3 09/10/2014 12:22 PM    Glucose 106 (H) 06/09/2021 10:46 AM    LDL, calculated 130.8 (H) 06/09/2021 10:46 AM    Creatinine 0.84 06/09/2021 10:46 AM      Lab Results   Component Value Date/Time    Cholesterol, total 181 06/09/2021 10:46 AM    HDL Cholesterol 29 06/09/2021 10:46 AM    LDL, calculated 130.8 (H) 06/09/2021 10:46 AM    Triglyceride 106 06/09/2021 10:46 AM    CHOL/HDL Ratio 6.2 (H) 06/09/2021 10:46 AM        Review of Systems   Constitutional:  Positive for chills and malaise/fatigue. Negative for fever. HENT:  Positive for congestion, ear pain, sinus pain and sore throat. Negative for nosebleeds. Eyes:  Positive for redness. Negative for blurred vision, pain and discharge. Respiratory:  Positive for cough and sputum production. Negative for shortness of breath. Cardiovascular:  Negative for chest pain and leg swelling. Gastrointestinal:  Negative for constipation, diarrhea, nausea and vomiting. Genitourinary:  Negative for frequency. Musculoskeletal:  Negative for joint pain. Skin:  Negative for itching and rash. Neurological:  Positive for headaches. Psychiatric/Behavioral:  Negative for depression. The patient is not nervous/anxious. Physical Exam  Constitutional:       Appearance: She is obese. She is not ill-appearing or toxic-appearing. HENT:      Head: Normocephalic and atraumatic. Mouth/Throat:      Mouth: Mucous membranes are moist.   Neurological:      Mental Status: She is alert and oriented to person, place, and time. Psychiatric:         Mood and Affect: Mood normal.         Behavior: Behavior normal.         Thought Content: Thought content normal.         Judgment: Judgment normal.       ASSESSMENT and PLAN    ICD-10-CM ICD-9-CM    1. Suspected COVID-19 virus infection  Z20.822 V01.79 cetirizine (ZYRTEC) 10 mg tablet      methylPREDNISolone (MEDROL DOSEPACK) 4 mg tablet      azithromycin (ZITHROMAX) 250 mg tablet      guaiFENesin (Tussin) 100 mg/5 mL liquid      2. Essential hypertension  I10 401.9 labetaloL (NORMODYNE) 300 mg tablet      labetaloL (NORMODYNE) 100 mg tablet      3. Anxiety and depression  F41.9 300.00 labetaloL (NORMODYNE) 300 mg tablet    F32. A 311 labetaloL (NORMODYNE) 100 mg tablet      4.  Gestational HTN, third trimester  O13.3 642.33 labetaloL (NORMODYNE) 300 mg tablet      labetaloL (NORMODYNE) 100 mg tablet          reviewed diet, exercise and weight control

## 2023-01-13 NOTE — PROGRESS NOTES
Chief Complaint   Patient presents with    ED Follow-up     Seen today at [facility] urgent care for c/o elevated blood pressure,  started on amlodipine and advised to follow up with pcp       1. Have you been to the ER, urgent care clinic since your last visit? Hospitalized since your last visit? Yes When: 1/13/23  Where: [facility]  Reason for visit: elevated blood pressure    2. Have you seen or consulted any other health care providers outside of the [address] [place] [address] since your last visit? Include any pap smears or colon screening. No    Call placed to pt. Verified patient with two type of identifiers.

## 2023-01-13 NOTE — PROGRESS NOTES
Here for elevated blood pressure  Established hx of HTN  Is on betablocker. Has been on amlodipine in past but not on it currently  BPs have been consistently jeny at home  Denies any SOB, chest pain, dizziness  Has a PCP who managed HTN- phone scheduling referred her here to urgent care instead of scheduling with PCP. 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    HX GYN  3-    birth    HX OTHER SURGICAL      Dysplasia         Family History   Problem Relation Age of Onset    Cancer Mother     Diabetes Father     Other Father         Aneursym    Migraines Sister         Social History     Socioeconomic History    Marital status: SINGLE     Spouse name: Not on file    Number of children: Not on file    Years of education: Not on file    Highest education level: Not on file   Occupational History    Not on file   Tobacco Use    Smoking status: Every Day     Packs/day: 0.50     Years: 14.00     Pack years: 7.00     Types: Cigarettes     Start date: 2006    Smokeless tobacco: Never   Vaping Use    Vaping Use: Never used   Substance and Sexual Activity    Alcohol use:  Yes     Alcohol/week: 0.0 standard drinks     Comment: social    Drug use: Yes     Types: Marijuana     Comment: occassional    Sexual activity: Yes     Partners: Male   Other Topics Concern    Not on file   Social History Narrative    Not on file     Social Determinants of Health     Financial Resource Strain: Not on file   Food Insecurity: Not on file   Transportation Needs: Not on file Physical Activity: Not on file   Stress: Not on file   Social Connections: Not on file   Intimate Partner Violence: Not on file   Housing Stability: Not on file                ALLERGIES: Codeine, Hydrocodone, Lortab [hydrocodone-acetaminophen], and Tramadol    Review of Systems   All other systems reviewed and are negative. Vitals:    01/13/23 0947   BP: (!) 164/99   Pulse: 82   Resp: 16   Temp: 97.7 °F (36.5 °C)   SpO2: 100%   Weight: 180 lb (81.6 kg)   Height: 5' 2\" (1.575 m)       Physical Exam  Vitals reviewed. Constitutional:       General: She is not in acute distress. Appearance: Normal appearance. She is not ill-appearing or diaphoretic. HENT:      Mouth/Throat:      Mouth: Mucous membranes are moist.      Pharynx: No oropharyngeal exudate or posterior oropharyngeal erythema. Eyes:      Extraocular Movements: Extraocular movements intact. Cardiovascular:      Rate and Rhythm: Normal rate and regular rhythm. Pulses: Normal pulses. Heart sounds: Normal heart sounds. Pulmonary:      Effort: Pulmonary effort is normal.      Breath sounds: Normal breath sounds. Musculoskeletal:      Right lower leg: No edema. Left lower leg: No edema. Skin:     Capillary Refill: Capillary refill takes less than 2 seconds. Findings: No rash. Neurological:      Mental Status: She is alert and oriented to person, place, and time. Psychiatric:         Mood and Affect: Mood normal.         Behavior: Behavior normal.         Thought Content: Thought content normal.       MDM     Differential Diagnosis; Clinical Impression; Plan:       CLINICAL IMPRESSION:  (I10) Hypertension, unspecified type  (primary encounter diagnosis)    Orders Placed This Encounter      amLODIPine (NORVASC) 10 mg tablet          Sig: Take 1 Tablet by mouth daily.           Dispense:  30 Tablet          Refill:  1      Plan:  HTN- asymptomatic  BP in office 164/99  Re start amlodipine- monitor BPs  Follow up with PCP for further management within 1 month          Procedures

## 2023-01-13 NOTE — TELEPHONE ENCOUNTER
Pt would like med refills       labetaloL (NORMODYNE) 100 mg tablet & 300 mg tablet     Also, for the steroid - it got lost when she moved     [facility]    Best number tor each her is [phone]

## 2023-01-17 ENCOUNTER — TELEPHONE (OUTPATIENT)
Dept: FAMILY MEDICINE CLINIC | Age: 38
End: 2023-01-17

## 2023-01-17 NOTE — TELEPHONE ENCOUNTER
----- Message from Janet Dianecielo sent at 1/17/2023 12:28 PM EST -----  Subject: Medication Problem    Medication: labetaloL (NORMODYNE) 100 mg tablet  Dosage: 1/2 tab 3 times a day  Ordering Provider: estefani    Question/Problem: patient states the pharmacy did not get the refill      Pharmacy: [id number]    ---------------------------------------------------------------------------  --------------  Janel Freedman [facility]  [phone]; OK to leave message on voicemail  ---------------------------------------------------------------------------  --------------    SCRIPT ANSWERS  Relationship to Patient: Self

## 2023-01-30 ENCOUNTER — OFFICE VISIT (OUTPATIENT)
Dept: FAMILY MEDICINE CLINIC | Age: 38
End: 2023-01-30
Payer: MEDICAID

## 2023-01-30 VITALS
TEMPERATURE: 98.2 F | HEIGHT: 62 IN | SYSTOLIC BLOOD PRESSURE: 159 MMHG | RESPIRATION RATE: 21 BRPM | DIASTOLIC BLOOD PRESSURE: 113 MMHG | OXYGEN SATURATION: 100 % | BODY MASS INDEX: 32.92 KG/M2 | HEART RATE: 78 BPM

## 2023-01-30 DIAGNOSIS — G89.29 CHRONIC UPPER BACK PAIN: ICD-10-CM

## 2023-01-30 DIAGNOSIS — I10 HYPERTENSION, UNSPECIFIED TYPE: ICD-10-CM

## 2023-01-30 DIAGNOSIS — M54.9 CHRONIC UPPER BACK PAIN: ICD-10-CM

## 2023-01-30 DIAGNOSIS — M54.2 NECK PAIN: Primary | ICD-10-CM

## 2023-01-30 DIAGNOSIS — I10 ESSENTIAL HYPERTENSION: ICD-10-CM

## 2023-01-30 PROCEDURE — 99214 OFFICE O/P EST MOD 30 MIN: CPT | Performed by: FAMILY MEDICINE

## 2023-01-30 PROCEDURE — 3080F DIAST BP >= 90 MM HG: CPT | Performed by: FAMILY MEDICINE

## 2023-01-30 PROCEDURE — 3077F SYST BP >= 140 MM HG: CPT | Performed by: FAMILY MEDICINE

## 2023-01-30 RX ORDER — AMLODIPINE BESYLATE 10 MG/1
10 TABLET ORAL DAILY
Qty: 90 TABLET | Refills: 1 | Status: SHIPPED | OUTPATIENT
Start: 2023-01-30

## 2023-01-30 NOTE — PROGRESS NOTES
Loretta Marquez (: 1985) is a 40 y.o. female, established patient, here for evaluation of the following chief complaint(s):  Blood Pressure Check     neck upper back pain due to enlarged breast size 38 DD  pt has been suffering from long term pain/discomfort. Her pain has not been relieved with the use of anti-inflammatory medications and muscle relaxers. In addition she has not improved with physical therapy/and/or chiropractic treatment,  her current neck and upper back could be related to her large breast size. present for Bp check , compliant w/ the bp meds, recently her labetalol was increased from 300 mg 3 times daily to 350 mg 3 times daily she has been on it since pregnancy in  which got complicated with preeclampsia currently this is the only blood pressure medication that she has been taking a low salt diet, an  active life style, patient does obtain the bp at home and the average of  >140/90, today the pt denies Chest Pain, has no legs swelling no lightheadedness, in addition patient also denies any racing heart palpitation at this visit,   She also has been seen psychiatrist has been stable on current medication  the pat has not been feeling anxious, and  Has not been feeling stressed out, otherwise feeling better since the last visit      Constitutional: no chills and fever,  , nad     HENT: no ear pain or nosebleeds. No blurred vision    Respiratory: no shortness of breath, wheezing cough     Cardiovascular: Has no chest pain, ,and racing heart . Gastrointestinal: No constipation, diarrhea, nausea and vomiting. Genitourinary: No frequency. Musculoskeletal: Negative for joint pain. Skin: no itching, no rash. Neurological: Negative for dizziness, no tremors  Psychiatric/Behavioral: Negative for depression   is not nervous/anxious. and not depressed the patient is not nervous/anxious.         General:  alert cooperative appears stated for the age  [de-identified]; normocephalic and atraumatic PERRLA extraocular motor intact normal tympanic membrane normal external ear bilaterally, mucosal normal no drainage  Neck: supple no adenopathy no JVD no bruit  Lungs: Clear to auscultation bilaterally no rales rhonchi or wheezes  Heart: Normal regular S1-S2 ,  no murmur  Breast exam deferred  Abdomen: Soft nontender normal bowel sounds   Extremities: Normal range of motion no point tenderness normal pulses no edema  Pelvic/: No lesion, no lymphadenopathy  Skin: Normal no lesion no rash       ASSESSMENT/PLAN:  Below is the assessment and plan developed based on review of pertinent history, physical exam, labs, studies, and medications. 1. Neck pain  -     REFERRAL TO PHYSICAL THERAPY  -     REFERRAL TO ORTHOPEDICS  -     REFERRAL TO PLASTIC SURGERY  2. Chronic upper back pain  -     REFERRAL TO PHYSICAL THERAPY  -     REFERRAL TO ORTHOPEDICS  -     REFERRAL TO PLASTIC SURGERY  3. Essential hypertension  -     REFERRAL TO PHYSICAL THERAPY  -     REFERRAL TO ORTHOPEDICS  -     REFERRAL TO PLASTIC SURGERY  4. Hypertension, unspecified type  -     REFERRAL TO PHYSICAL THERAPY  -     REFERRAL TO ORTHOPEDICS  -     REFERRAL TO PLASTIC SURGERY  -     amLODIPine (NORVASC) 10 mg tablet; Take 1 Tablet by mouth daily. , Normal, Disp-90 Tablet, R-1    No follow-ups on file. She would greatly benefit  from a breast reduction.      Blood pressure unfortunately uncontrolled could be secondary to chronic pain or the current medication we will discontinue 350 mg of labetalol 3 times daily start labetalol 300 mg 3 times daily include amlodipine 10 mg once daily patient was told to monitor blood pressure each time before giving herself an medication for blood pressure control patient was told if the blood pressure less than 140/90 she should be okay with 1 labetalol and 1 amlodipine in the morning otherwise if the blood pressure greater than 150/90 patient advised to continue with labetalol and lunchtime and labetalol at dinner patient was also told to return to clinic in 2 weeks for repeat blood pressure,      An electronic signature was used to authenticate this note.   -- Jami Mendoza MD

## 2023-01-30 NOTE — LETTER
NOTIFICATION RETURN TO WORK / SCHOOL    1/30/2023 4:24 PM    Ms. Paul Branch  [address] [place] [address]      To Whom It May Concern:    Gerardodevon Ruiz is currently under the care of [facility]. She will return to work/school on: 1/31/2023    If there are questions or concerns please have the patient contact our office.         Sincerely,      Jennifer Scott MD

## 2023-01-30 NOTE — PROGRESS NOTES
Identified pt with two pt identifiers(name and ). Reviewed record in preparation for visit and have obtained necessary documentation. Chief Complaint   Patient presents with    Blood Pressure Check          Vitals:    23 1602 23 1606   BP: (!) 156/98 (!) 159/113   Pulse: 78    Resp: 21    Temp: 98.2 °F (36.8 °C)    TempSrc: Oral    SpO2: 100%    Height: 5' 2\" (1.575 m)        There are no preventive care reminders to display for this patient. Coordination of Care Questionnaire:  :   1) Have you been to an emergency room, urgent care, or hospitalized since your last visit? If yes, where when, and reason for visit? no       2. Have seen or consulted any other health care provider since your last visit? If yes, where when, and reason for visit? NO      Patient is accompanied by self I have received verbal consent from [facility] Rachel ROMERO to discuss any/all medical information while they are present in the room.

## 2023-02-16 ENCOUNTER — OFFICE VISIT (OUTPATIENT)
Dept: FAMILY MEDICINE CLINIC | Age: 38
End: 2023-02-16
Payer: MEDICAID

## 2023-02-16 VITALS
TEMPERATURE: 98.1 F | HEIGHT: 62 IN | HEART RATE: 78 BPM | WEIGHT: 179 LBS | DIASTOLIC BLOOD PRESSURE: 72 MMHG | OXYGEN SATURATION: 100 % | BODY MASS INDEX: 32.94 KG/M2 | SYSTOLIC BLOOD PRESSURE: 127 MMHG | RESPIRATION RATE: 20 BRPM

## 2023-02-16 DIAGNOSIS — F41.9 ANXIETY AND DEPRESSION: ICD-10-CM

## 2023-02-16 DIAGNOSIS — O13.3 GESTATIONAL HTN, THIRD TRIMESTER: ICD-10-CM

## 2023-02-16 DIAGNOSIS — F32.A ANXIETY AND DEPRESSION: ICD-10-CM

## 2023-02-16 DIAGNOSIS — J45.909 ASTHMA: ICD-10-CM

## 2023-02-16 DIAGNOSIS — I10 ESSENTIAL HYPERTENSION: ICD-10-CM

## 2023-02-16 DIAGNOSIS — I10 HYPERTENSION, UNSPECIFIED TYPE: ICD-10-CM

## 2023-02-16 PROCEDURE — 3074F SYST BP LT 130 MM HG: CPT | Performed by: FAMILY MEDICINE

## 2023-02-16 PROCEDURE — 3078F DIAST BP <80 MM HG: CPT | Performed by: FAMILY MEDICINE

## 2023-02-16 PROCEDURE — 99213 OFFICE O/P EST LOW 20 MIN: CPT | Performed by: FAMILY MEDICINE

## 2023-02-16 RX ORDER — ALBUTEROL SULFATE 90 UG/1
1 AEROSOL, METERED RESPIRATORY (INHALATION)
Qty: 1 EACH | Refills: 3 | Status: SHIPPED | OUTPATIENT
Start: 2023-02-16

## 2023-02-16 RX ORDER — AMLODIPINE BESYLATE 10 MG/1
10 TABLET ORAL DAILY
Qty: 90 TABLET | Refills: 1
Start: 2023-02-16

## 2023-02-16 NOTE — PROGRESS NOTES
Identified pt with two pt identifiers(name and ). Reviewed record in preparation for visit and have obtained necessary documentation. Chief Complaint   Patient presents with    Follow-up     Blood pressure           Vitals:    23 1050 23 1053   BP: (!) 138/101 (!) 141/89   Pulse: 78    Resp: 20    Temp: 98.1 °F (36.7 °C)    TempSrc: Oral    SpO2: 100%    Weight: 179 lb (81.2 kg)    Height: 5' 2\" (1.575 m)    PainSc:   4    PainLoc: Arm        There are no preventive care reminders to display for this patient. Coordination of Care Questionnaire:  :   1) Have you been to an emergency room, urgent care, or hospitalized since your last visit? If yes, where when, and reason for visit? no       2. Have seen or consulted any other health care provider since your last visit? If yes, where when, and reason for visit? NO      Patient is accompanied by self I have received verbal consent from [facility] Rachel JASSO to discuss any/all medical information while they are present in the room.

## 2023-02-16 NOTE — PROGRESS NOTES
Giacomo Sandifer (: 1985) is a 40 y.o. female, established patient, here for evaluation of the following chief complaint(s):  Follow-up (Blood pressure )      present for Bp check , compliant w/ the bp meds, a low salt diet, an  active life style, patient does obtain the bp at home and the average of  <140/90, today the pt denies Chest Pain, has no legs swelling no lightheadedness,    patient also denies any racing heart palpitation at this visit,    the pat has not been feeling anxious, and  Has not been feeling stressed out, otherwise feeling better since the last visit      Constitutional: no chills and fever,  , nad     HENT: no ear pain or nosebleeds. No blurred vision  Respiratory: no shortness of breath, wheezing cough   Cardiovascular: Has no chest pain, ,and racing heart . Gastrointestinal: No constipation, diarrhea, nausea and vomiting. Genitourinary: No frequency. Musculoskeletal: Negative for joint pain. Skin: no itching, no rash. Neurological: Negative for dizziness, no tremors  Psychiatric/Behavioral: Negative for depression   is not nervous/anxious. and not depressed the patient is not nervous/anxious. General:  alert cooperative appears stated for the age  [de-identified]; normocephalic and atraumatic PERRLA extraocular motor intact normal tympanic membrane normal external ear bilaterally, mucosal normal no drainage  Neck: supple no adenopathy no JVD no bruit  Lungs: Clear to auscultation bilaterally no rales rhonchi or wheezes  Heart: Normal regular S1-S2 ,  no murmur  Breast exam deferred  Abdomen: Soft nontender normal bowel sounds   Extremities: Normal range of motion no point tenderness normal pulses no edema  Pelvic/: No lesion, no lymphadenopathy  Skin: Normal no lesion no rash        ASSESSMENT/PLAN:  Below is the assessment and plan developed based on review of pertinent history, physical exam, labs, studies, and medications.     1. Essential hypertension  -     amLODIPine (NORVASC) 10 mg tablet; Take 1 Tablet by mouth daily. , No Print, Disp-90 Tablet, R-1  -     albuterol (PROVENTIL HFA, VENTOLIN HFA, PROAIR HFA) 90 mcg/actuation inhaler; Take 1 Puff by inhalation every four (4) hours as needed for Wheezing., Normal, Disp-1 Each, R-3  2. Anxiety and depression  -     amLODIPine (NORVASC) 10 mg tablet; Take 1 Tablet by mouth daily. , No Print, Disp-90 Tablet, R-1  -     albuterol (PROVENTIL HFA, VENTOLIN HFA, PROAIR HFA) 90 mcg/actuation inhaler; Take 1 Puff by inhalation every four (4) hours as needed for Wheezing., Normal, Disp-1 Each, R-3  3. Gestational HTN, third trimester  -     amLODIPine (NORVASC) 10 mg tablet; Take 1 Tablet by mouth daily. , No Print, Disp-90 Tablet, R-1  -     albuterol (PROVENTIL HFA, VENTOLIN HFA, PROAIR HFA) 90 mcg/actuation inhaler; Take 1 Puff by inhalation every four (4) hours as needed for Wheezing., Normal, Disp-1 Each, R-3  4. Hypertension, unspecified type  -     amLODIPine (NORVASC) 10 mg tablet; Take 1 Tablet by mouth daily. , No Print, Disp-90 Tablet, R-1  -     albuterol (PROVENTIL HFA, VENTOLIN HFA, PROAIR HFA) 90 mcg/actuation inhaler; Take 1 Puff by inhalation every four (4) hours as needed for Wheezing., Normal, Disp-1 Each, R-3  5. Asthma  -     amLODIPine (NORVASC) 10 mg tablet; Take 1 Tablet by mouth daily. , No Print, Disp-90 Tablet, R-1  -     albuterol (PROVENTIL HFA, VENTOLIN HFA, PROAIR HFA) 90 mcg/actuation inhaler; Take 1 Puff by inhalation every four (4) hours as needed for Wheezing., Normal, Disp-1 Each, R-3    Continue with current medication follow-up with psychiatrist for further care call for any concern  Return in about 4 months (around 6/16/2023), or if symptoms worsen or fail to improve. An electronic signature was used to authenticate this note.   -- Chari Kolb MD

## 2023-05-15 ENCOUNTER — HOSPITAL ENCOUNTER (EMERGENCY)
Facility: HOSPITAL | Age: 38
Discharge: HOME | End: 2023-05-15
Attending: EMERGENCY MEDICINE
Payer: MEDICAID

## 2023-05-15 VITALS
OXYGEN SATURATION: 98 % | RESPIRATION RATE: 20 BRPM | WEIGHT: 181 LBS | BODY MASS INDEX: 33.31 KG/M2 | SYSTOLIC BLOOD PRESSURE: 134 MMHG | HEART RATE: 85 BPM | HEIGHT: 62 IN | DIASTOLIC BLOOD PRESSURE: 91 MMHG | TEMPERATURE: 98 F

## 2023-05-15 DIAGNOSIS — S39.012A STRAIN OF LUMBAR REGION, INITIAL ENCOUNTER: ICD-10-CM
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
## 2023-05-15 PROCEDURE — 6370000000 HC RX 637 (ALT 250 FOR IP): Performed by: EMERGENCY MEDICINE

## 2023-05-15 PROCEDURE — 99283 EMERGENCY DEPT VISIT LOW MDM: CPT | Performed by: EMERGENCY MEDICINE

## 2023-05-15 RX ORDER — LIDOCAINE 4 G/G
1 PATCH TOPICAL
Status: DISCONTINUED | OUTPATIENT
Start: 2023-05-15 | End: 2023-05-15 | Stop reason: HOSPADM

## 2023-05-15 RX ORDER — NAPROXEN 500 MG/1
500 TABLET ORAL 2 TIMES DAILY
Qty: 20 TABLET | Refills: 0 | Status: SHIPPED | OUTPATIENT
Start: 2023-05-15

## 2023-05-15 RX ORDER — NAPROXEN 250 MG/1
500 TABLET ORAL
Status: COMPLETED | OUTPATIENT
Start: 2023-05-15 | End: 2023-05-15

## 2023-05-15 RX ORDER — METHOCARBAMOL 750 MG/1
750 TABLET, FILM COATED ORAL 3 TIMES DAILY PRN
Qty: 30 TABLET | Refills: 0 | Status: SHIPPED | OUTPATIENT
Start: 2023-05-15 | End: 2023-05-25

## 2023-05-15 RX ADMIN — NAPROXEN 500 MG: 250 TABLET ORAL at 15:29

## 2023-05-15 ASSESSMENT — PAIN DESCRIPTION - LOCATION: LOCATION: GENERALIZED

## 2023-05-15 ASSESSMENT — PAIN DESCRIPTION - ORIENTATION: ORIENTATION: LEFT

## 2023-05-15 ASSESSMENT — PAIN - FUNCTIONAL ASSESSMENT: PAIN_FUNCTIONAL_ASSESSMENT: 0-10

## 2023-05-15 ASSESSMENT — PAIN SCALES - GENERAL: PAINLEVEL_OUTOF10: 8

## 2023-05-15 ASSESSMENT — PAIN DESCRIPTION - DESCRIPTORS: DESCRIPTORS: ACHING;SORE

## 2023-05-15 NOTE — ED PROVIDER NOTES
norethindrone-ethinyl estradiol (MICROGESTIN 1/20) 1-20 MG-MCG per tablet Take 1 tablet by mouth dailyHistorical Med      QUEtiapine (SEROQUEL) 100 MG tablet TAKE 1 TABLET BY MOUTH EVERY DAY AT BEDTIME AS NEEDEDHistorical Med      Ubrogepant (UBRELVY) 50 MG TABS TAKE 1 TABLET BY MOUTH AT ONCE AS NEEDED FOR MIRGRAINE. MAX DAILY: 4 TABLETSHistorical Med       !! - Potential duplicate medications found. Please discuss with provider. SCREENINGS               No data recorded         PHYSICAL EXAM      ED Triage Vitals [05/15/23 1451]   Enc Vitals Group      BP (!) 134/91      Pulse 85      Respirations 20      Temp 98 °F (36.7 °C)      Temp Source Oral      SpO2 98 %      Weight - Scale 181 lb (82.1 kg)      Height 5' 2\" (1.575 m)      Head Circumference       Peak Flow       Pain Score       Pain Loc       Pain Edu? Excl. in [address]? Physical Exam  Vitals and nursing note reviewed. Constitutional:       Comments: 41 YOF, resting in bed, no distress   HENT:      Head: Normocephalic and atraumatic. Nose: Nose normal.   Cardiovascular:      Rate and Rhythm: Normal rate and regular rhythm. Pulmonary:      Effort: Pulmonary effort is normal.      Breath sounds: Normal breath sounds. Abdominal:      General: Abdomen is flat. Tenderness: There is no abdominal tenderness. Musculoskeletal:         General: Tenderness present. Normal range of motion. Cervical back: No tenderness. Comments: L paraspinal tenderness with palpable spasm   Skin:     General: Skin is warm. Neurological:      General: No focal deficit present. Psychiatric:         Mood and Affect: Mood normal.        DIAGNOSTIC RESULTS   LABS:     No results found for this or any previous visit (from the past 24 hour(s)). EKG:  If performed, independent interpretation documented below in the MDM section     RADIOLOGY:  Non-plain film images such as CT, Ultrasound and MRI are read by the radiologist. Bouchra Alan
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
## 2023-05-15 NOTE — ED NOTES
Pt given discharge papers. E prescriptions for Robaxin and Naproxen  sent to patients pharmacy. Pt educated on discharge papers and prescriptions. Pt instructed to follow up with PCP as needed. Pt given work note. Pt verbalizes understanding and denies any further questions. Pt ambulated to waiting room with steady gait, alert and oriented x4.         Cyndie Mars RN  05/15/23 8480

## 2023-05-18 ENCOUNTER — NURSE TRIAGE (OUTPATIENT)
Dept: OTHER | Facility: CLINIC | Age: 38
End: 2023-05-18
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
## 2023-05-26 ENCOUNTER — TELEMEDICINE (OUTPATIENT)
Age: 38
End: 2023-05-26
Payer: MEDICAID

## 2023-05-26 DIAGNOSIS — M54.9 UPPER BACK PAIN: ICD-10-CM

## 2023-05-26 DIAGNOSIS — M54.50 ACUTE BILATERAL LOW BACK PAIN WITHOUT SCIATICA: ICD-10-CM
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
## 2023-05-26 DIAGNOSIS — S09.90XS HEAD TRAUMA, SEQUELA: Primary | ICD-10-CM

## 2023-05-26 DIAGNOSIS — M54.2 NECK PAIN, ACUTE: ICD-10-CM

## 2023-05-26 DIAGNOSIS — M25.512 ACUTE PAIN OF LEFT SHOULDER: ICD-10-CM

## 2023-05-26 PROCEDURE — 99214 OFFICE O/P EST MOD 30 MIN: CPT | Performed by: FAMILY MEDICINE

## 2023-05-26 RX ORDER — CYCLOBENZAPRINE HCL 10 MG
TABLET ORAL
COMMUNITY
Start: 2023-05-24

## 2023-05-26 RX ORDER — METHYLPREDNISOLONE 4 MG/1
TABLET ORAL
Qty: 21 TABLET | Refills: 0 | Status: SHIPPED | OUTPATIENT
Start: 2023-05-26

## 2023-05-26 RX ORDER — MELOXICAM 7.5 MG/1
7.5 TABLET ORAL DAILY PRN
Qty: 15 TABLET | Refills: 5 | Status: SHIPPED | OUTPATIENT
Start: 2023-05-26
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
## 2023-08-08 DIAGNOSIS — J45.909 UNSPECIFIED ASTHMA, UNCOMPLICATED: ICD-10-CM

## 2023-08-09 RX ORDER — ALBUTEROL SULFATE 90 UG/1
AEROSOL, METERED RESPIRATORY (INHALATION)
Qty: 1 EACH | Refills: 1 | Status: SHIPPED | OUTPATIENT
Start: 2023-08-09

## 2023-08-09 NOTE — TELEPHONE ENCOUNTER
Last appointment: 5/26/23  Next appointment: Jose Alberts to follow-up 7/26/23  Previous refill encounter(s): 2/16/23 #1 with 3 refills    Requested Prescriptions     Pending Prescriptions Disp Refills    albuterol sulfate HFA (PROVENTIL;VENTOLIN;PROAIR) 108 (90 Base) MCG/ACT inhaler [Pharmacy Med Name: ALBUTEROL HFA (PROAIR) INHALER] 1 each 1     Sig: INHALE 1 PUFF BY MOUTH EVERY 4 HOURS AS NEEDED FOR WHEEZE         For Pharmacy Admin Tracking Only    Program: Medication Refill  CPA in place:    Recommendation Provided To:    Intervention Detail: New Rx: 1, reason: Patient Preference  Intervention Accepted By:   Yandel Oh Closed?:    Time Spent (min): 5

## 2023-08-11 ENCOUNTER — TELEPHONE (OUTPATIENT)
Age: 38
End: 2023-08-11

## 2023-09-07 ENCOUNTER — HOSPITAL ENCOUNTER (EMERGENCY)
Facility: HOSPITAL | Age: 38
Discharge: HOME | End: 2023-09-07
Attending: EMERGENCY MEDICINE
Payer: MEDICAID

## 2023-09-07 ENCOUNTER — APPOINTMENT (OUTPATIENT)
Facility: HOSPITAL | Age: 38
End: 2023-09-07
Payer: MEDICAID

## 2023-09-07 VITALS
WEIGHT: 176 LBS | OXYGEN SATURATION: 98 % | SYSTOLIC BLOOD PRESSURE: 166 MMHG | TEMPERATURE: 98.6 F | RESPIRATION RATE: 18 BRPM | BODY MASS INDEX: 32.39 KG/M2 | DIASTOLIC BLOOD PRESSURE: 102 MMHG | HEART RATE: 91 BPM | HEIGHT: 62 IN

## 2023-09-07 DIAGNOSIS — S83.91XA SPRAIN OF RIGHT KNEE, UNSPECIFIED LIGAMENT, INITIAL ENCOUNTER: Primary | ICD-10-CM

## 2023-09-07 PROCEDURE — 6370000000 HC RX 637 (ALT 250 FOR IP): Performed by: EMERGENCY MEDICINE

## 2023-09-07 PROCEDURE — 73562 X-RAY EXAM OF KNEE 3: CPT

## 2023-09-07 PROCEDURE — 99283 EMERGENCY DEPT VISIT LOW MDM: CPT

## 2023-09-07 RX ORDER — IBUPROFEN 400 MG/1
800 TABLET ORAL
Status: COMPLETED | OUTPATIENT
Start: 2023-09-07 | End: 2023-09-07

## 2023-09-07 RX ORDER — IBUPROFEN 800 MG/1
800 TABLET ORAL EVERY 8 HOURS PRN
Qty: 30 TABLET | Refills: 1 | Status: SHIPPED | OUTPATIENT
Start: 2023-09-07 | End: 2023-09-27

## 2023-09-07 RX ADMIN — IBUPROFEN 800 MG: 400 TABLET, FILM COATED ORAL at 02:24

## 2023-09-07 ASSESSMENT — PAIN - FUNCTIONAL ASSESSMENT: PAIN_FUNCTIONAL_ASSESSMENT: 0-10

## 2023-09-07 ASSESSMENT — PAIN SCALES - GENERAL
PAINLEVEL_OUTOF10: 8
PAINLEVEL_OUTOF10: 8

## 2023-09-07 ASSESSMENT — PAIN DESCRIPTION - DESCRIPTORS
DESCRIPTORS: THROBBING
DESCRIPTORS: THROBBING

## 2023-09-07 ASSESSMENT — PAIN DESCRIPTION - ORIENTATION
ORIENTATION: RIGHT
ORIENTATION: RIGHT

## 2023-09-07 ASSESSMENT — PAIN DESCRIPTION - LOCATION
LOCATION: KNEE
LOCATION: KNEE

## 2023-09-07 ASSESSMENT — PAIN DESCRIPTION - PAIN TYPE: TYPE: ACUTE PAIN

## 2023-09-07 NOTE — ED PROVIDER NOTES
[facility] EMERGENCY DEPT  EMERGENCY DEPARTMENT ENCOUNTER       Pt Name: Ayana Vaughn  MRN: [id number]  [address] [facility] 1985  Date of evaluation: 9/7/2023  Provider: Andres Kang MD   PCP: Christine Tsang MD  Note Started: 5:36 AM 9/7/23     CHIEF COMPLAINT       Chief Complaint   Patient presents with    Knee Pain        HISTORY OF PRESENT ILLNESS: 1 or more elements      History From: patient, History limited by:  none     Ayana Vaughn is a 41 y.o. female who presents after twisting her knee. Patient states about 2 hours ago she was in the car and evidently twisted her leg the wrong way and felt a pop and had pain. 5 minutes later she was walking down stairs and she missed a step and twisted her knee again. She has been having pain ever since. Denies direct impact/blow to the knee. Did not fall. No other injuries     Nursing Notes were all reviewed and agreed with or any disagreements were addressed in the HPI. REVIEW OF SYSTEMS        Positives and Pertinent negatives as per HPI. PAST HISTORY     Past Medical History:  Past Medical History:   Diagnosis Date    Anxiety disorder     Arthritis     Asthma     Bipolar 1 disorder ([address])     Depression     Frequent headaches     Frequent headaches     Hypertension     Migraine     Muscle pain     Other ill-defined conditions(799.89)     dysplagia    Psychiatric disorder     bipolar, anxiety, depression, PTSD    PTSD (post-traumatic stress disorder)        Past Surgical History:  Past Surgical History:   Procedure Laterality Date    AMPUTATION Left     left hand middle finger.  8/2014    GYN  3-    birth    OTHER SURGICAL HISTORY      Dysplasia       Family History:  Family History   Problem Relation Age of Onset    Diabetes Father     Migraines Sister     Other Father         Aneursym    Cancer Mother        Social History:  Social History     Tobacco Use    Smoking status: Every Day     Packs/day: 0.50     Types: Cigarettes     Start date: 1/1/2006

## 2023-09-07 NOTE — ED NOTES
Discharge instructions were given to the patient by [facility]. The patient left the Emergency Department ambulatory, alert and oriented and in no acute distress with 1 prescriptions. The patient was encouraged to call or return to the ED for worsening issues or problems and was encouraged to schedule a follow up appointment for continuing care. The patient verbalized understanding of discharge instructions and prescriptions, all questions were answered. The patient has no further concerns at this time.         Paige Chavez RN  09/07/23 0407

## 2023-10-03 ENCOUNTER — NURSE TRIAGE (OUTPATIENT)
Dept: OTHER | Facility: CLINIC | Age: 38
End: 2023-10-03

## 2023-10-03 NOTE — TELEPHONE ENCOUNTER
[place]    [facility] with [employer]. Subjective: Caller states \"I was getting out my car and I felt a twist and a pop in my knee. I couldn't walk on it. \"     Current Symptoms: right knee pain. Edema noted. No redness. Onset: 9/3/2023  unchanged, no improvement. Associated Symptoms: intermittent numbness/tingling to foot. Pain Severity: 7/10; aching; constant    Temperature: denies     What has been tried: ER visit 9/7/72023. Ibuprofen    LMP:  2/3/2023-Birth control  Pregnant: No    Recommended disposition: See PCP within 3 Days    Care advice provided, patient verbalizes understanding; denies any other questions or concerns; instructed to call back for any new or worsening symptoms. Patient/Caller agrees with recommended disposition; writer provided warm transfer to Perla Valverde at [facility] for appointment scheduling    Attention Provider: Thank you for allowing me to participate in the care of your patient. The patient was connected to triage in response to information provided to the ECC/PSC. Please do not respond through this encounter as the response is not directed to a shared pool.       Reason for Disposition   MODERATE pain (e.g., symptoms interfere with work or school, limping) and present > 3 days    Protocols used: Knee Pain-ADULT-[place]

## 2023-10-04 NOTE — TELEPHONE ENCOUNTER
Last appointment: 23  Next appointment: Thor Sena to follow-up 23  Previous refill encounter(s): 22 #1 with 5 refills    Requested Prescriptions     Pending Prescriptions Disp Refills    fluticasone (FLONASE) 50 MCG/ACT nasal spray [Pharmacy Med Name: FLUTICASONE PROP 50 MCG SPRAY] 16 g 1     Si spray by Nasal route daily         For Pharmacy Admin Tracking Only    Program: Medication Refill  CPA in place:    Recommendation Provided To:    Intervention Detail: New Rx: 1, reason: Patient Preference  Intervention Accepted By:   Lis Ashley Closed?:    Time Spent (min): 5

## 2023-10-06 RX ORDER — FLUTICASONE PROPIONATE 50 MCG
1 SPRAY, SUSPENSION (ML) NASAL DAILY
Qty: 16 G | Refills: 1 | Status: SHIPPED | OUTPATIENT
Start: 2023-10-06

## 2023-10-17 ENCOUNTER — HOSPITAL ENCOUNTER (EMERGENCY)
Facility: HOSPITAL | Age: 38
Discharge: HOME | End: 2023-10-17
Payer: MEDICAID

## 2023-10-17 ENCOUNTER — APPOINTMENT (OUTPATIENT)
Facility: HOSPITAL | Age: 38
End: 2023-10-17
Payer: MEDICAID

## 2023-10-17 VITALS
OXYGEN SATURATION: 100 % | TEMPERATURE: 98.2 F | RESPIRATION RATE: 15 BRPM | DIASTOLIC BLOOD PRESSURE: 97 MMHG | BODY MASS INDEX: 33.13 KG/M2 | WEIGHT: 180 LBS | HEART RATE: 81 BPM | HEIGHT: 62 IN | SYSTOLIC BLOOD PRESSURE: 144 MMHG
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
## 2023-10-17 DIAGNOSIS — M25.562 ACUTE PAIN OF LEFT KNEE: ICD-10-CM

## 2023-10-17 DIAGNOSIS — M54.50 ACUTE BILATERAL LOW BACK PAIN WITHOUT SCIATICA: ICD-10-CM

## 2023-10-17 DIAGNOSIS — M25.552 LEFT HIP PAIN: ICD-10-CM

## 2023-10-17 PROCEDURE — 99283 EMERGENCY DEPT VISIT LOW MDM: CPT

## 2023-10-17 PROCEDURE — 6370000000 HC RX 637 (ALT 250 FOR IP): Performed by: PHYSICIAN ASSISTANT

## 2023-10-17 PROCEDURE — 73562 X-RAY EXAM OF KNEE 3: CPT

## 2023-10-17 PROCEDURE — 73502 X-RAY EXAM HIP UNI 2-3 VIEWS: CPT

## 2023-10-17 PROCEDURE — 72100 X-RAY EXAM L-S SPINE 2/3 VWS: CPT

## 2023-10-17 RX ORDER — NAPROXEN 500 MG/1
500 TABLET ORAL 2 TIMES DAILY WITH MEALS
Qty: 20 TABLET | Refills: 0 | Status: SHIPPED | OUTPATIENT
Start: 2023-10-17

## 2023-10-17 RX ORDER — METHOCARBAMOL 500 MG/1
500 TABLET, FILM COATED ORAL ONCE
Status: COMPLETED | OUTPATIENT
Start: 2023-10-17 | End: 2023-10-17

## 2023-10-17 RX ORDER — LIDOCAINE 4 G/G
1 PATCH TOPICAL
Status: DISCONTINUED | OUTPATIENT
Start: 2023-10-17 | End: 2023-10-17 | Stop reason: HOSPADM

## 2023-10-17 RX ORDER — METHOCARBAMOL 500 MG/1
750 TABLET, FILM COATED ORAL 3 TIMES DAILY
Qty: 15 TABLET | Refills: 0 | Status: SHIPPED | OUTPATIENT
Start: 2023-10-17 | End: 2023-10-27

## 2023-10-17 RX ORDER — LIDOCAINE 4 G/G
1 PATCH TOPICAL DAILY
Qty: 15 EACH | Refills: 0 | Status: SHIPPED | OUTPATIENT
Start: 2023-10-17

## 2023-10-17 RX ADMIN — METHOCARBAMOL 500 MG: 500 TABLET ORAL at 17:52

## 2023-10-17 ASSESSMENT — PAIN - FUNCTIONAL ASSESSMENT: PAIN_FUNCTIONAL_ASSESSMENT: 0-10

## 2023-10-17 ASSESSMENT — ENCOUNTER SYMPTOMS
VOMITING: 0
ABDOMINAL PAIN: 0
DIARRHEA: 0
SORE THROAT: 0
SHORTNESS OF BREATH: 0
BACK PAIN: 1

## 2023-10-17 ASSESSMENT — PAIN DESCRIPTION - LOCATION: LOCATION: GENERALIZED

## 2023-10-17 ASSESSMENT — PAIN DESCRIPTION - DESCRIPTORS: DESCRIPTORS: ACHING

## 2023-10-17 ASSESSMENT — LIFESTYLE VARIABLES
HOW OFTEN DO YOU HAVE A DRINK CONTAINING ALCOHOL: MONTHLY OR LESS
HOW MANY STANDARD DRINKS CONTAINING ALCOHOL DO YOU HAVE ON A TYPICAL DAY: 3 OR 4

## 2023-10-17 ASSESSMENT — PAIN DESCRIPTION - ORIENTATION: ORIENTATION: LEFT

## 2023-10-17 ASSESSMENT — PAIN SCALES - GENERAL: PAINLEVEL_OUTOF10: 8

## 2023-10-17 NOTE — ED TRIAGE NOTES
Pt reports falling off of a banister on Friday and landing on her L sided. Pt states she took ibuprofen 800mg for the pain with no relief. Pt reports LROM and pain in her L shoulder, arm, back and L knee.

## 2023-10-17 NOTE — ED NOTES
Patient (s) and family given copy of dc instructions and 3 script(s). Patient (s) and family verbalized understanding of instructions and script (s). Patient given a current medication reconciliation form and verbalized understanding of their medications. Patient (s)and family verbalized understanding of the importance of discussing medications with his or her physician or clinic they will be following up with. Patient alert and oriented and in no acute distress. Patient discharged home ambulatory with a work note.       Brenda Mc RN  10/17/23 8925

## 2023-10-17 NOTE — PROGRESS NOTES
Radiology waiting on a pregnancy test or wavier signed for L-spine x-ray. Patient is within age range for pregnancy.

## 2023-10-17 NOTE — ED NOTES
Called patient.  Sent alprazolam. Will reach out as needed.    Pt changed into gown and given a blanket to cover self prior to xray exam.     Garfield Atkinson RN  10/17/23 6579
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
## 2023-10-26 DIAGNOSIS — J45.909 UNSPECIFIED ASTHMA, UNCOMPLICATED: ICD-10-CM

## 2023-10-27 RX ORDER — ALBUTEROL SULFATE 90 UG/1
AEROSOL, METERED RESPIRATORY (INHALATION)
Qty: 8.5 EACH | Refills: 1 | Status: SHIPPED | OUTPATIENT
Start: 2023-10-27

## 2023-10-27 NOTE — TELEPHONE ENCOUNTER
Last appointment: 5/26/23  Next appointment: Errol Tom to follow-up 7/26/23  Previous refill encounter(s): 8/9/23 #1 with 1 refill    Requested Prescriptions     Pending Prescriptions Disp Refills    albuterol sulfate HFA (PROVENTIL;VENTOLIN;PROAIR) 108 (90 Base) MCG/ACT inhaler [Pharmacy Med Name: ALBUTEROL HFA (PROAIR) INHALER] 8.5 each 1     Sig: INHALE 1 PUFF BY MOUTH EVERY 4 HOURS AS NEEDED FOR WHEEZE         For Pharmacy Admin Tracking Only    Program: Medication Refill  CPA in place:    Recommendation Provided To:    Intervention Detail: New Rx: 1, reason: Patient Preference  Intervention Accepted By:   Yue Pinzon Closed?:    Time Spent (min): 5

## 2024-03-21 ENCOUNTER — TELEPHONE (OUTPATIENT)
Age: 39
End: 2024-03-21

## 2024-03-21 RX ORDER — AZITHROMYCIN 250 MG/1
TABLET, FILM COATED ORAL
Qty: 6 TABLET | Refills: 0 | Status: SHIPPED | OUTPATIENT
Start: 2024-03-21 | End: 2024-03-31

## 2024-03-21 NOTE — TELEPHONE ENCOUNTER
Patient was told by  [facility]  that her PCP needed to send a prescription to [id number] for Z-Pack. Patient's daughter was diagnosed with Whooping cough and was told by the Health Dept that her family in the household  needed to be treated. Patient can be reached at [phone].

## 2024-05-29 ENCOUNTER — HOSPITAL ENCOUNTER (EMERGENCY)
Facility: HOSPITAL | Age: 39
Discharge: HOME | End: 2024-05-29
Payer: MEDICAID

## 2024-05-29 VITALS
DIASTOLIC BLOOD PRESSURE: 91 MMHG | SYSTOLIC BLOOD PRESSURE: 134 MMHG | TEMPERATURE: 98 F | BODY MASS INDEX: 34.32 KG/M2 | OXYGEN SATURATION: 97 % | HEART RATE: 92 BPM | WEIGHT: 186.5 LBS | HEIGHT: 62 IN | RESPIRATION RATE: 18 BRPM

## 2024-05-29 DIAGNOSIS — K02.9 DENTAL CARIES: Primary | ICD-10-CM

## 2024-05-29 DIAGNOSIS — K08.89 DENTALGIA: ICD-10-CM

## 2024-05-29 PROCEDURE — 6370000000 HC RX 637 (ALT 250 FOR IP): Performed by: PHYSICIAN ASSISTANT

## 2024-05-29 PROCEDURE — 99283 EMERGENCY DEPT VISIT LOW MDM: CPT

## 2024-05-29 RX ORDER — AMOXICILLIN 875 MG/1
875 TABLET, COATED ORAL 2 TIMES DAILY
Qty: 14 TABLET | Refills: 0 | Status: SHIPPED | OUTPATIENT
Start: 2024-05-29 | End: 2024-06-05

## 2024-05-29 RX ORDER — KETOROLAC TROMETHAMINE 10 MG/1
10 TABLET, FILM COATED ORAL EVERY 6 HOURS PRN
Qty: 15 TABLET | Refills: 0 | Status: SHIPPED | OUTPATIENT
Start: 2024-05-29

## 2024-05-29 RX ADMIN — BENZOCAINE, BUTAMBEN, AND TETRACAINE HYDROCHLORIDE: .028; .004; .004 AEROSOL, SPRAY TOPICAL at 20:07

## 2024-05-29 ASSESSMENT — PAIN DESCRIPTION - DESCRIPTORS: DESCRIPTORS: DISCOMFORT

## 2024-05-29 ASSESSMENT — PAIN - FUNCTIONAL ASSESSMENT: PAIN_FUNCTIONAL_ASSESSMENT: 0-10

## 2024-05-29 ASSESSMENT — PAIN DESCRIPTION - ORIENTATION: ORIENTATION: RIGHT;LOWER

## 2024-05-29 ASSESSMENT — PAIN DESCRIPTION - LOCATION: LOCATION: TEETH

## 2024-05-29 ASSESSMENT — PAIN SCALES - GENERAL: PAINLEVEL_OUTOF10: 8

## 2024-05-29 NOTE — ED TRIAGE NOTES
Pt presents to ED with c/o lower right dental pain x2 days  Pt denies taking medication for pain today

## 2024-05-30 NOTE — ED PROVIDER NOTES
5/29/2024  8:06 PM        STOP taking these medications       ibuprofen (ADVIL;MOTRIN) 800 MG tablet Comments:   Reason for Stopping:         meloxicam (MOBIC) 7.5 MG tablet Comments:   Reason for Stopping:         diclofenac (VOLTAREN) 50 MG EC tablet Comments:   Reason for Stopping:         DULoxetine (CYMBALTA) 30 MG extended release capsule Comments:   Reason for Stopping:         Ubrogepant (UBRELVY) 50 MG TABS Comments:   Reason for Stopping:               I have seen and evaluated the patient autonomously. My supervision physician was on site and available for consultation if needed.     I am the Primary Clinician of Record.   Murphy Sanchez PA-C (electronically signed)    (Please note that parts of this dictation were completed with voice recognition software. Quite often unanticipated grammatical, syntax, homophones, and other interpretive errors are inadvertently transcribed by the computer software. Please disregards these errors. Please excuse any errors that have escaped final proofreading.)     Murphy Sanchez PA-C  05/29/24 2025

## 2024-06-26 RX ORDER — AZITHROMYCIN 250 MG/1
TABLET, FILM COATED ORAL
Qty: 6 TABLET | Refills: 0 | Status: SHIPPED | OUTPATIENT
Start: 2024-06-26

## 2024-06-26 NOTE — TELEPHONE ENCOUNTER
Last appointment: 5/26/23  Next appointment: none  Previous refill encounter(s): 3/21/24    Requested Prescriptions     Pending Prescriptions Disp Refills    azithromycin (ZITHROMAX) 250 MG tablet [Pharmacy Med Name: AZITHROMYCIN 250 MG TABLET] 6 tablet 0     Sig: TAKE 2 TABLETS BY MOUTH TODAY, THEN TAKE 1 TABLET DAILY FOR 4 DAYS AS DIRECTED         For Pharmacy Admin Tracking Only    Program: Medication Refill  CPA in place:    Recommendation Provided To:   Intervention Detail: New Rx: 1, reason: Patient Preference  Intervention Accepted By:   Gap Closed?:    Time Spent (min): 5

## 2024-08-09 ENCOUNTER — HOSPITAL ENCOUNTER (EMERGENCY)
Facility: HOSPITAL | Age: 39
Discharge: HOME | End: 2024-08-09
Payer: MEDICAID

## 2024-08-09 VITALS
WEIGHT: 180 LBS | TEMPERATURE: 98.6 F | BODY MASS INDEX: 32.92 KG/M2 | OXYGEN SATURATION: 99 % | RESPIRATION RATE: 16 BRPM | HEART RATE: 99 BPM | DIASTOLIC BLOOD PRESSURE: 99 MMHG | SYSTOLIC BLOOD PRESSURE: 145 MMHG

## 2024-08-09 DIAGNOSIS — Z20.822 ENCOUNTER FOR LABORATORY TESTING FOR COVID-19 VIRUS: Primary | ICD-10-CM

## 2024-08-09 LAB
FLUAV RNA SPEC QL NAA+PROBE: NOT DETECTED
FLUBV RNA SPEC QL NAA+PROBE: NOT DETECTED
SARS-COV-2 RNA RESP QL NAA+PROBE: DETECTED

## 2024-08-09 PROCEDURE — 99283 EMERGENCY DEPT VISIT LOW MDM: CPT

## 2024-08-09 PROCEDURE — 87636 SARSCOV2 & INF A&B AMP PRB: CPT

## 2024-08-09 ASSESSMENT — PAIN SCALES - GENERAL: PAINLEVEL_OUTOF10: 6

## 2024-08-09 ASSESSMENT — PAIN DESCRIPTION - DESCRIPTORS: DESCRIPTORS: THROBBING

## 2024-08-09 ASSESSMENT — PAIN DESCRIPTION - PAIN TYPE: TYPE: ACUTE PAIN

## 2024-08-09 ASSESSMENT — PAIN DESCRIPTION - LOCATION: LOCATION: TEETH

## 2024-08-09 ASSESSMENT — PAIN - FUNCTIONAL ASSESSMENT: PAIN_FUNCTIONAL_ASSESSMENT: 0-10

## 2024-08-09 NOTE — ED TRIAGE NOTES
Pt was exposed to covid so wants a test. Pt also complaining of lower back rt tooth pain since last night.

## 2024-08-09 NOTE — ED NOTES
Pt presents ambulatory to ED complaining of covid exposure and right lower dental pain since yesterday. Pt is alert and oriented x 4, RR even and unlabored, skin is warm and dry. Assesment completed and pt updated on plan of care.       Emergency Department Nursing Plan of Care       The Nursing Plan of Care is developed from the Nursing assessment and Emergency Department Attending provider initial evaluation.  The plan of care may be reviewed in the “ED Provider note”.    The Plan of Care was developed with the following considerations:   Patient / Family readiness to learn indicated by:verbalized understanding  Persons(s) to be included in education: patient  Barriers to Learning/Limitations:No    Signed     Alka Hernandez, TAMMY    8/9/2024   7:46 PM

## 2024-08-10 ASSESSMENT — ENCOUNTER SYMPTOMS
BACK PAIN: 0
SHORTNESS OF BREATH: 0
ABDOMINAL PAIN: 0

## 2024-08-10 NOTE — ED NOTES
Discharge instructions were given to the patient by Alka Esquivel.     The patient left the Emergency Department alert and oriented and in no acute distress with 0 prescriptions. The patient was encouraged to call or return to the ED for worsening issues or problems and was encouraged to schedule a follow up appointment for continuing care.     Ambulation assessment completed before discharge.  Pt left Emergency Department ambulating at baseline with no ortho devices  Ortho device education: none    The patient verbalized understanding of discharge instructions and prescriptions, all questions were answered. The patient has no further concerns at this time.

## 2024-08-10 NOTE — ED PROVIDER NOTES
[facility] EMERGENCY DEPT  EMERGENCY DEPARTMENT ENCOUNTER       Pt Name: Del Crisostomo  MRN: [id number]  Birthdate 1985  Date of evaluation: 8/9/2024  Provider: CYRUS Rinaldi - LORI   PCP: Alfonzo Hurt MD  Note Started: 5:05 PM 8/10/24     CHIEF COMPLAINT       Chief Complaint   Patient presents with    Concern For COVID-19    Dental Pain        HISTORY OF PRESENT ILLNESS: 1 or more elements      History Provided by: Patient   History is limited by: Nothing     Del Crisostomo is a 39 y.o. female who presents cc request for covid test.Patient's daughter  tested + .Mother has no complaints.     Nursing Notes were all reviewed and agreed with or any disagreements were addressed in the HPI.     REVIEW OF SYSTEMS      Review of Systems   Constitutional:  Negative for fever.   HENT:  Negative for congestion.    Eyes:  Negative for visual disturbance.   Respiratory:  Negative for shortness of breath.    Cardiovascular:  Negative for chest pain.   Gastrointestinal:  Negative for abdominal pain.   Genitourinary:  Positive for difficulty urinating.   Musculoskeletal:  Negative for back pain and neck pain.   Skin:  Negative for rash.   Neurological:  Negative for dizziness, weakness and headaches.   All other systems reviewed and are negative.       Positives and Pertinent negatives as per HPI.    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

## 2024-08-14 ENCOUNTER — OFFICE VISIT (OUTPATIENT)
Age: 39
End: 2024-08-14

## 2024-08-14 VITALS
HEART RATE: 103 BPM | SYSTOLIC BLOOD PRESSURE: 174 MMHG | TEMPERATURE: 98.2 F | DIASTOLIC BLOOD PRESSURE: 110 MMHG | OXYGEN SATURATION: 98 % | BODY MASS INDEX: 33.29 KG/M2 | RESPIRATION RATE: 18 BRPM | WEIGHT: 182 LBS

## 2024-08-14 DIAGNOSIS — Z11.52 ENCOUNTER FOR SCREENING LABORATORY TESTING FOR COVID-19 VIRUS: ICD-10-CM

## 2024-08-14 DIAGNOSIS — I10 ESSENTIAL (PRIMARY) HYPERTENSION: ICD-10-CM

## 2024-08-14 DIAGNOSIS — U07.1 COVID-19: Primary | ICD-10-CM

## 2024-08-14 LAB
LOT EXPIRE DATE: ABNORMAL
LOT KIT NUMBER: ABNORMAL
SARS-COV-2, POC: DETECTED
VALID INTERNAL CONTROL: ABNORMAL
VENDOR AND KIT NAME POC: ABNORMAL

## 2024-08-14 ASSESSMENT — ENCOUNTER SYMPTOMS
EYES NEGATIVE: 1
GASTROINTESTINAL NEGATIVE: 1
ALLERGIC/IMMUNOLOGIC NEGATIVE: 1
RESPIRATORY NEGATIVE: 1

## 2024-08-14 NOTE — PATIENT INSTRUCTIONS
Thank you for visiting [facility] today.    Please follow up with your PCP as needed.  -Rest  -Drink plenty of fluids to maintain hydration  -Ibuprofen/Tylenol for pain/fever/body aches    If you begin to have increased shortness of breath or chest pain, please go to the ER.     Elevated Blood Pressure: Care Instructions  Your Care Instructions    Blood pressure is a measure of how hard the blood pushes against the walls of your arteries. It's normal for blood pressure to go up and down throughout the day. But if it stays up over time, you have high blood pressure.  Two numbers tell you your blood pressure. The first number is the systolic pressure. It shows how hard the blood pushes when your heart is pumping. The second number is the diastolic pressure. It shows how hard the blood pushes between heartbeats, when your heart is relaxed and filling with blood. An ideal blood pressure in adults is less than 120/80 (say \"120 over 80\"). High blood pressure is 140/90 or higher. You have high blood pressure if your top number is 140 or higher or your bottom number is 90 or higher, or both.  The main test for high blood pressure is simple, fast, and painless. To diagnose high blood pressure, your doctor will test your blood pressure at different times. After testing your blood pressure, your doctor may ask you to test it again when you are home.  If you are diagnosed with high blood pressure, you can work with your doctor to make a long-term plan to manage it.  Follow-up care is a key part of your treatment and safety. Be sure to make and go to all appointments, and call your doctor if you are having problems. It's also a good idea to know your test results and keep a list of the medicines you take.  How can you care for yourself at home?  Do not smoke. Smoking increases your risk for heart attack and stroke. If you need help quitting, talk to your doctor about stop-smoking programs and medicines. These can

## 2024-08-14 NOTE — PROGRESS NOTES
Results for orders placed or performed in visit on 24   AMB POC SARS-COV-2   Result Value Ref Range    SARS-COV-2, POC Detected (A) Not Detected    Internal Control pass     Lot/Kit Number [id number]     Lot/Kit  date: 2026     Vendor and kit name       [employer] COVID-19 Ag CARD - This test is an antigen test (this test has been authorized under Emergency Use Authorization)     XR Results (most recent):  @[facility]([id number]:1)@         Review of Systems   Constitutional: Negative.    HENT: Negative.     Eyes: Negative.    Respiratory: Negative.     Cardiovascular: Negative.    Gastrointestinal: Negative.    Endocrine: Negative.    Genitourinary: Negative.    Musculoskeletal: Negative.    Skin: Negative.    Allergic/Immunologic: Negative.    Neurological: Negative.    Hematological: Negative.    Psychiatric/Behavioral: Negative.           Physical Exam  Constitutional:       Appearance: She is normal weight.   HENT:      Head: Normocephalic.      Right Ear: Tympanic membrane normal.      Left Ear: Tympanic membrane normal.      Nose: Nose normal.      Mouth/Throat:      Mouth: Mucous membranes are moist.      Pharynx: Oropharynx is clear.   Eyes:      Pupils: Pupils are equal, round, and reactive to light.   Cardiovascular:      Rate and Rhythm: Normal rate and regular rhythm.      Pulses: Normal pulses.      Heart sounds: Normal heart sounds.   Pulmonary:      Effort: Pulmonary effort is normal.      Breath sounds: Normal breath sounds.   Abdominal:      General: Abdomen is flat. Bowel sounds are normal.   Musculoskeletal:         General: Normal range of motion.      Cervical back: Normal range of motion.   Skin:     General: Skin is warm and dry.   Neurological:      General: No focal deficit present.      Mental Status: She is alert.   Psychiatric:         Mood and Affect: Mood normal.         Behavior: Behavior normal.        Vitals:    24 1432 24 1506   BP: (!)

## 2024-08-30 ENCOUNTER — HOSPITAL ENCOUNTER (EMERGENCY)
Facility: HOSPITAL | Age: 39
Discharge: HOME | End: 2024-08-30
Payer: MEDICAID

## 2024-08-30 ENCOUNTER — APPOINTMENT (OUTPATIENT)
Facility: HOSPITAL | Age: 39
End: 2024-08-30
Payer: MEDICAID

## 2024-08-30 VITALS
BODY MASS INDEX: 33.13 KG/M2 | SYSTOLIC BLOOD PRESSURE: 154 MMHG | HEART RATE: 76 BPM | OXYGEN SATURATION: 96 % | TEMPERATURE: 98.4 F | HEIGHT: 62 IN | DIASTOLIC BLOOD PRESSURE: 97 MMHG | WEIGHT: 180 LBS | RESPIRATION RATE: 16 BRPM
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
## 2024-08-30 DIAGNOSIS — S39.012A STRAIN OF LUMBAR REGION, INITIAL ENCOUNTER: ICD-10-CM

## 2024-08-30 DIAGNOSIS — S16.1XXA ACUTE STRAIN OF NECK MUSCLE, INITIAL ENCOUNTER: ICD-10-CM

## 2024-08-30 LAB — HCG UR QL: NEGATIVE

## 2024-08-30 PROCEDURE — 72050 X-RAY EXAM NECK SPINE 4/5VWS: CPT

## 2024-08-30 PROCEDURE — 6360000002 HC RX W HCPCS: Performed by: NURSE PRACTITIONER

## 2024-08-30 PROCEDURE — 6370000000 HC RX 637 (ALT 250 FOR IP): Performed by: NURSE PRACTITIONER

## 2024-08-30 PROCEDURE — 81025 URINE PREGNANCY TEST: CPT

## 2024-08-30 PROCEDURE — 96372 THER/PROPH/DIAG INJ SC/IM: CPT

## 2024-08-30 PROCEDURE — 72100 X-RAY EXAM L-S SPINE 2/3 VWS: CPT

## 2024-08-30 PROCEDURE — 99284 EMERGENCY DEPT VISIT MOD MDM: CPT

## 2024-08-30 RX ORDER — NAPROXEN 500 MG/1
500 TABLET ORAL 2 TIMES DAILY
Qty: 60 TABLET | Refills: 0 | Status: SHIPPED | OUTPATIENT
Start: 2024-08-30

## 2024-08-30 RX ORDER — ONDANSETRON 4 MG/1
8 TABLET, ORALLY DISINTEGRATING ORAL ONCE
Status: COMPLETED | OUTPATIENT
Start: 2024-08-30 | End: 2024-08-30

## 2024-08-30 RX ORDER — METHOCARBAMOL 750 MG/1
750 TABLET, FILM COATED ORAL 4 TIMES DAILY
Qty: 40 TABLET | Refills: 0 | Status: SHIPPED | OUTPATIENT
Start: 2024-08-30 | End: 2024-09-09

## 2024-08-30 RX ORDER — KETOROLAC TROMETHAMINE 30 MG/ML
30 INJECTION, SOLUTION INTRAMUSCULAR; INTRAVENOUS
Status: DISCONTINUED | OUTPATIENT
Start: 2024-08-30 | End: 2024-08-30

## 2024-08-30 RX ORDER — METHOCARBAMOL 500 MG/1
500 TABLET, FILM COATED ORAL ONCE
Status: COMPLETED | OUTPATIENT
Start: 2024-08-30 | End: 2024-08-30

## 2024-08-30 RX ORDER — KETOROLAC TROMETHAMINE 30 MG/ML
30 INJECTION, SOLUTION INTRAMUSCULAR; INTRAVENOUS
Status: COMPLETED | OUTPATIENT
Start: 2024-08-30 | End: 2024-08-30

## 2024-08-30 RX ADMIN — KETOROLAC TROMETHAMINE 30 MG: 30 INJECTION, SOLUTION INTRAMUSCULAR at 22:17

## 2024-08-30 RX ADMIN — METHOCARBAMOL TABLETS 500 MG: 500 TABLET, COATED ORAL at 22:08

## 2024-08-30 RX ADMIN — ONDANSETRON 8 MG: 4 TABLET, ORALLY DISINTEGRATING ORAL at 22:18

## 2024-08-30 ASSESSMENT — LIFESTYLE VARIABLES
HOW OFTEN DO YOU HAVE A DRINK CONTAINING ALCOHOL: NEVER
HOW MANY STANDARD DRINKS CONTAINING ALCOHOL DO YOU HAVE ON A TYPICAL DAY: PATIENT DOES NOT DRINK

## 2024-08-30 ASSESSMENT — PAIN DESCRIPTION - PAIN TYPE: TYPE: ACUTE PAIN

## 2024-08-30 ASSESSMENT — PAIN - FUNCTIONAL ASSESSMENT: PAIN_FUNCTIONAL_ASSESSMENT: 0-10

## 2024-08-30 ASSESSMENT — PAIN DESCRIPTION - DESCRIPTORS
DESCRIPTORS: ACHING
DESCRIPTORS: ACHING

## 2024-08-30 ASSESSMENT — PAIN DESCRIPTION - LOCATION
LOCATION: BACK;NECK
LOCATION: HEAD;NECK;BACK

## 2024-08-30 ASSESSMENT — PAIN SCALES - GENERAL
PAINLEVEL_OUTOF10: 8
PAINLEVEL_OUTOF10: 8

## 2024-08-30 ASSESSMENT — PAIN DESCRIPTION - ORIENTATION: ORIENTATION: POSTERIOR
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
## 2024-08-31 NOTE — ED NOTES
Discharge instructions were given to the patient by irma rm.     The patient left the Emergency Department alert and oriented and in no acute distress with 2 prescriptions. The patient was encouraged to call or return to the ED for worsening issues or problems and was encouraged to schedule a follow up appointment for continuing care.     Ambulation assessment completed before discharge.  Pt left Emergency Department ambulating at baseline with no ortho devices  Ortho device education: none    The patient verbalized understanding of discharge instructions and prescriptions, all questions were answered. The patient has no further concerns at this time.
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
## 2024-09-24 ENCOUNTER — TELEPHONE (OUTPATIENT)
Age: 39
End: 2024-09-24
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
## 2024-09-27 ENCOUNTER — OFFICE VISIT (OUTPATIENT)
Age: 39
End: 2024-09-27

## 2024-09-27 VITALS
TEMPERATURE: 98.2 F | SYSTOLIC BLOOD PRESSURE: 156 MMHG | DIASTOLIC BLOOD PRESSURE: 95 MMHG | BODY MASS INDEX: 32.92 KG/M2 | WEIGHT: 180 LBS | RESPIRATION RATE: 18 BRPM | OXYGEN SATURATION: 98 % | HEART RATE: 81 BPM

## 2024-09-27 DIAGNOSIS — S99.921A TOE INJURY, RIGHT, INITIAL ENCOUNTER: Primary | ICD-10-CM

## 2024-09-27 DIAGNOSIS — M79.671 RIGHT FOOT PAIN: ICD-10-CM

## 2024-09-27 DIAGNOSIS — I10 ESSENTIAL (PRIMARY) HYPERTENSION: ICD-10-CM

## 2024-09-27 ASSESSMENT — ENCOUNTER SYMPTOMS
RESPIRATORY NEGATIVE: 1
EYES NEGATIVE: 1
GASTROINTESTINAL NEGATIVE: 1
ALLERGIC/IMMUNOLOGIC NEGATIVE: 1

## 2024-10-03 ENCOUNTER — OFFICE VISIT (OUTPATIENT)
Age: 39
End: 2024-10-03
Payer: MEDICAID

## 2024-10-03 VITALS
WEIGHT: 182 LBS | RESPIRATION RATE: 18 BRPM | OXYGEN SATURATION: 98 % | HEART RATE: 78 BPM | BODY MASS INDEX: 33.29 KG/M2 | TEMPERATURE: 97.5 F | DIASTOLIC BLOOD PRESSURE: 109 MMHG | SYSTOLIC BLOOD PRESSURE: 169 MMHG

## 2024-10-03 DIAGNOSIS — M54.50 ACUTE BILATERAL LOW BACK PAIN WITHOUT SCIATICA: ICD-10-CM

## 2024-10-03 DIAGNOSIS — F31.81 BIPOLAR 2 DISORDER, MAJOR DEPRESSIVE EPISODE (HCC): Primary | ICD-10-CM

## 2024-10-03 DIAGNOSIS — M54.9 UPPER BACK PAIN: ICD-10-CM

## 2024-10-03 DIAGNOSIS — M25.512 ACUTE PAIN OF LEFT SHOULDER: ICD-10-CM

## 2024-10-03 DIAGNOSIS — M54.2 NECK PAIN, ACUTE: ICD-10-CM
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
## 2024-10-03 DIAGNOSIS — M54.9 DORSALGIA, UNSPECIFIED: ICD-10-CM

## 2024-10-03 PROCEDURE — 99214 OFFICE O/P EST MOD 30 MIN: CPT | Performed by: FAMILY MEDICINE

## 2024-10-03 PROCEDURE — 3080F DIAST BP >= 90 MM HG: CPT | Performed by: FAMILY MEDICINE

## 2024-10-03 PROCEDURE — 3077F SYST BP >= 140 MM HG: CPT | Performed by: FAMILY MEDICINE

## 2024-10-03 RX ORDER — AMLODIPINE BESYLATE 10 MG/1
10 TABLET ORAL DAILY
Qty: 30 TABLET | Refills: 2 | Status: SHIPPED | OUTPATIENT
Start: 2024-10-03

## 2024-10-03 RX ORDER — MELOXICAM 7.5 MG/1
7.5 TABLET ORAL 2 TIMES DAILY PRN
Qty: 40 TABLET | Refills: 1 | Status: SHIPPED | OUTPATIENT
Start: 2024-10-03

## 2024-10-03 RX ORDER — METHYLPREDNISOLONE 4 MG
TABLET, DOSE PACK ORAL
Qty: 21 TABLET | Refills: 0 | Status: SHIPPED | OUTPATIENT
Start: 2024-10-03

## 2024-10-03 SDOH — ECONOMIC STABILITY: INCOME INSECURITY: HOW HARD IS IT FOR YOU TO PAY FOR THE VERY BASICS LIKE FOOD, HOUSING, MEDICAL CARE, AND HEATING?: NOT HARD AT ALL

## 2024-10-03 SDOH — ECONOMIC STABILITY: TRANSPORTATION INSECURITY
IN THE PAST 12 MONTHS, HAS LACK OF TRANSPORTATION KEPT YOU FROM MEETINGS, WORK, OR FROM GETTING THINGS NEEDED FOR DAILY LIVING?: NO

## 2024-10-03 SDOH — ECONOMIC STABILITY: FOOD INSECURITY: WITHIN THE PAST 12 MONTHS, THE FOOD YOU BOUGHT JUST DIDN'T LAST AND YOU DIDN'T HAVE MONEY TO GET MORE.: PATIENT DECLINED

## 2024-10-03 SDOH — ECONOMIC STABILITY: FOOD INSECURITY: WITHIN THE PAST 12 MONTHS, YOU WORRIED THAT YOUR FOOD WOULD RUN OUT BEFORE YOU GOT MONEY TO BUY MORE.: PATIENT DECLINED

## 2024-10-03 ASSESSMENT — PATIENT HEALTH QUESTIONNAIRE - PHQ9
SUM OF ALL RESPONSES TO PHQ9 QUESTIONS 1 & 2: 0
5. POOR APPETITE OR OVEREATING: NOT AT ALL
SUM OF ALL RESPONSES TO PHQ QUESTIONS 1-9: 0
8. MOVING OR SPEAKING SO SLOWLY THAT OTHER PEOPLE COULD HAVE NOTICED. OR THE OPPOSITE, BEING SO FIGETY OR RESTLESS THAT YOU HAVE BEEN MOVING AROUND A LOT MORE THAN USUAL: NOT AT ALL
3. TROUBLE FALLING OR STAYING ASLEEP: NOT AT ALL
SUM OF ALL RESPONSES TO PHQ QUESTIONS 1-9: 0
1. LITTLE INTEREST OR PLEASURE IN DOING THINGS: NOT AT ALL
9. THOUGHTS THAT YOU WOULD BE BETTER OFF DEAD, OR OF HURTING YOURSELF: NOT AT ALL
7. TROUBLE CONCENTRATING ON THINGS, SUCH AS READING THE NEWSPAPER OR WATCHING TELEVISION: NOT AT ALL
6. FEELING BAD ABOUT YOURSELF - OR THAT YOU ARE A FAILURE OR HAVE LET YOURSELF OR YOUR FAMILY DOWN: NOT AT ALL
10. IF YOU CHECKED OFF ANY PROBLEMS, HOW DIFFICULT HAVE THESE PROBLEMS MADE IT FOR YOU TO DO YOUR WORK, TAKE CARE OF THINGS AT HOME, OR GET ALONG WITH OTHER PEOPLE: NOT DIFFICULT AT ALL
2. FEELING DOWN, DEPRESSED OR HOPELESS: NOT AT ALL
4. FEELING TIRED OR HAVING LITTLE ENERGY: NOT AT ALL
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
## 2024-10-03 NOTE — PATIENT INSTRUCTIONS
[facility] Financial Resources*  (Call [employer]/211 if need more resources.)    Medical Care  [facility] Financial Assistance  What they offer: The [employer] Financial Assistance Program helps uninsured patients who do not qualify for government-sponsored health insurance and cannot afford to pay for their medical care. Insured patients may also qualify for assistance based on family income, family size, and medical needs.  Phone Number: [phone]  How to apply for the [employer] Financial Assistance Program:  Option 1: To apply for financial assistance, a patient (or their family or other provider) should fill out the Financial Assistance Application. Copies of the Financial Assistance Application and the FAP may be obtained for free by calling the [facility] customer service department at [phone].  Option 2: The Financial Assistance Application and policy may be obtained for free by downloading a copy from the [employer] website:  https://www.[employer]/patient-resources/financial-assistance  Applications are available in several languages on the website    [facility] Financial Assistance  What they offer: [facility] has a Financial Assistance Policy that provides free or discounted health care to qualified patients.  Website:  https://Gatheredtable/patient-financial/pricing  Phone Number for Patient Benefit Advisors: [phone]    [facility] Financial Assistance  What they offer: Help with understanding a bill, finding out what insurance pays, applying for financial aid, or setting up a payment plan.  Website:  https://[employer]/services/billing-insurance/iafeytnzq-krdyqxtvkq-zjwjtsefhfr  Financial Counseling Call Center: [phone]      [employer]  What they offer:   Mobile healthcare resources for uninsured patients.  Website:  [email or web address]/[facility]-care-a-van  Contact: [phone] between 7:00 - 8:30 am to schedule same 
There are no Wet Read(s) to document.

## 2024-10-03 NOTE — ASSESSMENT & PLAN NOTE
Orders:    [facility] - Physical Therapy at [facility]    SAMANTHA - Maldonado Noble MD, Orthopedic Surgery (back, neck, spine), [place]    methylPREDNISolone (MEDROL DOSEPACK) 4 MG tablet; Take by mouth.    amLODIPine (NORVASC) 10 MG tablet; Take 1 tablet by mouth daily    meloxicam (MOBIC) 7.5 MG tablet; Take 1 tablet by mouth 2 times daily as needed for Pain

## 2024-11-05 ENCOUNTER — OFFICE VISIT (OUTPATIENT)
Age: 39
End: 2024-11-05

## 2024-11-05 VITALS
HEART RATE: 87 BPM | TEMPERATURE: 98.7 F | WEIGHT: 181.5 LBS | BODY MASS INDEX: 33.2 KG/M2 | OXYGEN SATURATION: 97 % | DIASTOLIC BLOOD PRESSURE: 114 MMHG | SYSTOLIC BLOOD PRESSURE: 170 MMHG

## 2024-11-05 DIAGNOSIS — H92.01 RIGHT EAR PAIN: Primary | ICD-10-CM

## 2024-11-05 NOTE — PROGRESS NOTES
lidocaine (HM LIDOCAINE PATCH) 4 % external patch Place 1 patch onto the skin daily 10/17/23   Meredith Solis PA   fluticasone (FLONASE) 50 MCG/ACT nasal spray 1 spray by Nasal route daily 10/6/23   Alfonzo Hurt MD   cetirizine (ZYRTEC) 10 MG tablet Take by mouth daily as needed 1/13/23   Automatic Reconciliation, Ar   clonazePAM (KLONOPIN) 1 MG tablet Take by mouth 2 times daily as needed. 7/27/21   Automatic Reconciliation, Ar   lamoTRIgine (LAMICTAL) 100 MG tablet Take by mouth 2 times daily 5/25/21   Automatic Reconciliation, Ar   lamoTRIgine (LAMICTAL) 200 MG tablet TAKE 1 TABLET BY MOUTH EVERY DAY AS DIRECTED 12/31/22   Automatic Reconciliation, Ar   loratadine (CLARITIN) 10 MG tablet TAKE 1 TABLET BY MOUTH DAILY AS NEEDED FOR ALLERGIES. 3/2/21   Automatic Reconciliation, Ar   norethindrone-ethinyl estradiol (MICROGESTIN 1/20) 1-20 MG-MCG per tablet Take 1 tablet by mouth daily    Automatic Reconciliation, Ar   QUEtiapine (SEROQUEL) 100 MG tablet TAKE 1 TABLET BY MOUTH EVERY DAY AT BEDTIME AS NEEDED 12/29/22   Automatic Reconciliation, Ar        Past Medical History:   Diagnosis Date    Anxiety disorder     Arthritis     Asthma     Bipolar 1 disorder (HCC)     Depression     Frequent headaches     Frequent headaches     Hypertension     Migraine     Muscle pain     Other ill-defined conditions(799.89)     dysplagia    Psychiatric disorder     bipolar, anxiety, depression, PTSD    PTSD (post-traumatic stress disorder)         Past Surgical History:   Procedure Laterality Date    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

## 2024-11-05 NOTE — PATIENT INSTRUCTIONS
Exam and history are consistent with pain after recent root canal  -Recommend prompt follow-up with dentist, who patient states she has follow-up with in the next week  -Ibuprofen 600 mg every 6 hours as needed and Tylenol 1000 mg every 8 hours as needed for pain control    If symptoms persist or worsen, please contact PCP and/or return to Urgent Care

## 2024-12-13 NOTE — ED PROVIDER NOTES
EMERGENCY DEPARTMENT HISTORY AND PHYSICAL EXAM 
 
 
Date: 7/17/2018 Patient Name: [facility] Rachel JASSO History of Presenting Illness Chief Complaint Patient presents with  Vomiting  
  and diarrhea that started last night History Provided By: Patient HPI: [facility] Rachel JASSO, 29 y.o. female with PMHx significant for HTN, asthma, psychiatric disorders, presents ambulatory to the ED with c/o persistent N/V/D since last night. She reports last episode of vomiting was ~5 hours ago and diarrhea ~3 hours ago. Pt endorses associated generalized weakness. She states she hasn't been able to eat anything today. Pt notes drinking ginger ale with some improvement in nausea; however, she continued to experience vomiting. She reports some generalized cramping with vomiting earlier, but denies any currently. Additionally, she notes throbbing R sided HA that began 3 hours ago. Pt denies taking any modifying factors related to HA and states she did not take any analgesics. Pt denies any recent sick contact. Ps specifically denies any recent fevers, chills, hematochezia, numbness/tingling, weakness, CP, or SOB. There are no other complaints, changes, or physical findings at this time. Current Outpatient Prescriptions Medication Sig Dispense Refill  ondansetron (ZOFRAN ODT) 4 mg disintegrating tablet Take 1 Tab by mouth every eight (8) hours as needed for Nausea. 10 Tab 0  
 loperamide (IMODIUM A-D) 2 mg tablet Take 1 Tab by mouth four (4) times daily as needed for Diarrhea for up to 10 days. 16 Tab 0  METOPROLOL SUCCINATE PO Take  by mouth daily.  TRIAMTERENE-HYDROCHLOROTHIAZID PO Take  by mouth daily.  CYCLOBENZAPRINE HCL (FLEXERIL PO) Take  by mouth as needed.  cyclobenzaprine (FLEXERIL) 5 mg tablet Take 1 Tab by mouth three (3) times daily (with meals). 90 Tab 1  
 SUMAtriptan (IMITREX) 100 mg tablet Take  by mouth once as needed for Migraine.     
 loratadine (CLARITIN) 10 mg tablet Take 10 mg by mouth.  Hydrochlorothiazide (HYDRODIURIL) 12.5 mg tablet Take 12.5 mg by mouth daily.  montelukast (SINGULAIR) 10 mg tablet  traZODone (DESYREL) 100 mg tablet 100 mg nightly.  albuterol (PROVENTIL HFA, VENTOLIN HFA) 90 mcg/actuation inhaler Take 1 puff by inhalation every four (4) hours as needed for Wheezing. 1 Inhaler 3  
 lisinopril (PRINIVIL, ZESTRIL) 20 mg tablet Take 1 Tab by mouth daily. (Patient taking differently: Take 30 mg by mouth daily.) 90 Tab 3 Past History Past Medical History: 
Past Medical History:  
Diagnosis Date  Anxiety disorder  Arthritis  Asthma  Bipolar 1 disorder ([address]  Depression  Frequent headaches  Frequent headaches  Hypertension  Migraine  Muscle pain  Other ill-defined conditions(799.89)   
 dysplagia  Psychiatric disorder   
 bipolar, anxiety, depression, PTSD  PTSD (post-traumatic stress disorder) Past Surgical History: 
Past Surgical History:  
Procedure Laterality Date  HX AMPUTATION Left   
 left hand middle finger. 8/2014 [facility]  3-  
 birth  HX OTHER SURGICAL Dysplasia Family History: 
Family History Problem Relation Age of Onset  Cancer Mother  Diabetes Father  Other Father Aneursym  Migraines Sister Social History: 
Social History Substance Use Topics  Smoking status: Current Every Day Smoker Packs/day: 0.50 Types: Cigarettes  Smokeless tobacco: Never Used  Alcohol use 0.0 oz/week Comment: social  
 
 
Allergies: Allergies Allergen Reactions  Codeine Other (comments) Hallucinations, raise blood pressure  Lortab [Hydrocodone-Acetaminophen] Hives and Itching  Tramadol Nausea Only Headache Review of Systems Review of Systems Constitutional: Negative for chills, fever and unexpected weight change. HENT: Negative. Negative for congestion and trouble swallowing.    
Eyes: Negative for discharge. Respiratory: Negative. Negative for cough, chest tightness and shortness of breath. Cardiovascular: Negative. Negative for chest pain. Gastrointestinal: Positive for abdominal pain (none currently), diarrhea, nausea and vomiting. Negative for abdominal distention and constipation. Endocrine: Negative. Genitourinary: Negative. Negative for difficulty urinating, dysuria, frequency and urgency. Musculoskeletal: Negative. Negative for arthralgias and myalgias. Skin: Negative. Negative for color change. Allergic/Immunologic: Negative. Neurological: Positive for weakness (generalized) and headaches. Negative for dizziness, speech difficulty and numbness. Hematological: Negative. Psychiatric/Behavioral: Negative. Negative for agitation and confusion. All other systems reviewed and are negative. Physical Exam  
Physical Exam  
Constitutional: She is oriented to person, place, and time. She appears well-developed and well-nourished. HENT:  
Head: Normocephalic and atraumatic. Eyes: Conjunctivae and EOM are normal.  
Neck: Neck supple. Cardiovascular: Normal rate, regular rhythm and intact distal pulses. Pulmonary/Chest: No accessory muscle usage. No respiratory distress. Abdominal: Soft. Normal appearance. There is no tenderness. There is no rebound and no guarding. Mildly hyperactive bowel sounds Musculoskeletal: Normal range of motion. Neurological: She is alert and oriented to person, place, and time. Skin: Skin is warm and dry. Psychiatric: She has a normal mood and affect. Her behavior is normal. Thought content normal.  
Nursing note and vitals reviewed. Diagnostic Study Results Labs - No results found for this or any previous visit (from the past 12 hour(s)). Radiologic Studies - No orders to display Medical Decision Making I am the first provider for this patient.  
 
I reviewed the vital signs, available nursing notes, past medical history, past surgical history, family history and social history. Vital Signs-Reviewed the patient's vital signs. Patient Vitals for the past 12 hrs: 
 Temp Pulse Resp BP SpO2  
07/17/18 2205 - - - - 97 % 07/17/18 2044 98.2 °F (36.8 °C) 78 16 134/89 97 % Records Reviewed: Nursing Notes and Old Medical Records Provider Notes (Medical Decision Making): DDx: viral syndrome, gastroenteritis ED Course:  
Initial assessment performed. The patients presenting problems have been discussed, and they are in agreement with the care plan formulated and outlined with them. I have encouraged them to ask questions as they arise throughout their visit. Discharge Note: 
10:45 PM 
The patient has been re-evaluated and is ready for discharge. Reviewed available results with patient. Counseled patient on diagnosis and care plan. Patient has expressed understanding, and all questions have been answered. Patient agrees with plan and agrees to follow up as recommended, or to return to the ED if their symptoms worsen. Discharge instructions have been provided and explained to the patient, along with reasons to return to the ED. PLAN: 
1. Discharge Medication List as of 7/17/2018 10:45 PM  
  
START taking these medications Details  
ondansetron (ZOFRAN ODT) 4 mg disintegrating tablet Take 1 Tab by mouth every eight (8) hours as needed for Nausea. , Print, Disp-10 Tab, R-0  
  
loperamide (IMODIUM A-D) 2 mg tablet Take 1 Tab by mouth four (4) times daily as needed for Diarrhea for up to 10 days. , Print, Disp-16 Tab, R-0  
  
  
CONTINUE these medications which have NOT CHANGED Details METOPROLOL SUCCINATE PO Take  by mouth daily. , Historical Med  
  
TRIAMTERENE-HYDROCHLOROTHIAZID PO Take  by mouth daily. , Historical Med  
  
!! CYCLOBENZAPRINE HCL (FLEXERIL PO) Take  by mouth as needed., Historical Med  
  
!! cyclobenzaprine (FLEXERIL) 5 mg tablet Take 1 Tab by mouth three (3) times daily (with meals). , Normal, Disp-90 Tab, R-1  
  
SUMAtriptan (IMITREX) 100 mg tablet Take  by mouth once as needed for Migraine., Historical Med  
  
loratadine (CLARITIN) 10 mg tablet Take 10 mg by mouth., Historical Med Hydrochlorothiazide (HYDRODIURIL) 12.5 mg tablet Take 12.5 mg by mouth daily. , Historical Med  
  
montelukast (SINGULAIR) 10 mg tablet Historical Med  
  
traZODone (DESYREL) 100 mg tablet 100 mg nightly., Historical Med  
  
albuterol (PROVENTIL HFA, VENTOLIN HFA) 90 mcg/actuation inhaler Take 1 puff by inhalation every four (4) hours as needed for Wheezing., Normal, Disp-1 Inhaler, R-3  
  
lisinopril (PRINIVIL, ZESTRIL) 20 mg tablet Take 1 Tab by mouth daily. , Normal, Disp-90 Tab, R-3  
  
 !! - Potential duplicate medications found. Please discuss with provider. 2.  
Follow-up Information Follow up With Details Comments Contact Info [address] Schedule an appointment as soon as possible for a visit  [address] [place] [address] [address] [facility] Return to ED if worse Diagnosis Clinical Impression:  
1. Gastroenteritis, acute Attestations: This note is prepared by Destinee Curry, acting as Scribe for Cassie Murguia MD. 
 
The scribe's documentation has been prepared under my direction and personally reviewed by me in its entirety. I confirm that the note above accurately reflects all work, treatment, procedures, and medical decision making performed by me.  
Cassie Murguia MD 
 
 
 [phone]/ [phone]

## 2025-01-13 ENCOUNTER — OFFICE VISIT (OUTPATIENT)
Age: 40
End: 2025-01-13

## 2025-01-13 VITALS
OXYGEN SATURATION: 99 % | RESPIRATION RATE: 18 BRPM | WEIGHT: 181 LBS | SYSTOLIC BLOOD PRESSURE: 171 MMHG | BODY MASS INDEX: 33.11 KG/M2 | TEMPERATURE: 99.2 F | DIASTOLIC BLOOD PRESSURE: 111 MMHG | HEART RATE: 102 BPM

## 2025-01-13 DIAGNOSIS — T36.95XA ANTIBIOTIC-INDUCED YEAST INFECTION: ICD-10-CM

## 2025-01-13 DIAGNOSIS — H66.92 ACUTE OTITIS MEDIA, LEFT: Primary | ICD-10-CM

## 2025-01-13 DIAGNOSIS — B37.9 ANTIBIOTIC-INDUCED YEAST INFECTION: ICD-10-CM

## 2025-01-13 DIAGNOSIS — I10 ESSENTIAL (PRIMARY) HYPERTENSION: ICD-10-CM

## 2025-01-13 RX ORDER — FLUCONAZOLE 150 MG/1
150 TABLET ORAL DAILY
Qty: 2 TABLET | Refills: 0 | Status: SHIPPED | OUTPATIENT
Start: 2025-01-13 | End: 2025-01-15

## 2025-01-13 ASSESSMENT — ENCOUNTER SYMPTOMS
RESPIRATORY NEGATIVE: 1
GASTROINTESTINAL NEGATIVE: 1
EYES NEGATIVE: 1
ALLERGIC/IMMUNOLOGIC NEGATIVE: 1

## 2025-01-13 NOTE — PATIENT INSTRUCTIONS
[id number] [employer], [employer]. Care instructions adapted under license by [facility]. If you have questions about a medical condition or this instruction, always ask your healthcare professional. [employer] disclaims any warranty or liability for your use of this information.

## 2025-01-13 NOTE — PROGRESS NOTES
2025   Del Crisostomo (: 1985) is a 39 y.o. female, New patient, here for evaluation of the following chief complaint(s):  Ear Pain (C/o left earache and sneezing. Sx started this morning. )     ASSESSMENT/PLAN:  Below is the assessment and plan developed based on review of pertinent history, physical exam, labs, studies, and medications.  1. Acute otitis media, left  -     amoxicillin-clavulanate (AUGMENTIN) 875-125 MG per tablet; Take 1 tablet by mouth 2 times daily for 7 days, Disp-14 tablet, R-0Normal  2. Essential (primary) hypertension  3. Antibiotic-induced yeast infection  -     fluconazole (DIFLUCAN) 150 MG tablet; Take 1 tablet by mouth daily for 2 doses, Disp-2 tablet, R-0Normal         Handout given with care instructions  2. OTC for symptom management. Increase fluid intake, ensure adequate nutritional intake.  3. Follow up with PCP as needed.  4. Go to ED with development of any acute symptoms.     Follow up:  Return if symptoms worsen or fail to improve.  Follow up immediately for any new, worsening or changes or if symptoms are not improving over the next 5-7 days.     SUBJECTIVE/OBJECTIVE:     Del Crisostomo is a 39 y.o. female who complains of left ear pain and sneezing for 1 day. She denies a history of chest pain, chills, dizziness, fatigue, fevers, myalgias, nausea, shortness of breath, and vomiting and denies a history of asthma.  Patient has environmental/ seasonal allergies. Patient denies exposure to smoke / cigarettes.Patient denies exposure to  sick contacts . Patient also noted that OTC remedies did not help.       Review of Systems   Constitutional: Negative.    HENT:  Positive for ear pain and sneezing.    Eyes: Negative.    Respiratory: Negative.     Cardiovascular: Negative.    Gastrointestinal: Negative.    Endocrine: Negative.    Genitourinary: Negative.    Musculoskeletal: Negative.    Skin: Negative.    Allergic/Immunologic: Negative.    Neurological: Negative.

## 2025-05-31 NOTE — DISCHARGE INSTRUCTIONS
It was a pleasure taking care of you at [facility] Emergency Department today.  We know that when you come to [facility], you are entrusting us with your health, comfort, and safety.  Our physicians and nurses honor that trust, and we truly appreciate the opportunity to care for you and your loved ones.      We also value our feedback.  If you receive a survey about your Emergency Department experience today, please fill it out.  We care about our patients' feedback, and we listen to what you have to say.  Thank you!    Much better  Finish antibiotic

## 2025-07-21 ENCOUNTER — OFFICE VISIT (OUTPATIENT)
Age: 40
End: 2025-07-21
Payer: MEDICAID

## 2025-07-21 VITALS
TEMPERATURE: 97.5 F | RESPIRATION RATE: 19 BRPM | BODY MASS INDEX: 35.12 KG/M2 | HEART RATE: 77 BPM | OXYGEN SATURATION: 98 % | DIASTOLIC BLOOD PRESSURE: 89 MMHG | WEIGHT: 192 LBS | SYSTOLIC BLOOD PRESSURE: 128 MMHG

## 2025-07-21 DIAGNOSIS — F32.A ANXIETY AND DEPRESSION: Primary | ICD-10-CM

## 2025-07-21 DIAGNOSIS — F41.9 ANXIETY AND DEPRESSION: Primary | ICD-10-CM

## 2025-07-21 DIAGNOSIS — J45.40 MODERATE PERSISTENT ASTHMA WITHOUT COMPLICATION: ICD-10-CM

## 2025-07-21 DIAGNOSIS — F31.81 BIPOLAR 2 DISORDER, MAJOR DEPRESSIVE EPISODE (HCC): ICD-10-CM

## 2025-07-21 DIAGNOSIS — M54.9 DORSALGIA, UNSPECIFIED: ICD-10-CM

## 2025-07-21 DIAGNOSIS — M54.9 UPPER BACK PAIN: ICD-10-CM

## 2025-07-21 PROCEDURE — 3079F DIAST BP 80-89 MM HG: CPT | Performed by: FAMILY MEDICINE

## 2025-07-21 PROCEDURE — 99214 OFFICE O/P EST MOD 30 MIN: CPT | Performed by: FAMILY MEDICINE

## 2025-07-21 PROCEDURE — 3074F SYST BP LT 130 MM HG: CPT | Performed by: FAMILY MEDICINE

## 2025-07-21 RX ORDER — CLONAZEPAM 1 MG/1
1 TABLET ORAL 2 TIMES DAILY PRN
Qty: 60 TABLET | Refills: 2 | Status: CANCELLED | OUTPATIENT
Start: 2025-07-21

## 2025-07-21 RX ORDER — CETIRIZINE HYDROCHLORIDE 10 MG/1
10 TABLET ORAL DAILY PRN
Qty: 90 TABLET | Refills: 1 | Status: SHIPPED | OUTPATIENT
Start: 2025-07-21

## 2025-07-21 RX ORDER — LORATADINE 10 MG/1
10 TABLET ORAL DAILY PRN
Qty: 90 TABLET | Refills: 1 | Status: SHIPPED | OUTPATIENT
Start: 2025-07-21

## 2025-07-21 RX ORDER — CLONIDINE HYDROCHLORIDE 0.1 MG/1
0.1 TABLET ORAL 2 TIMES DAILY
Qty: 60 TABLET | Refills: 3 | Status: SHIPPED | OUTPATIENT
Start: 2025-07-21

## 2025-07-21 RX ORDER — AMLODIPINE BESYLATE 10 MG/1
10 TABLET ORAL DAILY
Qty: 30 TABLET | Refills: 2 | Status: CANCELLED | OUTPATIENT
Start: 2025-07-21

## 2025-07-21 RX ORDER — ALBUTEROL SULFATE 90 UG/1
2 INHALANT RESPIRATORY (INHALATION) EVERY 6 HOURS PRN
Qty: 8.5 EACH | Refills: 4 | Status: SHIPPED | OUTPATIENT
Start: 2025-07-21

## 2025-07-21 RX ORDER — LIDOCAINE 4 G/G
1 PATCH TOPICAL DAILY
Qty: 15 EACH | Refills: 0 | Status: SHIPPED | OUTPATIENT
Start: 2025-07-21

## 2025-07-21 RX ORDER — FUROSEMIDE 20 MG/1
20 TABLET ORAL DAILY
Qty: 14 TABLET | Refills: 0 | Status: SHIPPED | OUTPATIENT
Start: 2025-07-21 | End: 2025-08-04

## 2025-07-21 RX ORDER — QUETIAPINE FUMARATE 100 MG/1
100 TABLET, FILM COATED ORAL NIGHTLY PRN
Qty: 30 TABLET | Refills: 1 | Status: SHIPPED | OUTPATIENT
Start: 2025-07-21

## 2025-07-21 RX ORDER — FLUTICASONE PROPIONATE 50 MCG
1 SPRAY, SUSPENSION (ML) NASAL DAILY
Qty: 16 G | Refills: 1 | Status: SHIPPED | OUTPATIENT
Start: 2025-07-21

## 2025-07-21 RX ORDER — MELOXICAM 7.5 MG/1
7.5 TABLET ORAL 2 TIMES DAILY PRN
Qty: 40 TABLET | Refills: 1 | Status: SHIPPED | OUTPATIENT
Start: 2025-07-21

## 2025-07-21 SDOH — ECONOMIC STABILITY: FOOD INSECURITY: WITHIN THE PAST 12 MONTHS, YOU WORRIED THAT YOUR FOOD WOULD RUN OUT BEFORE YOU GOT MONEY TO BUY MORE.: PATIENT DECLINED

## 2025-07-21 SDOH — ECONOMIC STABILITY: FOOD INSECURITY: WITHIN THE PAST 12 MONTHS, THE FOOD YOU BOUGHT JUST DIDN'T LAST AND YOU DIDN'T HAVE MONEY TO GET MORE.: PATIENT DECLINED

## 2025-07-21 SDOH — ECONOMIC STABILITY: TRANSPORTATION INSECURITY
IN THE PAST 12 MONTHS, HAS THE LACK OF TRANSPORTATION KEPT YOU FROM MEDICAL APPOINTMENTS OR FROM GETTING MEDICATIONS?: PATIENT DECLINED

## 2025-07-21 SDOH — ECONOMIC STABILITY: INCOME INSECURITY: IN THE LAST 12 MONTHS, WAS THERE A TIME WHEN YOU WERE NOT ABLE TO PAY THE MORTGAGE OR RENT ON TIME?: PATIENT DECLINED

## 2025-07-21 SDOH — ECONOMIC STABILITY: TRANSPORTATION INSECURITY
IN THE PAST 12 MONTHS, HAS LACK OF TRANSPORTATION KEPT YOU FROM MEETINGS, WORK, OR FROM GETTING THINGS NEEDED FOR DAILY LIVING?: PATIENT DECLINED

## 2025-07-21 ASSESSMENT — PATIENT HEALTH QUESTIONNAIRE - PHQ9
2. FEELING DOWN, DEPRESSED OR HOPELESS: NOT AT ALL
1. LITTLE INTEREST OR PLEASURE IN DOING THINGS: NOT AT ALL
SUM OF ALL RESPONSES TO PHQ QUESTIONS 1-9: 0

## 2025-07-21 NOTE — PROGRESS NOTES
Del Crisostomo (:  1985) is a 40 y.o. female,Established patient, here for evaluation of the following chief complaint(s):  Hypertension (Follow up ) and Swelling    patient with hx of HTN, bipolar straight chronic back pain chronic asthma chronic depression denies suicidal homicidal ideation has no illusion hallucination has been compliant with medication does see psychiatrist on regular basis requesting refill for some medication stating that she has been unable to get a hold of her psychiatrist and does not want to run out of her medication because she has been taking only medication for many years and if she runs out she gets into withdrawal symptoms stating that the pain is 6 out of 10 over-the-counter pain medication not helping present for Bp check , pt has been compliant w/ the low salt diet, and having an active life style, patient does obtain the bp at home and the average of 150/90, pt Has no Chest Pain, ++ legs swelling no lightheadedness, and not having a racing heart,  has not been feeling anxious, and not stressed out,  feeling better since the last visit        Constitutional: no chills and fever,  , nad     HENT: no ear pain or nosebleeds. No blurred vision  Respiratory: no shortness of breath, wheezing cough   Cardiovascular: Has no chest pain, ,and racing heart .   Gastrointestinal: No constipation, diarrhea, nausea and vomiting.   Genitourinary: No frequency.   Musculoskeletal: Negative for joint pain.   Skin: no itching, no rash.   Neurological: Negative for dizziness, no tremors  Psychiatric/Behavioral: +++ for depression  ++nervous/anxious.       General:  alert cooperative appears stated for the age  HEENT; normocephalic and atraumatic PERRLA extraocular motor intact normal tympanic membrane normal external ear bilaterally, mucosal normal no drainage  Neck: supple no adenopathy no JVD no bruit  Lungs: Clear to auscultation bilaterally no rales rhonchi or wheezes  Heart: Normal

## 2025-07-21 NOTE — ASSESSMENT & PLAN NOTE
Chronic, at goal (stable), continue current treatment plan and medication adherence emphasized    Orders:    meloxicam (MOBIC) 7.5 MG tablet; Take 1 tablet by mouth 2 times daily as needed for Pain    lidocaine (HM LIDOCAINE PATCH) 4 % external patch; Place 1 patch onto the skin daily    fluticasone (FLONASE) 50 MCG/ACT nasal spray; 1 spray by Nasal route daily    albuterol sulfate HFA (PROVENTIL;VENTOLIN;PROAIR) 108 (90 Base) MCG/ACT inhaler; Inhale 2 puffs into the lungs every 6 hours as needed for Wheezing    QUEtiapine (SEROQUEL) 100 MG tablet; Take 1 tablet by mouth nightly as needed (bipol)    cetirizine (ZYRTEC) 10 MG tablet; Take 1 tablet by mouth daily as needed for Allergies    loratadine (CLARITIN) 10 MG tablet; Take 1 tablet by mouth daily as needed (ar)    cloNIDine (CATAPRES) 0.1 MG tablet; Take 1 tablet by mouth 2 times daily    furosemide (LASIX) 20 MG tablet; Take 1 tablet by mouth daily for 14 days    [facility]

## 2025-07-21 NOTE — PROGRESS NOTES
Chief Complaint   Patient presents with    Hypertension     Follow up     Swelling       \"Have you been to the ER, urgent care clinic since your last visit?  Hospitalized since your last visit?\"    NO    “Have you seen or consulted any other health care providers outside of [facility] since your last visit?”    NO     “Have you had a pap smear?”    YES    Date of last Cervical Cancer screen (HPV or PAP): 9/10/2014       Click Here for Release of Records Request     No results found for this visit on 25.   Vitals:    25 1041 25 1045   BP: (!) 163/88 128/89   BP Site: Left Upper Arm Right Upper Arm   Patient Position: Sitting Sitting   BP Cuff Size: Large Adult Large Adult   Pulse: 77    Resp: 19    Temp: 97.5 °F (36.4 °C)    TempSrc: Temporal    SpO2: 98%         The patient, Del Crisostomo, identity was verified by name and .

## 2025-08-19 ENCOUNTER — OFFICE VISIT (OUTPATIENT)
Age: 40
End: 2025-08-19

## 2025-08-19 VITALS
HEIGHT: 62 IN | BODY MASS INDEX: 35.15 KG/M2 | SYSTOLIC BLOOD PRESSURE: 151 MMHG | TEMPERATURE: 98 F | DIASTOLIC BLOOD PRESSURE: 102 MMHG | HEART RATE: 77 BPM | WEIGHT: 191 LBS | RESPIRATION RATE: 18 BRPM | OXYGEN SATURATION: 98 %

## 2025-08-19 DIAGNOSIS — F31.81 BIPOLAR 2 DISORDER, MAJOR DEPRESSIVE EPISODE (HCC): Primary | ICD-10-CM

## 2025-08-19 RX ORDER — LAMOTRIGINE 25 MG/1
TABLET ORAL
Qty: 42 TABLET | Refills: 0 | Status: SHIPPED | OUTPATIENT
Start: 2025-08-19

## 2025-08-19 RX ORDER — QUETIAPINE FUMARATE 50 MG/1
50 TABLET, FILM COATED ORAL NIGHTLY
Qty: 30 TABLET | Refills: 1 | Status: SHIPPED | OUTPATIENT
Start: 2025-08-19

## 2025-08-19 ASSESSMENT — PATIENT HEALTH QUESTIONNAIRE - PHQ9
SUM OF ALL RESPONSES TO PHQ QUESTIONS 1-9: 7
SUM OF ALL RESPONSES TO PHQ QUESTIONS 1-9: 7
1. LITTLE INTEREST OR PLEASURE IN DOING THINGS: SEVERAL DAYS
SUM OF ALL RESPONSES TO PHQ QUESTIONS 1-9: 7
6. FEELING BAD ABOUT YOURSELF - OR THAT YOU ARE A FAILURE OR HAVE LET YOURSELF OR YOUR FAMILY DOWN: SEVERAL DAYS
SUM OF ALL RESPONSES TO PHQ QUESTIONS 1-9: 7
10. IF YOU CHECKED OFF ANY PROBLEMS, HOW DIFFICULT HAVE THESE PROBLEMS MADE IT FOR YOU TO DO YOUR WORK, TAKE CARE OF THINGS AT HOME, OR GET ALONG WITH OTHER PEOPLE: SOMEWHAT DIFFICULT
5. POOR APPETITE OR OVEREATING: NOT AT ALL
4. FEELING TIRED OR HAVING LITTLE ENERGY: SEVERAL DAYS
3. TROUBLE FALLING OR STAYING ASLEEP: MORE THAN HALF THE DAYS
2. FEELING DOWN, DEPRESSED OR HOPELESS: MORE THAN HALF THE DAYS
8. MOVING OR SPEAKING SO SLOWLY THAT OTHER PEOPLE COULD HAVE NOTICED. OR THE OPPOSITE, BEING SO FIGETY OR RESTLESS THAT YOU HAVE BEEN MOVING AROUND A LOT MORE THAN USUAL: NOT AT ALL
9. THOUGHTS THAT YOU WOULD BE BETTER OFF DEAD, OR OF HURTING YOURSELF: NOT AT ALL
7. TROUBLE CONCENTRATING ON THINGS, SUCH AS READING THE NEWSPAPER OR WATCHING TELEVISION: NOT AT ALL